# Patient Record
Sex: FEMALE | Race: WHITE | NOT HISPANIC OR LATINO | Employment: UNEMPLOYED | URBAN - METROPOLITAN AREA
[De-identification: names, ages, dates, MRNs, and addresses within clinical notes are randomized per-mention and may not be internally consistent; named-entity substitution may affect disease eponyms.]

---

## 2017-09-05 ENCOUNTER — TELEPHONE (OUTPATIENT)
Dept: NEUROLOGY | Facility: CLINIC | Age: 27
End: 2017-09-05

## 2017-09-05 DIAGNOSIS — G56.00 CTS (CARPAL TUNNEL SYNDROME): Primary | ICD-10-CM

## 2017-09-05 NOTE — TELEPHONE ENCOUNTER
----- Message from Mac Madrid sent at 9/5/2017 10:19 AM CDT -----  Contact: Mr. Sloan (dad) @ [phone]  Mr. Sloan is trying to schedule an EMG for pt, but the order is not in the system. Mr. Sloan states he faxed the orders to [phone] or [phone] or [phone]. Pls call.          I called this patient to schedule a sooner EMG and the father commenced to yelling at me stating that you people at your office almost killed my daughter and I explained to him more than once that we had never seen his daughter and I was only attempting to schedule his daughters EMG and had no knowledge of what he was speaking about he accepted the appointment offered for 9/27 at 10 am

## 2017-09-27 ENCOUNTER — PROCEDURE VISIT (OUTPATIENT)
Dept: NEUROLOGY | Facility: CLINIC | Age: 27
End: 2017-09-27
Payer: MEDICAID

## 2017-09-27 DIAGNOSIS — G56.00 CTS (CARPAL TUNNEL SYNDROME): ICD-10-CM

## 2017-09-27 PROCEDURE — 95886 MUSC TEST DONE W/N TEST COMP: CPT | Mod: 26,S$PBB,, | Performed by: NEUROMUSCULOSKELETAL MEDICINE & OMM

## 2017-09-27 PROCEDURE — 95910 NRV CNDJ TEST 7-8 STUDIES: CPT | Mod: 26,S$PBB,, | Performed by: NEUROMUSCULOSKELETAL MEDICINE & OMM

## 2017-09-27 PROCEDURE — 95886 MUSC TEST DONE W/N TEST COMP: CPT | Mod: PBBFAC,PO | Performed by: NEUROMUSCULOSKELETAL MEDICINE & OMM

## 2017-09-27 PROCEDURE — 95910 NRV CNDJ TEST 7-8 STUDIES: CPT | Mod: PBBFAC,PO | Performed by: NEUROMUSCULOSKELETAL MEDICINE & OMM

## 2017-12-20 ENCOUNTER — TELEPHONE (OUTPATIENT)
Dept: NEUROSURGERY | Facility: CLINIC | Age: 27
End: 2017-12-20

## 2017-12-20 DIAGNOSIS — G40.219 PARTIAL SYMPTOMATIC EPILEPSY WITH COMPLEX PARTIAL SEIZURES, INTRACTABLE, WITHOUT STATUS EPILEPTICUS: Primary | ICD-10-CM

## 2017-12-28 ENCOUNTER — ANESTHESIA EVENT (OUTPATIENT)
Dept: SURGERY | Facility: HOSPITAL | Age: 27
End: 2017-12-28
Payer: MEDICAID

## 2017-12-28 RX ORDER — ZONISAMIDE 100 MG/1
100 CAPSULE ORAL EVERY 12 HOURS
Status: ON HOLD | COMMUNITY
End: 2023-03-31 | Stop reason: ALTCHOICE

## 2017-12-28 RX ORDER — NAPROXEN 250 MG/1
250 TABLET ORAL
Status: ON HOLD | COMMUNITY
End: 2023-04-21 | Stop reason: HOSPADM

## 2017-12-28 RX ORDER — FELBAMATE 600 MG/1
600 TABLET ORAL EVERY 12 HOURS
Status: ON HOLD | COMMUNITY
End: 2023-03-31 | Stop reason: ALTCHOICE

## 2017-12-28 RX ORDER — GLUCOSAMINE/CHONDRO SU A 500-400 MG
1 TABLET ORAL
COMMUNITY
End: 2017-12-28

## 2017-12-29 ENCOUNTER — ANESTHESIA (OUTPATIENT)
Dept: SURGERY | Facility: HOSPITAL | Age: 27
End: 2017-12-29
Payer: MEDICAID

## 2017-12-29 ENCOUNTER — HOSPITAL ENCOUNTER (OUTPATIENT)
Facility: HOSPITAL | Age: 27
LOS: 1 days | Discharge: HOME | End: 2017-12-29
Attending: NEUROLOGICAL SURGERY | Admitting: NEUROLOGICAL SURGERY
Payer: MEDICAID

## 2017-12-29 VITALS
TEMPERATURE: 98 F | RESPIRATION RATE: 14 BRPM | HEIGHT: 65 IN | BODY MASS INDEX: 20.99 KG/M2 | HEART RATE: 66 BPM | DIASTOLIC BLOOD PRESSURE: 73 MMHG | OXYGEN SATURATION: 98 % | WEIGHT: 126 LBS | SYSTOLIC BLOOD PRESSURE: 118 MMHG

## 2017-12-29 DIAGNOSIS — G40.209 EPILEPSY WITH PARTIAL COMPLEX SEIZURES, WITHOUT STATUS EPILEPTICUS: ICD-10-CM

## 2017-12-29 DIAGNOSIS — G40.219 PARTIAL SYMPTOMATIC EPILEPSY WITH COMPLEX PARTIAL SEIZURES, INTRACTABLE, WITHOUT STATUS EPILEPTICUS: Primary | ICD-10-CM

## 2017-12-29 LAB
ABO + RH BLD: NORMAL
ANION GAP SERPL CALC-SCNC: 9 MMOL/L
APTT BLDCRRT: 25.4 SEC
BASOPHILS # BLD AUTO: 0.06 K/UL
BASOPHILS NFR BLD: 1.2 %
BILIRUB UR QL STRIP: NEGATIVE
BLD GP AB SCN CELLS X3 SERPL QL: NORMAL
BUN SERPL-MCNC: 17 MG/DL
CALCIUM SERPL-MCNC: 10 MG/DL
CHLORIDE SERPL-SCNC: 106 MMOL/L
CLARITY UR REFRACT.AUTO: CLEAR
CO2 SERPL-SCNC: 24 MMOL/L
COLOR UR AUTO: YELLOW
CREAT SERPL-MCNC: 0.7 MG/DL
DIFFERENTIAL METHOD: ABNORMAL
EOSINOPHIL # BLD AUTO: 0.2 K/UL
EOSINOPHIL NFR BLD: 3.1 %
ERYTHROCYTE [DISTWIDTH] IN BLOOD BY AUTOMATED COUNT: 16.5 %
EST. GFR  (AFRICAN AMERICAN): >60 ML/MIN/1.73 M^2
EST. GFR  (NON AFRICAN AMERICAN): >60 ML/MIN/1.73 M^2
GLUCOSE SERPL-MCNC: 71 MG/DL
GLUCOSE UR QL STRIP: NEGATIVE
HCT VFR BLD AUTO: 34.7 %
HGB BLD-MCNC: 11.2 G/DL
HGB UR QL STRIP: NEGATIVE
IMM GRANULOCYTES # BLD AUTO: 0.02 K/UL
IMM GRANULOCYTES NFR BLD AUTO: 0.4 %
INR PPP: 1
KETONES UR QL STRIP: NEGATIVE
LEUKOCYTE ESTERASE UR QL STRIP: NEGATIVE
LYMPHOCYTES # BLD AUTO: 2 K/UL
LYMPHOCYTES NFR BLD: 39.5 %
MCH RBC QN AUTO: 26.9 PG
MCHC RBC AUTO-ENTMCNC: 32.3 G/DL
MCV RBC AUTO: 83 FL
MONOCYTES # BLD AUTO: 0.4 K/UL
MONOCYTES NFR BLD: 6.8 %
NEUTROPHILS # BLD AUTO: 2.5 K/UL
NEUTROPHILS NFR BLD: 49 %
NITRITE UR QL STRIP: NEGATIVE
NRBC BLD-RTO: 0 /100 WBC
PH UR STRIP: 6 [PH] (ref 5–8)
PLATELET # BLD AUTO: 355 K/UL
PMV BLD AUTO: 10.3 FL
POTASSIUM SERPL-SCNC: 3.7 MMOL/L
PROT UR QL STRIP: NEGATIVE
PROTHROMBIN TIME: 10.3 SEC
RBC # BLD AUTO: 4.16 M/UL
SODIUM SERPL-SCNC: 139 MMOL/L
SP GR UR STRIP: 1.01 (ref 1–1.03)
URN SPEC COLLECT METH UR: NORMAL
UROBILINOGEN UR STRIP-ACNC: NEGATIVE EU/DL
WBC # BLD AUTO: 5.11 K/UL

## 2017-12-29 PROCEDURE — 80048 BASIC METABOLIC PNL TOTAL CA: CPT

## 2017-12-29 PROCEDURE — 25000003 PHARM REV CODE 250: Performed by: ANESTHESIOLOGY

## 2017-12-29 PROCEDURE — 85610 PROTHROMBIN TIME: CPT

## 2017-12-29 PROCEDURE — 63600175 PHARM REV CODE 636 W HCPCS: Performed by: STUDENT IN AN ORGANIZED HEALTH CARE EDUCATION/TRAINING PROGRAM

## 2017-12-29 PROCEDURE — 37000009 HC ANESTHESIA EA ADD 15 MINS: Performed by: NEUROLOGICAL SURGERY

## 2017-12-29 PROCEDURE — 99499 UNLISTED E&M SERVICE: CPT | Mod: ,,, | Performed by: NEUROLOGICAL SURGERY

## 2017-12-29 PROCEDURE — 85730 THROMBOPLASTIN TIME PARTIAL: CPT

## 2017-12-29 PROCEDURE — 36000709 HC OR TIME LEV III EA ADD 15 MIN: Performed by: NEUROLOGICAL SURGERY

## 2017-12-29 PROCEDURE — 94761 N-INVAS EAR/PLS OXIMETRY MLT: CPT | Mod: 59

## 2017-12-29 PROCEDURE — 63600175 PHARM REV CODE 636 W HCPCS

## 2017-12-29 PROCEDURE — D9220A PRA ANESTHESIA: Mod: ,,, | Performed by: ANESTHESIOLOGY

## 2017-12-29 PROCEDURE — 37000008 HC ANESTHESIA 1ST 15 MINUTES: Performed by: NEUROLOGICAL SURGERY

## 2017-12-29 PROCEDURE — 63600175 PHARM REV CODE 636 W HCPCS: Performed by: ANESTHESIOLOGY

## 2017-12-29 PROCEDURE — 85025 COMPLETE CBC W/AUTO DIFF WBC: CPT

## 2017-12-29 PROCEDURE — 71000015 HC POSTOP RECOV 1ST HR: Performed by: NEUROLOGICAL SURGERY

## 2017-12-29 PROCEDURE — 81003 URINALYSIS AUTO W/O SCOPE: CPT

## 2017-12-29 PROCEDURE — 25000003 PHARM REV CODE 250: Performed by: NEUROLOGICAL SURGERY

## 2017-12-29 PROCEDURE — 71000033 HC RECOVERY, INTIAL HOUR: Performed by: NEUROLOGICAL SURGERY

## 2017-12-29 PROCEDURE — 86901 BLOOD TYPING SEROLOGIC RH(D): CPT

## 2017-12-29 PROCEDURE — C1776 JOINT DEVICE (IMPLANTABLE): HCPCS | Performed by: NEUROLOGICAL SURGERY

## 2017-12-29 PROCEDURE — 71000039 HC RECOVERY, EACH ADD'L HOUR: Performed by: NEUROLOGICAL SURGERY

## 2017-12-29 PROCEDURE — C1767 GENERATOR, NEURO NON-RECHARG: HCPCS | Performed by: NEUROLOGICAL SURGERY

## 2017-12-29 PROCEDURE — 36000708 HC OR TIME LEV III 1ST 15 MIN: Performed by: NEUROLOGICAL SURGERY

## 2017-12-29 PROCEDURE — 27201423 OPTIME MED/SURG SUP & DEVICES STERILE SUPPLY: Performed by: NEUROLOGICAL SURGERY

## 2017-12-29 PROCEDURE — 71000016 HC POSTOP RECOV ADDL HR: Performed by: NEUROLOGICAL SURGERY

## 2017-12-29 PROCEDURE — 25000003 PHARM REV CODE 250: Performed by: STUDENT IN AN ORGANIZED HEALTH CARE EDUCATION/TRAINING PROGRAM

## 2017-12-29 PROCEDURE — 63600175 PHARM REV CODE 636 W HCPCS: Performed by: NEUROLOGICAL SURGERY

## 2017-12-29 PROCEDURE — 27000221 HC OXYGEN, UP TO 24 HOURS

## 2017-12-29 PROCEDURE — 94760 N-INVAS EAR/PLS OXIMETRY 1: CPT

## 2017-12-29 DEVICE — IMPLANTABLE DEVICE: Type: IMPLANTABLE DEVICE | Site: NECK | Status: FUNCTIONAL

## 2017-12-29 RX ORDER — ACETAMINOPHEN 10 MG/ML
INJECTION, SOLUTION INTRAVENOUS
Status: DISCONTINUED | OUTPATIENT
Start: 2017-12-29 | End: 2017-12-29

## 2017-12-29 RX ORDER — MUPIROCIN 20 MG/G
OINTMENT TOPICAL
Status: DISCONTINUED | OUTPATIENT
Start: 2017-12-29 | End: 2017-12-29 | Stop reason: HOSPADM

## 2017-12-29 RX ORDER — HYDROCODONE BITARTRATE AND ACETAMINOPHEN 10; 325 MG/1; MG/1
1 TABLET ORAL EVERY 6 HOURS PRN
Qty: 15 TABLET | Refills: 0 | Status: ON HOLD | OUTPATIENT
Start: 2017-12-29 | End: 2023-03-31 | Stop reason: SDUPTHER

## 2017-12-29 RX ORDER — OXYCODONE AND ACETAMINOPHEN 10; 325 MG/1; MG/1
1 TABLET ORAL EVERY 4 HOURS PRN
Status: DISCONTINUED | OUTPATIENT
Start: 2017-12-29 | End: 2017-12-29 | Stop reason: HOSPADM

## 2017-12-29 RX ORDER — MUPIROCIN 20 MG/G
1 OINTMENT TOPICAL
Status: COMPLETED | OUTPATIENT
Start: 2017-12-29 | End: 2017-12-29

## 2017-12-29 RX ORDER — ONDANSETRON 8 MG/1
8 TABLET, ORALLY DISINTEGRATING ORAL EVERY 6 HOURS PRN
Status: DISCONTINUED | OUTPATIENT
Start: 2017-12-29 | End: 2017-12-29 | Stop reason: HOSPADM

## 2017-12-29 RX ORDER — VANCOMYCIN HYDROCHLORIDE 1 G/20ML
INJECTION, POWDER, LYOPHILIZED, FOR SOLUTION INTRAVENOUS
Status: DISCONTINUED | OUTPATIENT
Start: 2017-12-29 | End: 2017-12-29 | Stop reason: HOSPADM

## 2017-12-29 RX ORDER — LIDOCAINE HCL/PF 100 MG/5ML
SYRINGE (ML) INTRAVENOUS
Status: DISCONTINUED | OUTPATIENT
Start: 2017-12-29 | End: 2017-12-29

## 2017-12-29 RX ORDER — BACITRACIN 50000 [IU]/1
INJECTION, POWDER, FOR SOLUTION INTRAMUSCULAR
Status: DISCONTINUED | OUTPATIENT
Start: 2017-12-29 | End: 2017-12-29 | Stop reason: HOSPADM

## 2017-12-29 RX ORDER — HYDROMORPHONE HYDROCHLORIDE 2 MG/ML
0.2 INJECTION, SOLUTION INTRAMUSCULAR; INTRAVENOUS; SUBCUTANEOUS EVERY 5 MIN PRN
Status: DISCONTINUED | OUTPATIENT
Start: 2017-12-29 | End: 2017-12-29 | Stop reason: HOSPADM

## 2017-12-29 RX ORDER — SODIUM CHLORIDE 9 MG/ML
INJECTION, SOLUTION INTRAVENOUS CONTINUOUS
Status: DISCONTINUED | OUTPATIENT
Start: 2017-12-29 | End: 2017-12-29 | Stop reason: HOSPADM

## 2017-12-29 RX ORDER — ROCURONIUM BROMIDE 10 MG/ML
INJECTION, SOLUTION INTRAVENOUS
Status: DISCONTINUED | OUTPATIENT
Start: 2017-12-29 | End: 2017-12-29

## 2017-12-29 RX ORDER — HYDROMORPHONE HYDROCHLORIDE 2 MG/ML
INJECTION, SOLUTION INTRAMUSCULAR; INTRAVENOUS; SUBCUTANEOUS
Status: COMPLETED
Start: 2017-12-29 | End: 2017-12-29

## 2017-12-29 RX ORDER — MIDAZOLAM HYDROCHLORIDE 1 MG/ML
INJECTION, SOLUTION INTRAMUSCULAR; INTRAVENOUS
Status: DISCONTINUED | OUTPATIENT
Start: 2017-12-29 | End: 2017-12-29

## 2017-12-29 RX ORDER — BUPIVACAINE HYDROCHLORIDE AND EPINEPHRINE 5; 5 MG/ML; UG/ML
INJECTION, SOLUTION EPIDURAL; INTRACAUDAL; PERINEURAL
Status: DISCONTINUED | OUTPATIENT
Start: 2017-12-29 | End: 2017-12-29 | Stop reason: HOSPADM

## 2017-12-29 RX ORDER — SUCCINYLCHOLINE CHLORIDE 20 MG/ML
INJECTION INTRAMUSCULAR; INTRAVENOUS
Status: DISCONTINUED | OUTPATIENT
Start: 2017-12-29 | End: 2017-12-29

## 2017-12-29 RX ORDER — SODIUM CHLORIDE 0.9 % (FLUSH) 0.9 %
3 SYRINGE (ML) INJECTION
Status: DISCONTINUED | OUTPATIENT
Start: 2017-12-29 | End: 2017-12-29 | Stop reason: HOSPADM

## 2017-12-29 RX ORDER — ONDANSETRON 2 MG/ML
INJECTION INTRAMUSCULAR; INTRAVENOUS
Status: DISCONTINUED | OUTPATIENT
Start: 2017-12-29 | End: 2017-12-29

## 2017-12-29 RX ORDER — ONDANSETRON 2 MG/ML
4 INJECTION INTRAMUSCULAR; INTRAVENOUS ONCE AS NEEDED
Status: DISCONTINUED | OUTPATIENT
Start: 2017-12-29 | End: 2017-12-29 | Stop reason: HOSPADM

## 2017-12-29 RX ORDER — ACETAMINOPHEN 325 MG/1
325 TABLET ORAL EVERY 6 HOURS PRN
Status: DISCONTINUED | OUTPATIENT
Start: 2017-12-29 | End: 2017-12-29 | Stop reason: HOSPADM

## 2017-12-29 RX ORDER — CEPHALEXIN 500 MG/1
500 CAPSULE ORAL EVERY 6 HOURS
Qty: 12 CAPSULE | Refills: 0 | Status: SHIPPED | OUTPATIENT
Start: 2017-12-29 | End: 2018-01-01

## 2017-12-29 RX ORDER — PROPOFOL 10 MG/ML
VIAL (ML) INTRAVENOUS
Status: DISCONTINUED | OUTPATIENT
Start: 2017-12-29 | End: 2017-12-29

## 2017-12-29 RX ORDER — PHENYLEPHRINE HYDROCHLORIDE 10 MG/ML
INJECTION INTRAVENOUS
Status: DISCONTINUED | OUTPATIENT
Start: 2017-12-29 | End: 2017-12-29

## 2017-12-29 RX ORDER — MUPIROCIN 20 MG/G
1 OINTMENT TOPICAL 2 TIMES DAILY
Status: DISCONTINUED | OUTPATIENT
Start: 2017-12-29 | End: 2017-12-29 | Stop reason: HOSPADM

## 2017-12-29 RX ORDER — FENTANYL CITRATE 50 UG/ML
INJECTION, SOLUTION INTRAMUSCULAR; INTRAVENOUS
Status: DISCONTINUED | OUTPATIENT
Start: 2017-12-29 | End: 2017-12-29

## 2017-12-29 RX ADMIN — ROCURONIUM BROMIDE 10 MG: 10 INJECTION, SOLUTION INTRAVENOUS at 10:12

## 2017-12-29 RX ADMIN — FENTANYL CITRATE 100 MCG: 50 INJECTION, SOLUTION INTRAMUSCULAR; INTRAVENOUS at 09:12

## 2017-12-29 RX ADMIN — EPHEDRINE SULFATE 10 MG: 50 INJECTION, SOLUTION INTRAMUSCULAR; INTRAVENOUS; SUBCUTANEOUS at 10:12

## 2017-12-29 RX ADMIN — HYDROMORPHONE HYDROCHLORIDE 0.2 MG: 2 INJECTION INTRAMUSCULAR; INTRAVENOUS; SUBCUTANEOUS at 11:12

## 2017-12-29 RX ADMIN — ROCURONIUM BROMIDE 5 MG: 10 INJECTION, SOLUTION INTRAVENOUS at 09:12

## 2017-12-29 RX ADMIN — PROPOFOL 150 MG: 10 INJECTION, EMULSION INTRAVENOUS at 09:12

## 2017-12-29 RX ADMIN — MUPIROCIN 1 G: 20 OINTMENT TOPICAL at 08:12

## 2017-12-29 RX ADMIN — LIDOCAINE HYDROCHLORIDE 80 MG: 20 INJECTION, SOLUTION INTRAVENOUS at 09:12

## 2017-12-29 RX ADMIN — ACETAMINOPHEN 1000 MG: 10 INJECTION, SOLUTION INTRAVENOUS at 10:12

## 2017-12-29 RX ADMIN — PROPOFOL 50 MG: 10 INJECTION, EMULSION INTRAVENOUS at 10:12

## 2017-12-29 RX ADMIN — SODIUM CHLORIDE: 0.9 INJECTION, SOLUTION INTRAVENOUS at 09:12

## 2017-12-29 RX ADMIN — ROCURONIUM BROMIDE 25 MG: 10 INJECTION, SOLUTION INTRAVENOUS at 10:12

## 2017-12-29 RX ADMIN — OXYCODONE HYDROCHLORIDE AND ACETAMINOPHEN 1 TABLET: 10; 325 TABLET ORAL at 12:12

## 2017-12-29 RX ADMIN — CEFTRIAXONE 2 G: 2 INJECTION, SOLUTION INTRAVENOUS at 09:12

## 2017-12-29 RX ADMIN — ONDANSETRON 4 MG: 2 INJECTION INTRAMUSCULAR; INTRAVENOUS at 11:12

## 2017-12-29 RX ADMIN — SODIUM CHLORIDE, SODIUM GLUCONATE, SODIUM ACETATE, POTASSIUM CHLORIDE, MAGNESIUM CHLORIDE, SODIUM PHOSPHATE, DIBASIC, AND POTASSIUM PHOSPHATE: .53; .5; .37; .037; .03; .012; .00082 INJECTION, SOLUTION INTRAVENOUS at 10:12

## 2017-12-29 RX ADMIN — PHENYLEPHRINE HYDROCHLORIDE 200 MCG: 10 INJECTION INTRAVENOUS at 09:12

## 2017-12-29 RX ADMIN — HYDROMORPHONE HYDROCHLORIDE 0.2 MG: 2 INJECTION INTRAMUSCULAR; INTRAVENOUS; SUBCUTANEOUS at 12:12

## 2017-12-29 RX ADMIN — MIDAZOLAM HYDROCHLORIDE 2 MG: 1 INJECTION, SOLUTION INTRAMUSCULAR; INTRAVENOUS at 09:12

## 2017-12-29 RX ADMIN — SUCCINYLCHOLINE CHLORIDE 140 MG: 20 INJECTION, SOLUTION INTRAMUSCULAR; INTRAVENOUS at 09:12

## 2017-12-29 NOTE — PLAN OF CARE
Discharge instructions reviewed with pt and family. Understanding verbalized. Paper prescriptions given. Pt reported pain to be tolerable. MD to bedside to address concerns and follow up care. Pt able to urinate in restroom and tolerate PO intake. Transported to car by PCT.

## 2017-12-29 NOTE — ANESTHESIA PREPROCEDURE EVALUATION
No past medical history on file.  No past surgical history on file.  Patient Active Problem List   Diagnosis    Epilepsy with partial complex seizures, without status epilepticus     Please See ROS/PMH and Active Problem List above                                                                                                           12/29/2017  Melani Sloan is a 27 y.o., female.    Anesthesia Evaluation    I have reviewed the Patient Summary Reports.    I have reviewed the Nursing Notes.   I have reviewed the Medications.     Review of Systems  Anesthesia Hx:  No problems with previous Anesthesia   Denies Personal Hx of Anesthesia complications.   Social:  Non-Smoker    Hematology/Oncology:  Hematology Normal   Oncology Normal     EENT/Dental:EENT/Dental Normal   Cardiovascular:   Exercise tolerance: good Works out daily, active   Pulmonary:  Pulmonary Normal    Renal/:  Renal/ Normal     Musculoskeletal:  Musculoskeletal Normal    Neurological:   Seizures S/p left VNS years ago    Prone to median nerve sx after seizures secondary to hand position.   Endocrine:  Endocrine Normal    Dermatological:  Skin Normal        Physical Exam  General:  Well nourished    Airway/Jaw/Neck:  Airway Findings: Mouth Opening: Normal Tongue: Normal  General Airway Assessment: Adult  Mallampati: I  TM Distance: 4 - 6 cm  Jaw/Neck Findings:  Neck ROM: Normal ROM     Eyes/Ears/Nose:  Eyes/Ears/Nose Findings:    Dental:  Dental Findings: In tact   Chest/Lungs:  Chest/Lungs Findings: Clear to auscultation, Normal Respiratory Rate     Heart/Vascular:  Heart Findings: Rate: Normal  Rhythm: Regular Rhythm        Mental Status:  Mental Status Findings:  Cooperative, Alert and Oriented         Anesthesia Plan  Type of Anesthesia, risks & benefits discussed:  Anesthesia Type:  general  Patient's Preference: gen  Intra-op Monitoring Plan:   Intra-op Monitoring Plan Comments:   Post Op Pain Control Plan:   Post Op Pain Control Plan  Comments: Iv>po  Induction:   IV  Beta Blocker:  Patient is not currently on a Beta-Blocker (No further documentation required).       Informed Consent: Patient understands risks and agrees with Anesthesia plan.  Questions answered. Anesthesia consent signed with patient.  ASA Score: 2     Day of Surgery Review of History & Physical:    H&P update referred to the surgeon.     Anesthesia Plan Notes: Labs ordered by service        Ready For Surgery From Anesthesia Perspective.

## 2017-12-29 NOTE — DISCHARGE INSTRUCTIONS
Activity Restrictions:  [x]  Return to work will be determined on an individual basis.  [x]  No lifting greater than 10 pounds.  [x]  No driving or operating machinery:  [x]  until cleared by your surgeon.  [x]  while taking narcotic pain medications or muscle relaxants.      Discharge Medication/Follow-up:  [x]  Please refer to discharge medication reconciliation form.  [x]  Take docusate (Colace 100 mg): take one capsule a day as needed for constipation. You can get this over the counter.  [x]  Follow-up appointment:  [x]  10-14 days post-op for wound check by physician assistant/nurse  []  An appointment will be mailed to you, or you will be called by [facility] outpatient neurosurgery clinic.    Wound Care:  [x]  No bandage required. Keep your incision open to the air. Please avoid ointments on wound.  [x]  You may shower on the 2nd day after your surgery. Have the force of water hit you opposite from the incision. Pat the incision dry after your shower; do not scrub the incision.  [x]  You cannot take a bath until 8 weeks after surgery.    Call your doctor or go to the Emergency Room for any signs of infection, including: increased redness, drainage, pain, or fever (temperature ?101.5 for 24 hours). Call your doctor or go to the Emergency Room if there are any localized neurological changes; problems with speech, vision, numbness, tingling, weakness, or severe headache; or for other concerns.    Special Instructions:  [x]  No use of tobacco products.  [x]  Diet: Please eat a regular diet as tolerated.    Physicians need 3 days' notice for pain medicine to be refilled. Pain medicine will only be refilled between 8 AM and 5 PM, Monday through Friday, due to Food and Drug Administration regulation of documentation.    If you have any questions about this form, please call [phone].    Form No. 56015 (Revised 10/31/2013)            Recovery After Procedural Sedation (Adult)  You have been given medicine  by vein to make you sleep during your surgery. This may have included both a pain medicine and sleeping medicine. Most of the effects have worn off. But you may still have some drowsiness for the next 6 to 8 hours.  Home care  Follow these guidelines when you get home:  · For the next 8 hours, you should be watched by a responsible adult. This person should make sure your condition is not getting worse.  · Don't drink any alcohol for the next 24 hours.  · Don't drive, operate dangerous machinery, or make important business or personal decisions during the next 24 hours.  Note: Your healthcare provider may tell you not to take any medicine by mouth for pain or sleep in the next 4 hours. These medicines may react with the medicines you were given in the hospital. This could cause a much stronger response than usual.  Follow-up care  Follow up with your healthcare provider if you are not alert and back to your usual level of activity within 12 hours.  When to seek medical advice  Call your healthcare provider right away if any of these occur:  · Drowsiness gets worse  · Weakness or dizziness gets worse  · Repeated vomiting  · You can't be awakened   Date Last Reviewed: 10/18/2016  © 7824-2015 [employer]. [address] [place] [address], [place] [address]. All rights reserved. This information is not intended as a substitute for professional medical care. Always follow your healthcare professional's instructions.

## 2017-12-29 NOTE — H&P
History and Physical  Neurosurgery    Admit Date: 12/29/2017  LOS: 0    Code Status: No Order     CC: <principal problem not specified>    SUBJECTIVE:     History of Present Illness: 26 yo female with pmh of complex partial epilepsy s/p L VNS implanattion presents for elective total system replacement in favor of a newer model per family. Patient is neurologically intact on exam. Current symptomology controlled with triple antiepileptic regimen which has been effective per family.    Left cervical and thoracic incisions well healed.         OBJECTIVE:   Vital Signs (Most Recent):   Temp: 97.7 °F (36.5 °C) (12/29/17 0915)  Pulse: 71 (12/29/17 0915)  Resp: 18 (12/29/17 0915)  BP: 110/66 (12/29/17 0916)  SpO2: 100 % (12/29/17 0915)    Vital Signs (24h Range):   Temp:  [97.7 °F (36.5 °C)] 97.7 °F (36.5 °C)  Pulse:  [71] 71  Resp:  [18] 18  SpO2:  [100 %] 100 %  BP: (110)/(66) 110/66      I & O (Last 24h):  No intake or output data in the 24 hours ending 12/29/17 0918    Physical Exam:  General: well developed, well nourished, no distress.   Head: normocephalic, atraumatic  Cervical Spine: No midline tenderness to palpation.  Thoracolumbosacral Spine: No midline tenderness to palpation.  GCS: Motor: 6/Verbal: 5/Eyes: 4 GCS Total: 15  Mental Status: Awake, Alert, Oriented x 4  Language: No aphasia  Speech: Mild dysarthria  Facial Droop: None   Cranial nerves: CN III-XII grossly intact.  Visual Fields: Intact.   Eyes: Pupils equal and reactive to light. Intact Conjugate horizontal and vertical pursuit. No nystagmus. No gaze deviation.   Pulmonary: No distress.  Sensory: No deficit.  Propioception: Not tested  Rectal Tone: Not tested.  Drift: None.  Upper Extremity Ataxia: No Dysmetria Bilaterally  Lower Extremity Ataxia: Not tested.  Dysdiadochokinesia: Not tested.  Reflexes: 2+ patellar bilaterally.  Parrish: Absent  Clonus: Absent  Babinski: Absent  Romberg: Not Tested  Pulses: Brisk and symmetric radial, DP and tibial  pulses.  Motor Strength:    Strength  Shoulder Abduction Elbow Extension Elbow Flexion Wrist Extension Wrist Flexion Finger Opposition Finger Add Finger Abd   Upper: R 5/5 5/5 5/5 5/5 5/5 5/5 5/5 5/5    L 5/5 5/5 5/5 5/5 5/5 5/5 5/5 5/5     Hip Flexion Knee Extension Knee  Flexion Ankle Dflexion Ankle Pflexion EHL     Lower: R 5/5 5/5 5/5 5/5 5/5 5/5      L 5/5 5/5 5/5 5/5 5/5 5/5           Lines/Drains/Airway:          Nutrition/Tube Feeds:   Current Diet Order   Procedures    Diet NPO       Labs:  ABG: No results for input(s): PH, PO2, PCO2, HCO3, POCSATURATED, BE in the last 24 hours.  BMP:No results for input(s): NA, K, CL, CO2, BUN, CREATININE, GLU, MG, PHOS in the last 24 hours.  LFT: No results found for: AST, ALT, GGT, ALKPHOS, BILITOT, ALBUMIN, PROT  CBC: No results found for: WBC, HGB, HCT, MCV, PLT  Microbiology x 7d:   Microbiology Results (last 7 days)     ** No results found for the last 168 hours. **            ASSESSMENT/PLAN:   28 yo female with pmh of complex partial epilepsy presenting for elective total system replacement of previously implanted left VNS. Neurologically stable on exam.    ---To OR for elective VNS total revision      Tucker Avery

## 2017-12-29 NOTE — DISCHARGE SUMMARY
[facility]  Neurosurgery  Discharge Summary      Patient Name: Melani Sloan  MRN: [id number]  Admission Date: 12/29/2017  Hospital Length of Stay: 1 days  Discharge Date and Time:  12/29/2017 12:46 PM  Attending Physician: Darrick Leal MD   Discharging Provider: Tucker Avery MD  Primary Care Provider: Portia Cohen MD     HPI: 26 yo female with pmh of complex partial epilepsy s/p L VNS placement with axillary subcutaneous IPG at end of life (the VNS) presents for elective total component replacement. She tolerated the procedure well and will be discharged home today with a short course of oral antibiotics and analgesics with instructions to follow up in outpatient clinic in 14 days for wound check. She has been instructed to continue her home anti-epileptic regimen. Patient has remained neurologically intact on exam throughout stay.       Procedure(s) (LRB):  PLACEMENT-STIMULATOR-NERVE-VAGAL total revision- Left neck and chest (N/A)     Hospital Course: As above.     Consults:     Significant Diagnostic Studies: Labs:   BMP:   Recent Labs  Lab 12/29/17  0845   GLU 71      K 3.7      CO2 24   BUN 17   CREATININE 0.7   CALCIUM 10.0   , CMP   Recent Labs  Lab 12/29/17  0845      K 3.7      CO2 24   GLU 71   BUN 17   CREATININE 0.7   CALCIUM 10.0   ANIONGAP 9   ESTGFRAFRICA >60.0   EGFRNONAA >60.0   , CBC   Recent Labs  Lab 12/29/17  0845   WBC 5.11   HGB 11.2*   HCT 34.7*   *    and All labs within the past 24 hours have been reviewed    Pending Diagnostic Studies:     None        Final Active Diagnoses:    Diagnosis Date Noted POA    PRINCIPAL PROBLEM:  Epilepsy with partial complex seizures, without status epilepticus [G40.209] 12/29/2017 Yes      Problems Resolved During this Admission:    Diagnosis Date Noted Date Resolved POA      Discharged Condition: good    Disposition: Home or Self Care    Follow Up:  Follow-up Information     [facility] In 2 weeks.    Contact information:  You will be contacted to arrange follow up at your convenience within 14 days or so.           Please follow up.    Why:  For wound re-check               Patient Instructions:     Diet general     Call MD for:  temperature >100.4     Call MD for:  persistent nausea and vomiting     Call MD for:  extreme fatigue     Call MD for:  persistent dizziness or light-headedness     Call MD for:  hives     Call MD for:  difficulty breathing, headache or visual disturbances     Call MD for:  severe uncontrolled pain     Call MD for:  redness, tenderness, or signs of infection (pain, swelling, redness, odor or green/yellow discharge around incision site)     No dressing needed   Order Comments: Incisions closed with absorbable sutures and cyanoacrylate adhesive. No dressing needed. Please keep open to air, no need to apply bandages and please do not apply ointments. You may shower on the second day following surgery. Let water run over wound , but do not scrub directly or submerge. No baths or swimming above waist for 8 weeks.       Medications:  Reconciled Home Medications:   Current Discharge Medication List      START taking these medications    Details   cephALEXin (KEFLEX) 500 MG capsule Take 1 capsule (500 mg total) by mouth every 6 (six) hours.  Qty: 12 capsule, Refills: 0      hydrocodone-acetaminophen 10-325mg (NORCO)  mg Tab Take 1 tablet by mouth every 6 (six) hours as needed for Pain (severe pain (7 or more out of 10).).  Qty: 15 tablet, Refills: 0         CONTINUE these medications which have NOT CHANGED    Details   !! brivaracetam (BRIVIACT) 50 mg Tab Take 50 mg by mouth every morning.      !! brivaracetam (BRIVIACT) 50 mg Tab Take 50 mg by mouth nightly.      felbamate (FELBATOL) 600 MG Tab Take 600 mg by mouth every 12 (twelve) hours.       naproxen (NAPROSYN) 250 MG tablet Take 250 mg by mouth every 4 to 6 hours as needed.      zonisamide (ZONEGRAN) 100 MG Cap  Take 100 mg by mouth every 12 (twelve) hours.       !! - Potential duplicate medications found. Please discuss with provider.      STOP taking these medications       UNABLE TO FIND Comments:   Reason for Stopping:               Tucker Avery MD  Neurosurgery  [facility]

## 2017-12-29 NOTE — BRIEF OP NOTE
[facility]  Brief Operative Note    SUMMARY     Surgery Date: 12/29/2017     Surgeon(s) and Role:     * Darrick Leal MD - Primary     * Tucker Avery MD - Resident - Assisting        Pre-op Diagnosis:  Partial symptomatic epilepsy with complex partial seizures, intractable, without status epilepticus [G40.219]    Post-op Diagnosis:  Post-Op Diagnosis Codes:     * Partial symptomatic epilepsy with complex partial seizures, intractable, without status epilepticus [G40.219]    Procedure(s) (LRB):  PLACEMENT-STIMULATOR-NERVE-VAGAL total revision- Left neck and chest (N/A)    Anesthesia: General    Description of Procedure: Total component removal and replacement of left vagal nerve stimulator with IPG in left subcutaneous axillary pocket.       Estimated Blood Loss: * No values recorded between 12/29/2017 10:11 AM and 12/29/2017 11:25 AM *         Specimens:   Specimen (12h ago through future)    None

## 2017-12-29 NOTE — OP NOTE
DATE OF PROCEDURE:  12/29/2017.    PREOPERATIVE DIAGNOSIS:  Medically refractory epilepsy with end-of-life   generator with old type 2 pin electrode.    POSTOPERATIVE DIAGNOSIS:  Medically refractory epilepsy with end-of-life   generator with old type 2 pin electrode.    PROCEDURE PERFORMED:  Total revision of vagus nerve stimulator with removal of   existing electrode and pulse generator and replacement with new electrode and   pulse generator.    SURGEON:  Davey Montejo M.D.    RESIDENT:  Tucker Avery M.D. (RES).    ANESTHESIA:  General, endotracheal tube.    ESTIMATED BLOOD LOSS:  Minimal.    COMPLICATIONS:  None.    SPECIMENS:  None.    BRIEF HISTORY:  Melani Sloan is a 27-year-old female with a history of   medically refractory epilepsy.  The vagus nerve stimulator has been doing a good   job of controlling her seizures; however, the battery is ending end of life.  I   spoke at length with the patient and her family regarding the risks, benefits,   and alternatives of proceeding with vagus nerve stimulator system revision.    Informed consent was obtained.    PROCEDURE IN DETAIL:  The present was taken to the Operating Room where she was   placed in the supine position on the operating table after an uneventful   induction of general anesthesia.  She was given 2 g of Ancef 20 minutes prior to   skin incision with an order to stop perioperative antibiotics within 24 hours.    The patient had SCD stockings in place for DVT prophylaxis.  She was prepped   and draped in the usual sterile fashion.  Her existing transverse cervical   incision was opened.  We undermined the platysma.  We identified the existing   electrode and dissected superiorly to expose normal carotid sheath.  We then   identified the vagus nerve and dissected inferiorly towards the existing   electrode.  We removed the top contact of the existing electrode and then   clipped the system, leaving two electrodes attached to the nerve.  We then    placed the 3 contacts to the new electrode around the nerve.  We then opened the   anterior axillary line incision.  We removed the existing generator and the   electrode.  Both wounds were copiously irrigated with normal saline.  We then   connected the new electrode after tunneling to the new generator.  This was   placed into the pocket and diagnostics were performed.  Everything was   satisfactory.    Both wounds were copiously irrigated with normal saline.  Vancomycin powder was   placed at both incisions.  The incisions were then closed with inverted   interrupted 3-0 Vicryl suture followed by running 4-0 Monocryl.    No apparent intraoperative complications occurred during this procedure.  I was   present for this entire procedure.  The patient was transferred to the Recovery   area in stable condition.      ASD/HN  dd: 12/29/2017 11:17:21 (CST)  td: 12/29/2017 11:44:41 (CST)  Doc ID   #[id number]  Job ID #[id number]    CC:

## 2017-12-29 NOTE — TRANSFER OF CARE
"Anesthesia Transfer of Care Note    Patient: Melani Sloan    Procedure(s) Performed: Procedure(s) (LRB):  PLACEMENT-STIMULATOR-NERVE-VAGAL total revision- Left neck and chest (N/A)    Patient location: PACU    Anesthesia Type: general    Transport from OR: Transported from OR on 2-3 L/min O2 by NC with adequate spontaneous ventilation    Post pain: adequate analgesia    Post assessment: no apparent anesthetic complications    Post vital signs: stable    Level of consciousness: awake, alert and oriented    Nausea/Vomiting: no nausea/vomiting    Complications: none    Transfer of care protocol was followed      Last vitals:   Visit Vitals  BP (!) 111/59   Pulse 75   Temp 36.5 °C (97.7 °F) (Oral)   Resp 16   Ht 5' 5" (1.651 m)   Wt 57.2 kg (126 lb)   LMP 12/01/2017 (Exact Date)   SpO2 100%   Breastfeeding? No   BMI 20.97 kg/m²     "

## 2017-12-29 NOTE — ANESTHESIA POSTPROCEDURE EVALUATION
"Anesthesia Post Evaluation    Patient: Melani Sloan    Procedure(s) Performed: Procedure(s) (LRB):  PLACEMENT-STIMULATOR-NERVE-VAGAL total revision- Left neck and chest (N/A)    Final Anesthesia Type: general  Patient location during evaluation: PACU  Patient participation: Yes- Able to Participate  Level of consciousness: awake and alert  Post-procedure vital signs: reviewed and stable  Pain management: adequate  Airway patency: patent  PONV status at discharge: No PONV  Anesthetic complications: no      Cardiovascular status: blood pressure returned to baseline  Respiratory status: unassisted  Hydration status: euvolemic  Follow-up not needed.        Visit Vitals  BP (!) 105/57   Pulse 71   Temp 36.7 °C (98 °F) (Oral)   Resp 16   Ht 5' 5" (1.651 m)   Wt 57.2 kg (126 lb)   LMP 12/01/2017 (Exact Date)   SpO2 100%   Breastfeeding? No   BMI 20.97 kg/m²       Pain/Dandre Score: Pain Assessment Performed: Yes (12/29/2017 11:45 AM)  Presence of Pain: complains of pain/discomfort (12/29/2017 11:45 AM)  Pain Rating Prior to Med Admin: 7 (12/29/2017 12:20 PM)  Dandre Score: 9 (12/29/2017 11:45 AM)      "

## 2018-01-02 ENCOUNTER — NURSE TRIAGE (OUTPATIENT)
Dept: ADMINISTRATIVE | Facility: CLINIC | Age: 28
End: 2018-01-02

## 2018-01-03 ENCOUNTER — TELEPHONE (OUTPATIENT)
Dept: NEUROSURGERY | Facility: CLINIC | Age: 28
End: 2018-01-03

## 2018-01-03 NOTE — TELEPHONE ENCOUNTER
Reason for Disposition   Caller has NON-URGENT question and triager unable to answer question    Answer Assessment - Initial Assessment Questions  Pt had revision of vagal nerve stimulator 12/29/17. C/o of pain when she moves her head. Level of pain depends on how she is moving/yawning. Pain can be rated a 7 or 8 when turning head to the right -feels pain on the left side of neck. Incision intact, no redness/drainage, no fever or other sx's reported.    Protocols used: ST POST-OP INCISION SYMPTOMS-A-AH

## 2018-01-03 NOTE — TELEPHONE ENCOUNTER
This is a Dr Montejo patient. They have already made a 1200 appt with Dr Montejo at [facility]

## 2018-01-03 NOTE — TELEPHONE ENCOUNTER
----- Message from Mary Lemus sent at 1/3/2018  8:42 AM CST -----  Contact: Baldemar  X 1st Request  _ 2nd Request  _ 3rd Request    Who: Baldemar pt father     Why: Baldemar states that the pt is experiencing pain and discomfort after surgery on  Dec 29th and is requesting to speak to nurse.     What Number to Call Back:[phone] Baldemar or [phone]     When to Expect a call back: (Before the end of the day)  -- if call after 3:00 call back will be tomorrow.

## 2018-10-01 DIAGNOSIS — G56.01 CARPAL TUNNEL SYNDROME ON RIGHT: Primary | ICD-10-CM

## 2018-10-08 ENCOUNTER — CLINICAL SUPPORT (OUTPATIENT)
Dept: REHABILITATION | Facility: HOSPITAL | Age: 28
End: 2018-10-08
Payer: MEDICAID

## 2018-10-08 DIAGNOSIS — R29.898 WEAKNESS OF RIGHT HAND: ICD-10-CM

## 2018-10-08 DIAGNOSIS — M25.531 PAIN IN RIGHT WRIST: ICD-10-CM

## 2018-10-08 PROCEDURE — 97165 OT EVAL LOW COMPLEX 30 MIN: CPT | Mod: PN

## 2018-10-08 NOTE — PATIENT INSTRUCTIONS
[facility] [place]  [phone]  JUSTINO BARRERA, OTEUGENIE, OTR/L, CHT  OCCUPATIONAL THERAPIST, CERTIFIED HAND THERAPIST      .

## 2018-10-08 NOTE — PLAN OF CARE
[facility] Occupational Therapy  Initial Evaluation     Name: Melani Sloan  Clinic Number: [id number]    Medical Diagnosis: Right Carpal tunnel s/p Release  Date of Surgery: 08/31/2018  Surgical Procedure: CTR  Therapy Diagnosis:   Encounter Diagnosis   Name Primary?    Pain in right wrist      Precautions: vagal nerve stimulator- Left neck/Left chest    Physician: Idalmis Waddell MD  Physician Orders: eval and treat- ROM/ strengthening  Date of Return to MD: Fredrickwjennifer    Evaluation Date: 10/8/2018  Plan of Care Certification Period: 10/08/2018-12/08/2018    Visit #:/ Visits authorized: 1/ TBD  Insurance Authorization period Expiration: TBD- submit for authorization    Time In:9AM  Time Out: 955AM  Total Billable Time: 55 minutes  Charges for this Visit: Low eval      Subjective     Involved Side:  right  Dominant Side: Right  Imaging: None in our EMR  Mechanism of Injury: NA  History of Current Condition: Pt. States she has been having symptoms for years. She reports she had the surgery and Dr. Waddell and at f/u Dr. Waddell recommended therapy.   Previous Therapy: Saw OT at [facility] before surgery  and was issued brace but it didn't help.     Pain:  Functional Pain Scale Rating 0-10:   6/10 at current  6/10 at best  7/10 at worst  Location: over incision  Description: Throbbing, Deep and Sharp  Aggravating Factors: daily use  Easing Factors: ice      Past Medical History/Physical Systems Review:   Melani Sloan  has a past medical history of Seizures.    Melani Sloan  has a past surgical history that includes Vagus nerve stimulator insertion and PLACEMENT-STIMULATOR-NERVE-VAGAL total revision- Left neck and chest (N/A, 12/29/2017).    Melani has a current medication list which includes the following prescription(s): brivaracetam, brivaracetam, felbamate, hydrocodone-acetaminophen, naproxen, and zonisamide.    Review of patient's allergies indicates:   Allergen Reactions    Benadryl  [diphenhydramine hcl]      Drug interactions          Patient's Goals for Therapy: See Assessment chart below.    Occupation:  See Assessment chart below.     Functional Limitations/Social History: See Assessment chart below.       Objective *= involved side     Observation/Appearance: well healed incision- dense scar tissue present    Edema. Measured in centimeters.   10/8/2018 10/8/2018    Left Right *   Wrist Crease 15.5 15.5   MCPs 18.0 18.5         Elbow and Wrist ROM. Measured in degrees.   10/8/2018 10/8/2018    Left Right *    AROM AROM   Wrist Ext/Flex 65/ 90 45/45   Wrist RD/UD 30/40 25/33     Hand ROM. Measured in degrees.   10/8/2018 10/8/2018    Left Right*    AROM AROM          Opposition DPC DPC but tight/ uncomfortable      Strength (Dynamometer) and Pinch Strength (Pinch Gauge)  Measured in pounds.   10/8/2018 10/8/2018    Left Right *   Rung II 45 23   Key Pinch 11 10   3pt Pinch 14 10   2pt Pinch 8 8     Sensation: distal volar fingertips, out of the blue if her hand is in dependent position too long      CMS Impairment/Limitation/Restriction for FOTO Survey  Therapist reviewed FOTO scores for Melani Sloan.  FOTO documents entered into [employer] - see Media section.    Intake Limitation Score: 49% - 10/8/2018       Treatment     Treatment Time In: 925AM  Treatment Time Out: 955AM  Total Treatment time separate from Evaluation time:20min    Melani received the following supervised modalities after being cleared for contradictions for 10 minutes:   -Patient received paraffin bath with MHP to right hand to increase blood flow, circulation, pain management and for tissue elasticity prior to therex.       Melani received the following direct contact modalities after being cleared for contraindications for 0 minutes:  -none today    Melani received the following manual therapy techniques for 3-5 minutes:   -Performed retrograde massage to decrease edema, improve joint mobility, decrease pain and  improve lymphatic drainage to increase hand function.   -Performed scar massage to CTR incision area to decrease adhesions and improve tensile glide.       Melani received therapeutic exercises for 8 minutes including:  -  AROM   Wrist flex/ext  Wrist RD/UD X 10 reps each   AROM - wave, hook, straight fist, composite fist, lifts, spreads, pinky slides X 10 reps each           Melani participated in dynamic functional therapeutic activities to improve functional performance for 0  minutes, including:  -none today    Home Exercise Program/Education:  Issued HEP (see patient instructions in EMR) and educated on modality use for pain management . Exercises were reviewed and Melani was able to demonstrate them prior to the end of the session.   Pt received a written copy of exercises to perform at home. Melani demonstrated good  understanding of the education provided.  Pt was advised to perform these exercises free of pain, and to stop performing them if pain occurs.    Patient/Family Education: role of OT, goals for OT, scheduling/cancellations - pt verbalized understanding. Discussed insurance limitations with patient.          Assessment     Melani Sloan is a 28 y.o. female referred to outpatient occupational/hand therapy and presents with a medical diagnosis of right carpal tunnel release, and demonstrates limitations as described in the chart below. Following evaluation and brief medical record review it is determined that pt will benefit from occupational therapy services in order to maximize pain free and/or functional use of right hand/UE. The following goals were discussed with the patient and patient is in agreement with them as to be addressed in the treatment plan. The patient's rehab potential is Good.     Anticipated barriers to occupational therapy: none  Pt has no cultural, educational or language barriers to learning provided.    Profile and History Assessment of Occupational Performance Level of  "Clinical Decision Making Complexity Score   Occupational Profile:   Melani Sloan is a 28 y.o. female who lives with their family and is currently not working now. She owns a dog grooming company.     Melani Sloan has difficulty with  dressing- pulling up pants, lifting items  shopping, phone/computer use and housework/household chores  Work occupations   affecting her daily functional abilities. Her main goal for therapy is " to get my hand better to how it normally should be so I can go back to work" .     Previous functional status: Independent with all ADLs.     Comorbidities:   See PMH and Physical Systems Review Section above.    Medical and Therapy History Review:   Brief               Performance Deficits    Physical:  Joint Mobility  Muscle Power/Strength  Muscle Endurance  Skin Integrity/Scar Formation  Edema   Strength  Pinch Strength  Gross Motor Coordination  Fine Motor Coordination  Pain    Cognitive:  No Deficits    Psychosocial:    No Deficits     Clinical Decision Making:  low    Assessment Process:  Problem-Focused Assessments    Modification/Need for Assistance:  Not Necessary    Intervention Selection:  Limited Treatment Options       low  Based on PMHX, co morbidities , data from assessments and functional level of assistance required with task and clinical presentation directly impacting function.         Goals   The following goals were discussed with the patient and patient is in agreement with them as to be addressed in the treatment plan.   Long Term Goals (LTGs); to be met by discharge.  LTG #1: Pt will report a pain level of 2 out of 10 with ADLs   LTG #2: Pt will demo improved FOTO score by 20 points.   LTG #3: Pt will return to prior level of function for ADLs and household management.     Short Term Goals (STGs); to be met within 4 weeks (11/05/2018).  STG #1a: Pt will report 4 out of 10 pain level with ADLs.  STG #2a: Pt will report/demo [place] with carrying >10# to " lift animals for work ADLs.  STG #2b: Pt will report/demo [place] with work ADLS per report  STG #3a: Pt will demonstrate independence with issued HEP.   STG #3b: Pt will demo improved  strength of right hand by 10#  needed to aid with work tasks.    Plan     Outpatient occupational therapy 2 times weekly for 8 weeks during the certification period from 10/8/2018 to 12/07/2018.    Treatment to include: Paraffin, Manual therapy/joint mobilizations, Modalities for pain management, US 3 mhz, Therapeutic exercises/activities., Strengthening, Edema Control and Scar Management, as well as any other treatments deemed necessary based on the patient's needs or progress.     Therapist: Lilliana Riley, SID, OTR/L, CHT   Occupational therapist, Certified Hand Therapist       I certify the need for these services furnished under this plan of treatment and while under my care    ____________________________________                         __________________  Physician/Referring Practitioner                                               Date of Signature

## 2018-10-15 ENCOUNTER — CLINICAL SUPPORT (OUTPATIENT)
Dept: REHABILITATION | Facility: HOSPITAL | Age: 28
End: 2018-10-15
Payer: MEDICAID

## 2018-10-15 DIAGNOSIS — R29.898 WEAKNESS OF RIGHT HAND: ICD-10-CM

## 2018-10-15 DIAGNOSIS — M25.531 PAIN IN RIGHT WRIST: ICD-10-CM

## 2018-10-15 PROCEDURE — 97530 THERAPEUTIC ACTIVITIES: CPT | Mod: PN

## 2018-10-15 NOTE — PROGRESS NOTES
"  Occupational Therapy Daily Treatment Note      Date: 10/15/2018  Name: Melani Sloan  Clinic Number: [id number]     Medical Diagnosis: Right Carpal tunnel s/p Release  Date of Surgery: 08/31/2018  Surgical Procedure: CTR  Therapy Diagnosis:        Encounter Diagnosis   Name Primary?    Pain in right wrist        Precautions: vagal nerve stimulator- Left neck/Left chest     Physician: Idalmis Waddell MD  Physician Orders: eval and treat- ROM/ strengthening  Date of Return to MD: 11/08/2018     Evaluation Date: 10/8/2018  Plan of Care Certification Period: 10/08/2018-12/08/2018     Visit #:/ Visits authorized: 2/ 16  Insurance Authorization period Expiration: 12/08/2018     Time In:910AM  Time Out: 950 AM  Total Billable Time: 30 minutes  Charges for this Visit: TA x 2    Subjective     Pt reports: " I have so much pain, I don't think the wax helps it. I need to do things that will get me back to work"   she was compliant with home exercise program given last session.   Response to previous treatment:pain  Functional change: none    Pain: 9/10  Location: right volar wrist and along incision    Objective *= involved side      Observation/Appearance: well healed incision- dense scar tissue present     Edema. Measured in centimeters.    10/8/2018 10/8/2018     Left Right *   Wrist Crease 15.5 15.5   MCPs 18.0 18.5            Elbow and Wrist ROM. Measured in degrees.    10/8/2018 10/8/2018     Left Right *     AROM AROM   Wrist Ext/Flex 65/ 90 45/45   Wrist RD/UD 30/40 25/33      Hand ROM. Measured in degrees.    10/8/2018 10/8/2018     Left Right*     AROM AROM          Opposition DPC DPC but tight/ uncomfortable       Strength (Dynamometer) and Pinch Strength (Pinch Gauge)  Measured in pounds.    10/8/2018 10/8/2018     Left Right *   Rung II 45 23   Key Pinch 11 10   3pt Pinch 14 10   2pt Pinch 8 8      Sensation: distal volar fingertips, out of the blue if her hand is in dependent position too long        CMS " Impairment/Limitation/Restriction for FOTO Survey  Therapist reviewed FOTO scores for Melani Sloan.  FOTO documents entered into [employer] - see Media section.     Intake Limitation Score: 49% - 10/8/2018        Melani received the following supervised modalities after being cleared for contradictions for 0 minutes: None today- per pt request          Melani received the following direct contact modalities after being cleared for contraindications for 0 minutes:  -none today     Melani received the following manual therapy techniques for 5 minutes:   -Performed retrograde massage to decrease edema, improve joint mobility, decrease pain and improve lymphatic drainage to increase hand function.   -Performed scar massage to CTR incision area to decrease adhesions and improve tensile glide.         Melani received therapeutic exercises for 30  minutes including:  -  AROM   Wrist flex/ext  Wrist RD/UD X 10 reps each   AROM - wave, hook, straight fist, composite fist, lifts, spreads, pinky slides X 10 reps each   Wrist PRE X 1# x 3/10 x 3 ways   Yellow putty X 3 min twirling  X 10 squeezes  X 10 lumbrical  taffy pulls  X 10 donut extensions     Rice gross grasp resisted X 3 min   Red flex bar X 2/10 frown/smiles       Patient received cold pack x 5 minutes to decrease pain/inflammation and edema following treatment session.           Melani participated in dynamic functional therapeutic activities to improve functional performance for 0  minutes, including:  -none today      Home Exercises and Education Provided     Education provided:   - Progress towards goals     Written Home Exercises Provided: Patient instructed to cont prior HEP.  Exercises were reviewed and Melani was able to demonstrate them prior to the end of the session.  Melani demonstrated good  understanding of the education provided.   .   See EMR under Patient Instructions for exercises provided prior visit.     Assessment     Melani Sloan is a  28 y.o. female referred to outpatient occupational/hand therapy and presents with a medical diagnosis of Right CTR s/p 6 weeks. She continues to c/o pain, numbness, and stiffness in her fingers as well. Pain spreads to back of hand per report. Pt. Educated on treatment modality of heat to decrease pain and increase tissue extensibility, however pt states she felt like it helped before sx but it didn't help with her pain last time. Deferred this date. Pt. With high pain level but able to complete all exercises without complaints of increased pain.  Pt. Motivated to return to work.     Melani is progressing well towards her goals and there are no updates to goals at this time. Pt prognosis continues as Good. Pt will continue to benefit from skilled outpatient occupational therapy to address the deficits listed in the problem list on initial evaluation, provide pt/family education and to maximize pt's level of independence in the home and community environment.     Anticipated barriers to continued occupational therapy: none    Pt's spiritual, cultural and educational needs considered and pt agreeable to plan of care and goals.    Goals     The following goals were discussed with the patient and patient is in agreement with them as to be addressed in the treatment plan.   Long Term Goals (LTGs); to be met by discharge.  LTG #1: Pt will report a pain level of 2 out of 10 with ADLs     LTG #2: Pt will demo improved FOTO score by 20 points.   LTG #3: Pt will return to prior level of function for ADLs and household management.      Short Term Goals (STGs); to be met within 4 weeks (11/05/2018).  STG #1a: Pt will report 4 out of 10 pain level with ADLs.  STG #2a: Pt will report/demo [place] with carrying >10# to lift animals for work ADLs.  STG #2b: Pt will report/demo [place] with work ADLS per report  STG #3a: Pt will demonstrate independence with issued HEP.   STG #3b: Pt will demo improved  strength of  right hand by 10#  needed to aid with work tasks.    Plan     Continue skilled occupational therapy with individualized plan of care 2x/week for 8 weeks from 10/08/2018 to 12/07/2018.    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

## 2018-10-17 ENCOUNTER — CLINICAL SUPPORT (OUTPATIENT)
Dept: REHABILITATION | Facility: HOSPITAL | Age: 28
End: 2018-10-17
Payer: MEDICAID

## 2018-10-17 DIAGNOSIS — M25.531 PAIN IN RIGHT WRIST: ICD-10-CM

## 2018-10-17 DIAGNOSIS — R29.898 WEAKNESS OF RIGHT HAND: ICD-10-CM

## 2018-10-17 PROCEDURE — 97530 THERAPEUTIC ACTIVITIES: CPT | Mod: PN

## 2018-10-17 NOTE — PROGRESS NOTES
"  Occupational Therapy Daily Treatment Note      Date: 10/17/2018  Name: Melani Sloan  Clinic Number: [id number]     Medical Diagnosis: Right Carpal tunnel s/p Release  Date of Surgery: 08/31/2018  Surgical Procedure: CTR  Therapy Diagnosis:        Encounter Diagnosis   Name Primary?    Pain in right wrist        Precautions: vagal nerve stimulator- Left neck/Left chest     Physician: Idalmis Waddell MD  Physician Orders: eval and treat- ROM/ strengthening  Date of Return to MD: 11/08/2018     Evaluation Date: 10/8/2018  Plan of Care Certification Period: 10/08/2018-12/08/2018     Visit #:/ Visits authorized: 3/ 16  Insurance Authorization period Expiration: 12/08/2018     Time In:10:00AM  Time Out: 11:00 AM  Total Billable Time: 30 minutes  Charges for this Visit: TA x 2    Subjective     Pt reports: " I want to try heat today and maybe not do it next time. Heat worked before the surgery but not last time we tried it."   she was compliant with home exercise program given last session.   Response to previous treatment:pain  Functional change: none    Pain: 8/10  Location: right volar wrist and along incision    Objective *= involved side      Observation/Appearance: well healed incision- dense scar tissue present     Edema. Measured in centimeters.    10/8/2018 10/8/2018     Left Right *   Wrist Crease 15.5 15.5   MCPs 18.0 18.5            Elbow and Wrist ROM. Measured in degrees.    10/8/2018 10/8/2018     Left Right *     AROM AROM   Wrist Ext/Flex 65/ 90 45/45   Wrist RD/UD 30/40 25/33      Hand ROM. Measured in degrees.    10/8/2018 10/8/2018     Left Right*     AROM AROM          Opposition DPC DPC but tight/ uncomfortable       Strength (Dynamometer) and Pinch Strength (Pinch Gauge)  Measured in pounds.    10/8/2018 10/8/2018     Left Right *   Rung II 45 23   Key Pinch 11 10   3pt Pinch 14 10   2pt Pinch 8 8      Sensation: distal volar fingertips, out of the blue if her hand is in dependent position " too long        CMS Impairment/Limitation/Restriction for FOTO Survey  Therapist reviewed FOTO scores for Melani Sloan.  FOTO documents entered into [employer] - see Media section.     Intake Limitation Score: 49% - 10/8/2018        Melani received the following supervised modalities after being cleared for contradictions for 15 minutes:   Patient received fluidotherapy to R hand(s) for 10 minutes to increase blood flow, circulation, desensitization, sensory re-education and for pain management.   Patient received cold pack x 5 minutes to decrease pain/inflammation and edema following treatment session.          Melani received the following direct contact modalities after being cleared for contraindications for 0 minutes:  -none today     Melani received the following manual therapy techniques for 5 minutes:   -Performed retrograde massage to decrease edema, improve joint mobility, decrease pain and improve lymphatic drainage to increase hand function.   -Performed scar massage to CTR incision area to decrease adhesions and improve tensile glide.         Melani received therapeutic exercises for 30  minutes including:  -  AROM   Wrist flex/ext  Wrist RD/UD X 10 reps each   AROM - wave, hook, straight fist, composite fist, lifts, spreads, pinky slides X 10 reps each   Wrist PRE X 1# x 3/10 x 3 ways   Yellow putty X 3 min twirling  X 10 squeezes  X 10 lumbrical  taffy pulls  X 10 donut extensions     Rice gross grasp resisted X 3 min   isospheres 2 min   Red flex bar X 2/10 frown/smiles   FM task and pinch strengthening with yellow clothespin and small pompoms 1 container       Patient received cold pack x 5 minutes to decrease pain/inflammation and edema following treatment session.           Melani participated in dynamic functional therapeutic activities to improve functional performance for 0  minutes, including:  -none today      Home Exercises and Education Provided     Education provided:   - Progress  towards goals     Written Home Exercises Provided: Patient instructed to cont prior HEP.  Exercises were reviewed and Melani was able to demonstrate them prior to the end of the session.  Melani demonstrated good  understanding of the education provided.   .   See EMR under Patient Instructions for exercises provided prior visit.     Assessment     Melani Sloan is a 28 y.o. female referred to outpatient occupational/hand therapy and presents with a medical diagnosis of Right CTR s/p 6 weeks. She continues to c/o pain, numbness, and stiffness in her fingers as well. Pt  C/o pain spreading to back of hand and in thumb.  Pt. Educated on treatment modality of heat to decrease pain and increase tissue extensibility again. Pt requested heat this date.  Pt. With high pain level but able to complete all exercises without complaints of increased pain.  Pt. Motivated to return to work.     Melani is progressing well towards her goals and there are no updates to goals at this time. Pt prognosis continues as Good. Pt will continue to benefit from skilled outpatient occupational therapy to address the deficits listed in the problem list on initial evaluation, provide pt/family education and to maximize pt's level of independence in the home and community environment.     Anticipated barriers to continued occupational therapy: none    Pt's spiritual, cultural and educational needs considered and pt agreeable to plan of care and goals.    Goals     The following goals were discussed with the patient and patient is in agreement with them as to be addressed in the treatment plan.   Long Term Goals (LTGs); to be met by discharge.  LTG #1: Pt will report a pain level of 2 out of 10 with ADLs     LTG #2: Pt will demo improved FOTO score by 20 points.   LTG #3: Pt will return to prior level of function for ADLs and household management.      Short Term Goals (STGs); to be met within 4 weeks (11/05/2018).  STG #1a: Pt will report 4  out of 10 pain level with ADLs.  STG #2a: Pt will report/demo [place] with carrying >10# to lift animals for work ADLs.  STG #2b: Pt will report/demo [place] with work ADLS per report  STG #3a: Pt will demonstrate independence with issued HEP.   STG #3b: Pt will demo improved  strength of right hand by 10#  needed to aid with work tasks.    Plan     Continue skilled occupational therapy with individualized plan of care 2x/week for 8 weeks from 10/08/2018 to 12/07/2018.    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

## 2018-10-22 ENCOUNTER — CLINICAL SUPPORT (OUTPATIENT)
Dept: REHABILITATION | Facility: HOSPITAL | Age: 28
End: 2018-10-22
Payer: MEDICAID

## 2018-10-22 DIAGNOSIS — M25.531 PAIN IN RIGHT WRIST: ICD-10-CM

## 2018-10-22 DIAGNOSIS — R29.898 WEAKNESS OF RIGHT HAND: ICD-10-CM

## 2018-10-22 PROCEDURE — 97530 THERAPEUTIC ACTIVITIES: CPT | Mod: PN

## 2018-10-22 NOTE — PROGRESS NOTES
"  Occupational Therapy Daily Treatment Note      Date: 10/22/2018  Name: Melani Sloan  Clinic Number: [id number]     Medical Diagnosis: Right Carpal tunnel s/p Release  Date of Surgery: 08/31/2018  Surgical Procedure: CTR  Therapy Diagnosis:        Encounter Diagnosis   Name Primary?    Pain in right wrist        Precautions: vagal nerve stimulator- Left neck/Left chest     Physician: Idalmis Waddell MD  Physician Orders: eval and treat- ROM/ strengthening  Date of Return to MD: 11/08/2018     Evaluation Date: 10/8/2018  Plan of Care Certification Period: 10/08/2018-12/08/2018     Visit #:/ Visits authorized: 4/ 16  Insurance Authorization period Expiration: 12/08/2018     Time In:955AM  Time Out: 1055AM  Total Billable Time: 40 minutes  Charges for this Visit: TA x 3    Subjective     Pt reports: " It is feeling better"   she was compliant with home exercise program given last session.   Response to previous treatment:pain  Functional change: none    Pain: 6/10  Location: right thumb along thenar side of incision    Objective *= involved side      Observation/Appearance: well healed incision- dense scar tissue present     Edema. Measured in centimeters.    10/8/2018 10/8/2018     Left Right *   Wrist Crease 15.5 15.5   MCPs 18.0 18.5            Elbow and Wrist ROM. Measured in degrees.    10/8/2018 10/8/2018 10/22/2018     Left Right * Right     AROM AROM AROM   Wrist Ext/Flex 65/ 90 45/45 65/75 (+20/+30)   Wrist RD/UD 30/40 25/33 25/30 (=/-3)      Hand ROM. Measured in degrees.    10/8/2018 10/8/2018     Left Right*     AROM AROM          Opposition DPC DPC but tight/ uncomfortable       Strength (Dynamometer) and Pinch Strength (Pinch Gauge)  Measured in pounds.    10/8/2018 10/8/2018 10/22/2018     Left Right * Right   Rung II 45 23 20 (-3)   Scott Pinch 11 10 7 (-3)   3pt Pinch 14 10 8 (-2)   2pt Pinch 8 8 9 (+1)      Sensation: distal volar fingertips, out of the blue if her hand is in dependent position " too long        CMS Impairment/Limitation/Restriction for FOTO Survey  Therapist reviewed FOTO scores for Melani Sloan.  FOTO documents entered into [employer] - see Media section.     Intake Limitation Score: 49% - 10/8/2018        Melani received the following supervised modalities after being cleared for contradictions for 15 minutes:   Patient received fluidotherapy to R hand(s) for 10 minutes to increase blood flow, circulation, desensitization, sensory re-education and for pain management. - lowered temp  Patient received cold pack x 5 minutes to decrease pain/inflammation and edema following treatment session.          Melani received the following direct contact modalities after being cleared for contraindications for 0 minutes:  -none today     Melani received the following manual therapy techniques for 5 minutes:   -Performed retrograde massage to decrease edema, improve joint mobility, decrease pain and improve lymphatic drainage to increase hand function.   -Performed scar massage to CTR incision area to decrease adhesions and improve tensile glide.         Melani received therapeutic exercises for 30  minutes including:  -  AROM   Wrist flex/ext  Wrist RD/UD X 10 reps each   AROM - wave, hook, straight fist, composite fist, lifts, spreads, pinky slides X 10 reps each   Wrist PRE X 2# x 1/10 x 3 ways  X1# x 2/10 x 3 ways   Yellow putty X 3 min twirling  X 10 squeezes  X 10 lumbrical  squeeze  X 5 donut extensions  X 3 logs each: lateral pinch, 3 pt    Rice gross grasp resisted X 3 min   isospheres 2 min   Red flex bar X 2/10 frown/smiles   FM task and pinch strengthening with blue clothespin and small pompoms 1 container       Patient received cold pack x 5 minutes to decrease pain/inflammation and edema following treatment session.           Melani participated in dynamic functional therapeutic activities to improve functional performance for 0  minutes, including:  -none today      Home Exercises  and Education Provided     Education provided:   - Progress towards goals     Written Home Exercises Provided: Patient instructed to cont prior HEP. Additional written HEP for theraputty- provided yellow low resistance putty for home use  Exercises were reviewed and Melani was able to demonstrate them prior to the end of the session.  Melani demonstrated good  understanding of the education provided.   .   See EMR under Patient Instructions for exercises provided prior visit.     Assessment     Melani Sloan is a 28 y.o. female referred to outpatient occupational/hand therapy and presents with a medical diagnosis of Right CTR s/p 7 weeks. AROM wrist ext/flx with significant improvements. RD/UD with out change.  and pinch strength with decreases in all.  She continues to c/o pain, numbness, and stiffness in her fingers as well. Pt  C/o pain spreading to back of hand and in thumb. Able to increase weight on wrist PRE for 1 set. Added theraputty to HEP for strengthening.     Melani is progressing well towards her goals and there are no updates to goals at this time. Pt prognosis continues as Good. Pt will continue to benefit from skilled outpatient occupational therapy to address the deficits listed in the problem list on initial evaluation, provide pt/family education and to maximize pt's level of independence in the home and community environment.     Anticipated barriers to continued occupational therapy: none    Pt's spiritual, cultural and educational needs considered and pt agreeable to plan of care and goals.    Goals     The following goals were discussed with the patient and patient is in agreement with them as to be addressed in the treatment plan.   Long Term Goals (LTGs); to be met by discharge.  LTG #1: Pt will report a pain level of 2 out of 10 with ADLs     LTG #2: Pt will demo improved FOTO score by 20 points.   LTG #3: Pt will return to prior level of function for ADLs and household  management.      Short Term Goals (STGs); to be met within 4 weeks (11/05/2018).  STG #1a: Pt will report 4 out of 10 pain level with ADLs.  STG #2a: Pt will report/demo [place] with carrying >10# to lift animals for work ADLs.  STG #2b: Pt will report/demo [place] with work ADLS per report  STG #3a: Pt will demonstrate independence with issued HEP.   STG #3b: Pt will demo improved  strength of right hand by 10#  needed to aid with work tasks.    Plan     Continue skilled occupational therapy with individualized plan of care 2x/week for 8 weeks from 10/08/2018 to 12/07/2018.    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

## 2018-10-22 NOTE — PATIENT INSTRUCTIONS
[facility] [place]  [phone]  JUSTINO BARRERA, OTEUGENIE, OTR/L, CHT  OCCUPATIONAL THERAPIST, CERTIFIED HAND THERAPIST

## 2018-10-24 ENCOUNTER — CLINICAL SUPPORT (OUTPATIENT)
Dept: REHABILITATION | Facility: HOSPITAL | Age: 28
End: 2018-10-24
Payer: MEDICAID

## 2018-10-24 DIAGNOSIS — R29.898 WEAKNESS OF RIGHT HAND: ICD-10-CM

## 2018-10-24 DIAGNOSIS — M25.531 PAIN IN RIGHT WRIST: ICD-10-CM

## 2018-10-24 PROCEDURE — 97530 THERAPEUTIC ACTIVITIES: CPT | Mod: PN

## 2018-10-24 NOTE — PROGRESS NOTES
"  Occupational Therapy Daily Treatment Note      Date: 10/24/2018  Name: Melani Sloan  Clinic Number: [id number]     Medical Diagnosis: Right Carpal tunnel s/p Release  Date of Surgery: 08/31/2018  Surgical Procedure: CTR  Therapy Diagnosis:        Encounter Diagnosis   Name Primary?    Pain in right wrist        Precautions: vagal nerve stimulator- Left neck/Left chest     Physician: Idalmis Waddell MD  Physician Orders: eval and treat- ROM/ strengthening  Date of Return to MD: 11/08/2018     Evaluation Date: 10/8/2018  Plan of Care Certification Period: 10/08/2018-12/08/2018     Visit #:/ Visits authorized: 4/ 16  Insurance Authorization period Expiration: 12/08/2018     Time In:955AM  Time Out: 1045AM  Total Billable Time: 40 minutes  Charges for this Visit: TA x 3    Subjective     Pt reports: " It felt better the other day, but today it's hurting" Pt concerned about having enough strength in hand to return to work.  she was compliant with home exercise program given last session.   Response to previous treatment:pain  Functional change: none    Pain: 8/10  Location: right thumb along thenar side of incision, and in hypothenar as well    Objective *= involved side      Observation/Appearance: well healed incision- dense scar tissue present     Edema. Measured in centimeters.    10/8/2018 10/8/2018     Left Right *   Wrist Crease 15.5 15.5   MCPs 18.0 18.5            Elbow and Wrist ROM. Measured in degrees.    10/8/2018 10/8/2018 10/22/2018     Left Right * Right     AROM AROM AROM   Wrist Ext/Flex 65/ 90 45/45 65/75 (+20/+30)   Wrist RD/UD 30/40 25/33 25/30 (=/-3)      Hand ROM. Measured in degrees.    10/8/2018 10/8/2018     Left Right*     AROM AROM          Opposition DPC DPC but tight/ uncomfortable       Strength (Dynamometer) and Pinch Strength (Pinch Gauge)  Measured in pounds.    10/8/2018 10/8/2018 10/22/2018     Left Right * Right   Rung II 45 23 20 (-3)   Scott Pinch 11 10 7 (-3)   3pt Pinch 14 " 10 8 (-2)   2pt Pinch 8 8 9 (+1)      Sensation: distal volar fingertips, out of the blue if her hand is in dependent position too long        CMS Impairment/Limitation/Restriction for FOTO Survey  Therapist reviewed FOTO scores for Melani Sloan.  FOTO documents entered into EPIC - see Media section.     Intake Limitation Score: 49% - 10/8/2018        Melani received the following supervised modalities after being cleared for contradictions for 5 minutes:   Pt declined today: Patient received fluidotherapy to R hand(s) for 10 minutes to increase blood flow, circulation, desensitization, sensory re-education and for pain management.   Patient received cold pack x 5 minutes to decrease pain/inflammation and edema following treatment session.          Melani received the following direct contact modalities after being cleared for contraindications for 0 minutes:  -none today     Melani received the following manual therapy techniques for 10 minutes:   -Performed retrograde massage to decrease edema, improve joint mobility, decrease pain and improve lymphatic drainage to increase hand function.   -Performed scar massage to CTR incision area to decrease adhesions and improve tensile glide.         Melani received therapeutic exercises for 30  minutes including:  -  AROM   Wrist flex/ext  Wrist RD/UD X 10 reps each   AROM - wave, hook, straight fist, composite fist, lifts, spreads, pinky slides X 10 reps each   Wrist PRE X 2# x 1/10 x 3 ways  X1# x 2/10 x 3 ways   Yellow putty X 3 min twirling  X 10 squeezes  X 10 lumbrical  squeeze  X 5 donut extensions  X 3 logs each: lateral pinch, 3 pt    Rice gross grasp resisted X 3 min   isospheres 2 min   Red flex bar X 2/10 frown/smiles   FM task and pinch strengthening with blue clothespin and small pompoms 1 container          Melani participated in dynamic functional therapeutic activities to improve functional performance for 0  minutes, including:  -none  today      Home Exercises and Education Provided     Education provided:   - Progress towards goals     Written Home Exercises Provided: Patient instructed to cont prior HEP. Additional written HEP for theraputty- provided yellow low resistance putty for home use  Exercises were reviewed and Melani was able to demonstrate them prior to the end of the session.  Melani demonstrated good  understanding of the education provided.   .   See EMR under Patient Instructions for exercises provided prior visit.     Assessment     Melani Sloan is a 28 y.o. female referred to outpatient occupational/hand therapy and presents with a medical diagnosis of Right CTR s/p 7 weeks. Pt cont with c/o significant pain and weakness in hand. Fair tolerance of ex. Pt has been slow to progress with strengthening and ex thus far.  She continues to c/o pain, numbness, and stiffness in her fingers as well. Pt  C/o pain spreading to back of hand and in thumb.    Melani is progressing fairly well towards her goals and there are no updates to goals at this time. Pt prognosis continues as Good. Pt will continue to benefit from skilled outpatient occupational therapy to address the deficits listed in the problem list on initial evaluation, provide pt/family education and to maximize pt's level of independence in the home and community environment.     Anticipated barriers to continued occupational therapy: none    Pt's spiritual, cultural and educational needs considered and pt agreeable to plan of care and goals.    Goals     The following goals were discussed with the patient and patient is in agreement with them as to be addressed in the treatment plan.   Long Term Goals (LTGs); to be met by discharge.  LTG #1: Pt will report a pain level of 2 out of 10 with ADLs     LTG #2: Pt will demo improved FOTO score by 20 points.   LTG #3: Pt will return to prior level of function for ADLs and household management.      Short Term Goals (STGs); to  be met within 4 weeks (11/05/2018).  STG #1a: Pt will report 4 out of 10 pain level with ADLs.  STG #2a: Pt will report/demo [place] with carrying >10# to lift animals for work ADLs.  STG #2b: Pt will report/demo [place] with work ADLS per report  STG #3a: Pt will demonstrate independence with issued HEP.   STG #3b: Pt will demo improved  strength of right hand by 10#  needed to aid with work tasks.    Plan     Continue skilled occupational therapy with individualized plan of care 2x/week for 8 weeks from 10/08/2018 to 12/07/2018.    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

## 2018-10-29 ENCOUNTER — CLINICAL SUPPORT (OUTPATIENT)
Dept: REHABILITATION | Facility: HOSPITAL | Age: 28
End: 2018-10-29
Payer: MEDICAID

## 2018-10-29 DIAGNOSIS — M25.531 PAIN IN RIGHT WRIST: ICD-10-CM

## 2018-10-29 DIAGNOSIS — R29.898 WEAKNESS OF RIGHT HAND: ICD-10-CM

## 2018-10-29 PROCEDURE — 97530 THERAPEUTIC ACTIVITIES: CPT | Mod: PN

## 2018-10-29 NOTE — PROGRESS NOTES
"  Occupational Therapy Daily Treatment Note      Date: 10/29/2018  Name: Melani Sloan  Clinic Number: [id number]     Medical Diagnosis: Right Carpal tunnel s/p Release  Date of Surgery: 08/31/2018  Surgical Procedure: CTR  Therapy Diagnosis:        Encounter Diagnosis   Name Primary?    Pain in right wrist        Precautions: vagal nerve stimulator- Left neck/Left chest     Physician: Idalmis Waddell MD  Physician Orders: eval and treat- ROM/ strengthening  Date of Return to MD: 11/08/2018     Evaluation Date: 10/8/2018  Plan of Care Certification Period: 10/08/2018-12/08/2018     Visit #:/ Visits authorized: 5/ 16  Insurance Authorization period Expiration: 12/08/2018     Time In:955AM  Time Out: 1045AM  Total Billable Time: 45 minutes  Charges for this Visit: TA x 3     Subjective     Pt reports: " Since I came last time I have been having a lot of pain in it. I think it was the 2# weight. It hurts all along the bottom of the hand and into the wrist"   she was compliant with home exercise program given last session.   Response to previous treatment:pain  Functional change: none    Pain: 8-9/10  Location: right thumb along thenar side of incision, and in hypothenar as well    Objective *= involved side      Observation/Appearance: well healed incision- dense scar tissue present     Edema. Measured in centimeters.    10/8/2018 10/8/2018 10/29/2018     Left Right * Right*   Wrist Crease 15.5 15.5 15.0 (-0.5)   MCPs 18.0 18.5             Elbow and Wrist ROM. Measured in degrees.    10/8/2018 10/8/2018 10/22/2018 10/29/2018     Left Right * Right Right     AROM AROM AROM AROM   Wrist Ext/Flex 65/ 90 45/45 65/75 (+20/+30) 65/65 (=/-10)   Wrist RD/UD 30/40 25/33 25/30 (=/-3) 25/30 (=/=)      Hand ROM. Measured in degrees.    10/8/2018 10/8/2018     Left Right*     AROM AROM          Opposition DPC DPC but tight/ uncomfortable       Strength (Dynamometer) and Pinch Strength (Pinch Gauge)  Measured in pounds.    " 10/8/2018 10/8/2018 10/22/2018 10/29/2018     Left Right * Right Right   Rung II 45 23 20 (-3) 27 (+7)   Scott Pinch 11 10 7 (-3) 9 (+2)   3pt Pinch 14 10 8 (-2) 10 (+2)   2pt Pinch 8 8 9 (+1) 9 (=)      Sensation: distal volar fingertips, out of the blue if her hand is in dependent position too long        CMS Impairment/Limitation/Restriction for FOTO Survey  Therapist reviewed FOTO scores for Melani Sloan.  FOTO documents entered into [employer] - see Media section.     Intake Limitation Score: 49% - 10/8/2018  5th visit limitation score: 55%-10/29/2018        Melani received the following supervised modalities after being cleared for contradictions for 5 minutes:     Patient received cold pack x 5 minutes to decrease pain/inflammation and edema following treatment session.          Melani received the following direct contact modalities after being cleared for contraindications for 8 minutes:  -Patient received ultrasound to  Carpal tunnel incision and thenar/hypothenar area to increase blood flow, circulation, tissue elasticity, pain management and for wound/scar management for 8 minutes @ 3 Mhz, Intensity 0.8 w/cm2 at 20% duty cycle.        Melani received the following manual therapy techniques for 5minutes:   -Performed retrograde massage to decrease edema, improve joint mobility, decrease pain and improve lymphatic drainage to increase hand function.   -Performed scar massage to CTR incision area to decrease adhesions and improve tensile glide.         Melani received therapeutic exercises for 30   minutes including:  -  AROM   Wrist flex/ext  Wrist RD/UD X 10 reps each   AROM - wave, hook, straight fist, composite fist, lifts, spreads, pinky slides X 10 reps each   Prayer stretch 30 sec x 3   Wrist PRE X1# x 3/10 x 3 ways   Yellow putty X 3 min twirling  X 10 squeezes  X 10 lumbrical  squeeze  X 5 donut extensions- NT pain  X 3 logs each: lateral pinch, 3 pt    Rice gross grasp resisted X 3 min    isospheres 2 min   Red flex bar X 2/10 frown/smiles   FM task and pinch strengthening with blue- finished with green CP clothespin and small pompoms 1 container   Blue foam add/ yellow RB abd  X 20 each      Kinesiology tape applied over volar wrist/CT area - space correction I strip- educated on wear, care, and precautions of tape       Melani participated in dynamic functional therapeutic activities to improve functional performance for 0  minutes, including:  -none today      Home Exercises and Education Provided     Education provided: Cont with massage, add prayer stretch 30 sec holds x 3 to HEP, cont to work with putty and try to squeeze as much as she can with gripping putty, try to use hand more normally and use ice as needed. Pt. Verbalized understanding.   - Progress towards goals     Written Home Exercises Provided: Patient instructed to cont prior HEP. Additional written HEP for theraputty- provided yellow low resistance putty for home use  Exercises were reviewed and Melani was able to demonstrate them prior to the end of the session.  Melani demonstrated good  understanding of the education provided.   .   See EMR under Patient Instructions for exercises provided prior visit.     Assessment     Melani Sloan is a 28 y.o. female referred to outpatient occupational/hand therapy and presents with a medical diagnosis of Right CTR s/p 8 weeks. Pt cont with c/o significant pain and weakness in hand. She states she cannot hold a plastic grocery bag. AROM with loses of wrist flexion.  and pinch strength with significant increase (7# of  strength ). Pt. States she does the putty 2xwk and she tries to squeeze but cannot squeeze it all the way. She reports the donut extensions hurt her so she does not do them. She reports not using her hand for typical activities due to pain.  Pt has been slow to progress with strengthening and ex thus far.Completed all wrist PRE with 1# weight this date. Added  "intrinsic hand strengthening without complaints. Pt. Completes all therex without complaints this date. Reports " It hurt's a little bit" at end of session.        Melani is progressing fairly well towards her goals and there are no updates to goals at this time. Pt prognosis continues as Good. Pt will continue to benefit from skilled outpatient occupational therapy to address the deficits listed in the problem list on initial evaluation, provide pt/family education and to maximize pt's level of independence in the home and community environment.     Anticipated barriers to continued occupational therapy: none    Pt's spiritual, cultural and educational needs considered and pt agreeable to plan of care and goals.    Goals     The following goals were discussed with the patient and patient is in agreement with them as to be addressed in the treatment plan.   Long Term Goals (LTGs); to be met by discharge.  LTG #1: Pt will report a pain level of 2 out of 10 with ADLs     LTG #2: Pt will demo improved FOTO score by 20 points.   LTG #3: Pt will return to prior level of function for ADLs and household management.      Short Term Goals (STGs); to be met within 4 weeks (11/05/2018).  STG #1a: Pt will report 4 out of 10 pain level with ADLs.  STG #2a: Pt will report/demo [place] with carrying >10# to lift animals for work ADLs.  STG #2b: Pt will report/demo [place] with work ADLS per report  STG #3a: Pt will demonstrate independence with issued HEP.   STG #3b: Pt will demo improved  strength of right hand by 10#  needed to aid with work tasks.    Plan     Continue skilled occupational therapy with individualized plan of care 2x/week for 8 weeks from 10/08/2018 to 12/07/2018.    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          "

## 2018-10-31 ENCOUNTER — CLINICAL SUPPORT (OUTPATIENT)
Dept: REHABILITATION | Facility: HOSPITAL | Age: 28
End: 2018-10-31
Payer: MEDICAID

## 2018-10-31 DIAGNOSIS — M25.531 PAIN IN RIGHT WRIST: ICD-10-CM

## 2018-10-31 DIAGNOSIS — R29.898 WEAKNESS OF RIGHT HAND: ICD-10-CM

## 2018-10-31 PROCEDURE — 97530 THERAPEUTIC ACTIVITIES: CPT | Mod: PN

## 2018-10-31 NOTE — PROGRESS NOTES
"  Occupational Therapy Daily Treatment Note      Date: 10/31/2018  Name: Melani Sloan  Clinic Number: [id number]     Medical Diagnosis: Right Carpal tunnel s/p Release  Date of Surgery: 08/31/2018  Surgical Procedure: CTR  Therapy Diagnosis:        Encounter Diagnosis   Name Primary?    Pain in right wrist        Precautions: vagal nerve stimulator- Left neck/Left chest     Physician: Idalmis Waddell MD  Physician Orders: eval and treat- ROM/ strengthening  Date of Return to MD: 11/08/2018     Evaluation Date: 10/8/2018  Plan of Care Certification Period: 10/08/2018-12/08/2018     Visit #:/ Visits authorized: 6/ 16  Insurance Authorization period Expiration: 12/08/2018     Time In:10AM  Time Out: 1055AM  Total Billable Time: 40 minutes (direct 1:1)  Charges for this Visit: TA x 3    Subjective     Pt reports: " The tape came off in a day. I have pain in the morning and at night and I use ice then. It hurts to  something"   she was compliant with home exercise program given last session.   Response to previous treatment:pain  Functional change: none    Pain: 6-7/10  Location: right thumb along thenar side of incision, and in hypothenar as well    Objective *= involved side      Observation/Appearance: well healed incision- dense scar tissue present     Edema. Measured in centimeters.    10/8/2018 10/8/2018 10/29/2018     Left Right * Right*   Wrist Crease 15.5 15.5 15.0 (-0.5)   MCPs 18.0 18.5             Elbow and Wrist ROM. Measured in degrees.    10/8/2018 10/8/2018 10/22/2018 10/29/2018     Left Right * Right Right     AROM AROM AROM AROM   Wrist Ext/Flex 65/ 90 45/45 65/75 (+20/+30) 65/65 (=/-10)   Wrist RD/UD 30/40 25/33 25/30 (=/-3) 25/30 (=/=)      Hand ROM. Measured in degrees.    10/8/2018 10/8/2018     Left Right*     AROM AROM          Opposition DPC DPC but tight/ uncomfortable       Strength (Dynamometer) and Pinch Strength (Pinch Gauge)  Measured in pounds.    10/8/2018 10/8/2018 " 10/22/2018 10/29/2018     Left Right * Right Right   Rung II 45 23 20 (-3) 27 (+7)   Scott Pinch 11 10 7 (-3) 9 (+2)   3pt Pinch 14 10 8 (-2) 10 (+2)   2pt Pinch 8 8 9 (+1) 9 (=)      Sensation: distal volar fingertips, out of the blue if her hand is in dependent position too long        CMS Impairment/Limitation/Restriction for FOTO Survey  Therapist reviewed FOTO scores for Melani Sloan.  FOTO documents entered into EPIC - see Media section.     Intake Limitation Score: 49% - 10/8/2018  5th visit limitation score: 55%-10/29/2018        Melani received the following supervised modalities after being cleared for contradictions for 5 minutes:     Patient received cold pack x 5 minutes to decrease pain/inflammation and edema following treatment session.          Melani received the following direct contact modalities after being cleared for contraindications for 8 minutes:  -Patient received ultrasound to  Carpal tunnel incision and thenar/hypothenar area to increase blood flow, circulation, tissue elasticity, pain management and for wound/scar management for 8 minutes @ 3 Mhz, Intensity 0.8 w/cm2 at 20% duty cycle.        Melani received the following manual therapy techniques for 5minutes:   -Performed retrograde massage to decrease edema, improve joint mobility, decrease pain and improve lymphatic drainage to increase hand function.   -Performed scar massage to CTR incision area to decrease adhesions and improve tensile glide.         Melani received therapeutic exercises for 30   minutes including:  -  AROM   Wrist flex/ext  Wrist RD/UD X 10 reps each   AROM - wave, hook, straight fist, composite fist, lifts, spreads, pinky slides X 10 reps each   Prayer stretch 30 sec x 3   Wrist PRE X1# x 3/10 x 3 ways   Yellow putty X 3 min twirling  X 10 squeezes  X 10 lumbrical  squeeze  X 5 donut extensions- NT pain  X 3 logs each: lateral pinch, 3 pt    Rice gross grasp resisted X 3 min   isospheres 2 min   Red  flex bar X 2/10 frown/smiles   FM task and pinch strengthening with blue-  CP clothespin and medium pompoms 1 container   Blue foam add/ yellow RB abd  X 20 each           Melani participated in dynamic functional therapeutic activities to improve functional performance for 0  minutes, including:  -none today      Home Exercises and Education Provided     Education provided: use of golf ball for scar massage- provided golf ball for use at home. Encouraged pt to continue attempting typical daily use to increase endurance and strength. Pt. demo'd understanding.   - Progress towards goals     Written Home Exercises Provided: Patient instructed to cont prior HEP. Additional written HEP for theraputty- provided yellow low resistance putty for home use  Exercises were reviewed and Melani was able to demonstrate them prior to the end of the session.  Melani demonstrated good  understanding of the education provided.   .   See EMR under Patient Instructions for exercises provided prior visit.     Assessment     Melani Sloan is a 28 y.o. female referred to outpatient occupational/hand therapy and presents with a medical diagnosis of Right CTR s/p 8 weeks. Pt cont with c/o significant pain and weakness in hand. She states she has pain at night and in the morning and she uses ice for that. She reports pain with holding things. She is not sure if it is where the item is pressing on the hand or just weakness. Educated pt that soreness is to be expected with return to use and importance of continuing to resume normal activities to regain strength.   Pt has been slow to progress with strengthening and ex thus far.Completed all wrist PRE with 1# weight this date. Continued intrinsic hand strengthening without complaints. Pt. Completes all therex without complaints this date. Ice completed after session per pt request. Pt. States she may want to try the paraffin next treatment.  No tape applied today due to poor adherence last  time.      Melani is progressing fairly well towards her goals and there are no updates to goals at this time. Pt prognosis continues as Good. Pt will continue to benefit from skilled outpatient occupational therapy to address the deficits listed in the problem list on initial evaluation, provide pt/family education and to maximize pt's level of independence in the home and community environment.     Anticipated barriers to continued occupational therapy: none    Pt's spiritual, cultural and educational needs considered and pt agreeable to plan of care and goals.    Goals     The following goals were discussed with the patient and patient is in agreement with them as to be addressed in the treatment plan.   Long Term Goals (LTGs); to be met by discharge.  LTG #1: Pt will report a pain level of 2 out of 10 with ADLs     LTG #2: Pt will demo improved FOTO score by 20 points.   LTG #3: Pt will return to prior level of function for ADLs and household management.      Short Term Goals (STGs); to be met within 4 weeks (11/05/2018).  STG #1a: Pt will report 4 out of 10 pain level with ADLs.  STG #2a: Pt will report/demo [place] with carrying >10# to lift animals for work ADLs.  STG #2b: Pt will report/demo [place] with work ADLS per report  STG #3a: Pt will demonstrate independence with issued HEP.   STG #3b: Pt will demo improved  strength of right hand by 10#  needed to aid with work tasks.    Plan     Continue skilled occupational therapy with individualized plan of care 2x/week for 8 weeks from 10/08/2018 to 12/07/2018.    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

## 2018-11-05 ENCOUNTER — CLINICAL SUPPORT (OUTPATIENT)
Dept: REHABILITATION | Facility: HOSPITAL | Age: 28
End: 2018-11-05
Payer: MEDICAID

## 2018-11-05 DIAGNOSIS — M25.531 PAIN IN RIGHT WRIST: ICD-10-CM

## 2018-11-05 DIAGNOSIS — R29.898 WEAKNESS OF RIGHT HAND: ICD-10-CM

## 2018-11-05 PROCEDURE — 97530 THERAPEUTIC ACTIVITIES: CPT | Mod: PN

## 2018-11-05 NOTE — PROGRESS NOTES
"  Occupational Therapy Daily Treatment Note      Date: 11/5/2018  Name: Melani Sloan  Clinic Number: [id number]     Medical Diagnosis: Right Carpal tunnel s/p Release  Date of Surgery: 08/31/2018  Surgical Procedure: CTR  Therapy Diagnosis:        Encounter Diagnosis   Name Primary?    Pain in right wrist        Precautions: vagal nerve stimulator- Left neck/Left chest     Physician: Idalmis Waddell MD  Physician Orders: eval and treat- ROM/ strengthening  Date of Return to MD: 11/08/2018     Evaluation Date: 10/8/2018  Plan of Care Certification Period: 10/08/2018-12/08/2018     Visit #:/ Visits authorized: 7/ 16  Insurance Authorization period Expiration: 12/08/2018     Time In:10AM  Time Out: 1051AM  Total Billable Time: 46 minutes (direct 1:1)  Charges for this Visit: TA x 3    Subjective     Pt reports: " I'm not having that much pain today"    she was compliant with home exercise program given last session.   Response to previous treatment:pain  Functional change: none    Pain: 5/10  Location: right thumb along thenar side of incision, and in hypothenar as well    Objective *= involved side      Observation/Appearance: well healed incision- dense scar tissue present     Edema. Measured in centimeters.    10/8/2018 10/8/2018 10/29/2018     Left Right * Right*   Wrist Crease 15.5 15.5 15.0 (-0.5)   MCPs 18.0 18.5             Elbow and Wrist ROM. Measured in degrees.    10/8/2018 10/8/2018 10/22/2018 10/29/2018     Left Right * Right Right     AROM AROM AROM AROM   Wrist Ext/Flex 65/ 90 45/45 65/75 (+20/+30) 65/65 (=/-10)   Wrist RD/UD 30/40 25/33 25/30 (=/-3) 25/30 (=/=)      Hand ROM. Measured in degrees.    10/8/2018 10/8/2018     Left Right*     AROM AROM          Opposition DPC DPC but tight/ uncomfortable       Strength (Dynamometer) and Pinch Strength (Pinch Gauge)  Measured in pounds.    10/8/2018 10/8/2018 10/22/2018 10/29/2018     Left Right * Right Right   Rung II 45 23 20 (-3) 27 (+7)   Key " Pinch 11 10 7 (-3) 9 (+2)   3pt Pinch 14 10 8 (-2) 10 (+2)   2pt Pinch 8 8 9 (+1) 9 (=)      Sensation: distal volar fingertips, out of the blue if her hand is in dependent position too long        CMS Impairment/Limitation/Restriction for FOTO Survey  Therapist reviewed FOTO scores for Melani Sloan.  FOTO documents entered into EPIC - see Media section.     Intake Limitation Score: 49% - 10/8/2018  5th visit limitation score: 55%-10/29/2018        Melani received the following supervised modalities after being cleared for contradictions for 5 minutes:     Patient received cold pack x 5 minutes to decrease pain/inflammation and edema following treatment session.          Melani received the following direct contact modalities after being cleared for contraindications for 8 minutes:  -Patient received ultrasound to  Carpal tunnel incision and thenar/hypothenar area to increase blood flow, circulation, tissue elasticity, pain management and for wound/scar management for 8 minutes @ 3 Mhz, Intensity 0.8 w/cm2 at 20% duty cycle.        Melani received the following manual therapy techniques for 5minutes:   -Performed retrograde massage to decrease edema, improve joint mobility, decrease pain and improve lymphatic drainage to increase hand function.   -Performed scar massage to CTR incision area to decrease adhesions and improve tensile glide.         Melani received therapeutic exercises for 33   minutes including:  -  AROM   Wrist flex/ext  Wrist RD/UD X 10 reps each   AROM - wave, hook, straight fist, composite fist, lifts, spreads, pinky slides X 10 reps each   Prayer stretch 10 sec x 3   Wrist PRE X2# x 3/10 x 3 ways     Yellow putty X 3 min twirling  X 10 squeezes  X 10 lumbrical  squeeze  X 5 donut extensions-   X 3 logs each: lateral pinch, 3 pt    Rice gross grasp resisted X 3 min   isospheres 2 min   Red flex bar X 2/10 frown/smiles   FM task and pinch strengthening with blue-  CP clothespin and  medium pompoms 1 container   Blue foam add/ yellow RB abd  X 20 each           Melani participated in dynamic functional therapeutic activities to improve functional performance for 0  minutes, including:  -none today      Home Exercises and Education Provided     Education provided: use of golf ball for scar massage- provided golf ball for use at home. Encouraged pt to continue attempting typical daily use to increase endurance and strength. Pt. demo'd understanding.   - Progress towards goals     Written Home Exercises Provided: Patient instructed to cont prior HEP. Additional written HEP for theraputty- provided yellow low resistance putty for home use  Exercises were reviewed and Melani was able to demonstrate them prior to the end of the session.  Melani demonstrated good  understanding of the education provided.   .   See EMR under Patient Instructions for exercises provided prior visit.     Assessment     Melani Sloan is a 28 y.o. female referred to outpatient occupational/hand therapy and presents with a medical diagnosis of Right CTR s/p 9.5 weeks. Pt with  Less pain today  Reported. She reports pain at in the morning. She reports it is in the wrist where she has pain. She has some pain with the prayer stretch- educated pt to only hold 10 sec and not push as hard to decrease pain over dorsal wrist with stretch. She says the golf ball is helpful. She has pain in the She reports being able to do a little work over the weekend . Continued educating pt that soreness is to be expected with return to use and importance of continuing to resume normal activities to regain strength.   Pt has been slow to progress with strengthening and ex thus far but seems to be improving. Pt. Able to completed all wrist PRE with 2# weight this date. Continued intrinsic hand strengthening without complaints. Pt. Completes all therex without complaints this date. Pt. States she is hurting after treatment session and requested  charan Polo is progressing fairly well towards her goals and there are no updates to goals at this time. Pt prognosis continues as Good. Pt will continue to benefit from skilled outpatient occupational therapy to address the deficits listed in the problem list on initial evaluation, provide pt/family education and to maximize pt's level of independence in the home and community environment.     Anticipated barriers to continued occupational therapy: none    Pt's spiritual, cultural and educational needs considered and pt agreeable to plan of care and goals.    Goals     The following goals were discussed with the patient and patient is in agreement with them as to be addressed in the treatment plan.   Long Term Goals (LTGs); to be met by discharge.  LTG #1: Pt will report a pain level of 2 out of 10 with ADLs     LTG #2: Pt will demo improved FOTO score by 20 points.   LTG #3: Pt will return to prior level of function for ADLs and household management.      Short Term Goals (STGs); to be met within 4 weeks (11/05/2018).  STG #1a: Pt will report 4 out of 10 pain level with ADLs.  STG #2a: Pt will report/demo [place] with carrying >10# to lift animals for work ADLs.  STG #2b: Pt will report/demo [place] with work ADLS per report  STG #3a: Pt will demonstrate independence with issued HEP.   STG #3b: Pt will demo improved  strength of right hand by 10#  needed to aid with work tasks.    Plan     Continue skilled occupational therapy with individualized plan of care 2x/week for 8 weeks from 10/08/2018 to 12/07/2018.    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

## 2018-11-07 ENCOUNTER — CLINICAL SUPPORT (OUTPATIENT)
Dept: REHABILITATION | Facility: HOSPITAL | Age: 28
End: 2018-11-07
Payer: MEDICAID

## 2018-11-07 ENCOUNTER — DOCUMENTATION ONLY (OUTPATIENT)
Dept: REHABILITATION | Facility: HOSPITAL | Age: 28
End: 2018-11-07

## 2018-11-07 DIAGNOSIS — R29.898 WEAKNESS OF RIGHT HAND: ICD-10-CM

## 2018-11-07 DIAGNOSIS — M25.531 PAIN IN RIGHT WRIST: ICD-10-CM

## 2018-11-07 PROCEDURE — 97530 THERAPEUTIC ACTIVITIES: CPT | Mod: PN

## 2018-11-07 NOTE — PROGRESS NOTES
OCCUPATIONAL THERAPY RETURN TO DOCTOR PROGRESS NOTE            Patient: Melani Sloan  MRN: [id number]  Date of Evaluation: 10/08/2018  Referring Physician:  Dr. Bairon Jay MD visit: 11/08/2018  Primary Diagnosis: Right CTR s/p 10 weeks  DOS:08/31/2018  Certification Period:  10/08/2018 to 12/08/2018  Precautions:  Vagal nerve stimulator, left chest/neck        TOTAL VISITS:    8    Melani has been treated 2/wk x 4 weeks. She continues with pain in the wrist and hand that she reports is a 6-7/10.     We have been using US to the scar, paraffin wax for heat modalities    She has yellow soft resistance putty at home for HEP    We have tried to progress past a 1# weight for wrist PRE but pt continues to report too much pain with 2# weight.    AROM is WNL, she demonstrates inconsistency with  and pinch measurements.     Objective:     AROM is WNL     Strength (Dynamometer) and Pinch Strength (Pinch Gauge)  Measured in pounds.    10/8/2018 10/8/2018 10/22/2018 10/29/2018 11/07/2018     Left Right * Right Right Right   Rung II 45 23 20 (-3) 27 (+7) 25 (-2)   Scott Pinch 11 10 7 (-3) 9 (+2) 9 (=)   3pt Pinch 14 10 8 (-2) 10 (+2) 9 (-1)   2pt Pinch 8 8 9 (+1) 9 (=) 7 (-2)            Lilliana Riley, OTD, OTR/L, CHT   Occupational therapist, Certified Hand Therapist  [facility]  [phone] phone  [phone] fax

## 2018-11-07 NOTE — PROGRESS NOTES
"  Occupational Therapy Daily Treatment Note      Date: 11/7/2018  Name: Melani Sloan  Clinic Number: [id number]     Medical Diagnosis: Right Carpal tunnel s/p Release  Date of Surgery: 08/31/2018  Surgical Procedure: CTR  Therapy Diagnosis:        Encounter Diagnosis   Name Primary?    Pain in right wrist        Precautions: vagal nerve stimulator- Left neck/Left chest     Physician: Idalmis Waddell MD  Physician Orders: eval and treat- ROM/ strengthening  Date of Return to MD: 11/08/2018     Evaluation Date: 10/8/2018  Plan of Care Certification Period: 10/08/2018-12/08/2018     Visit #:/ Visits authorized: 8/ 16  Insurance Authorization period Expiration: 12/08/2018     Time In:1045AM  Time Out: 1142AM  Total Billable Time: 42 minutes   Charges for this Visit: TA x 3    Subjective     Pt reports: " I would like to do the wax today- I'm having a lot of pain- mostly in the thumb area"    she was compliant with home exercise program given last session.   Response to previous treatment:pain  Functional change: none    Pain: 6-7/10  Location: right thumb along thenar side of incision, and in hypothenar as well    Objective *= involved side      Observation/Appearance: well healed incision- dense scar tissue present     Edema. Measured in centimeters.    10/8/2018 10/8/2018 10/29/2018     Left Right * Right*   Wrist Crease 15.5 15.5 15.0 (-0.5)   MCPs 18.0 18.5             Elbow and Wrist ROM. Measured in degrees.    10/8/2018 10/8/2018 10/22/2018 10/29/2018 11/07/2018     Left Right * Right Right Right     AROM AROM AROM AROM AROM   Wrist Ext/Flex 65/ 90 45/45 65/75 (+20/+30) 65/65 (=/-10) 65/70 (=/+5)   Wrist RD/UD 30/40 25/33 25/30 (=/-3) 25/30 (=/=) 15/25      Hand ROM. Measured in degrees.    10/8/2018 10/8/2018     Left Right*     AROM AROM          Opposition DPC DPC but tight/ uncomfortable       Strength (Dynamometer) and Pinch Strength (Pinch Gauge)  Measured in pounds.    10/8/2018 10/8/2018 10/22/2018 " 10/29/2018 11/07/2018     Left Right * Right Right Right   Rung II 45 23 20 (-3) 27 (+7) 25 (-2)   Scott Pinch 11 10 7 (-3) 9 (+2) 9 (=)   3pt Pinch 14 10 8 (-2) 10 (+2) 9 (-1)   2pt Pinch 8 8 9 (+1) 9 (=) 7 (-2)      Sensation: distal volar fingertips, out of the blue if her hand is in dependent position too long        CMS Impairment/Limitation/Restriction for FOTO Survey  Therapist reviewed FOTO scores for Melani Sloan.  FOTO documents entered into [employer] - see Media section.     Intake Limitation Score: 49% - 10/8/2018  5th visit limitation score: 55%-10/29/2018        Melani received the following supervised modalities after being cleared for contradictions for 15 minutes:   -Patient received paraffin bath  With MHP to right hand(s)  to increase blood flow, circulation, pain management and for tissue elasticity prior to therex.     -Patient received cold pack x 5 minutes to decrease pain/inflammation and edema following treatment session.          Melani received the following direct contact modalities after being cleared for contraindications for 8 minutes:  -NOT TODAY: Patient received ultrasound to  Carpal tunnel incision and thenar/hypothenar area to increase blood flow, circulation, tissue elasticity, pain management and for wound/scar management for 8 minutes @ 3 Mhz, Intensity 0.8 w/cm2 at 20% duty cycle.        Melani received the following manual therapy techniques for 5minutes:   -Performed retrograde massage to decrease edema, improve joint mobility, decrease pain and improve lymphatic drainage to increase hand function.   -Performed scar massage to CTR incision area to decrease adhesions and improve tensile glide.         Melani received therapeutic exercises for 37   minutes including:  -  AROM   Wrist flex/ext  Wrist RD/UD X 10 reps each   AROM - wave, hook, straight fist, composite fist, lifts, spreads, pinky slides X 10 reps each   Prayer stretch 10 sec x 3   Wrist PRE X1# x 3/10 x 3 ways  X  2 # x 10 wrist ext and then switched   Yellow putty X 3 min twirling  X 10 squeezes  X 10 lumbrical  squeeze  X 5 donut extensions-   X 3 logs each: lateral pinch, 3 pt    Rice gross grasp resisted X 3 min   isospheres 2 min   Red flex bar X 2/10 frown/smiles   FM task and pinch strengthening with blue-  CP clothespin and medium pompoms 1 container   Blue foam add/ yellow RB abd  X 20 each           Melani participated in dynamic functional therapeutic activities to improve functional performance for 0  minutes, including:  -none today      Home Exercises and Education Provided     Education provided: use of golf ball for scar massage- provided golf ball for use at home. Encouraged pt to continue attempting typical daily use to increase endurance and strength. Pt. demo'd understanding.   - Progress towards goals     Written Home Exercises Provided: Patient instructed to cont prior HEP. Additional written HEP for theraputty- provided yellow low resistance putty for home use  Exercises were reviewed and Melani was able to demonstrate them prior to the end of the session.  Melani demonstrated good  understanding of the education provided.   .   See EMR under Patient Instructions for exercises provided prior visit.     Assessment     Melani Sloan is a 28 y.o. female referred to outpatient occupational/hand therapy and presents with a medical diagnosis of Right CTR s/p 9.5 weeks. Pt. Reports more pain today in the thenar area of the hand. She states she was in a lot of pain yesterday. She has some more dogs to bathe at work tomorrow she reports. She reports pain at in the morning. She reports it is in the wrist where she has pain.  She says the golf ball is helpful.  Continued educating pt that soreness is to be expected with return to use and importance of continuing to resume normal activities to regain strength.   Pt has been slow to progress with strengthening and ex thus far but seems to be improving. Pt.  Reports she is in a lot of pain today- she attempted to use 2# weight for wrist PRE and was able to do 1 set of 10 wrist ext with 2# and then had to switch to 1#.        Melani is progressing fairly well towards her goals and there are no updates to goals at this time. Pt prognosis continues as Good. Pt will continue to benefit from skilled outpatient occupational therapy to address the deficits listed in the problem list on initial evaluation, provide pt/family education and to maximize pt's level of independence in the home and community environment.     Anticipated barriers to continued occupational therapy: none    Pt's spiritual, cultural and educational needs considered and pt agreeable to plan of care and goals.    Goals     The following goals were discussed with the patient and patient is in agreement with them as to be addressed in the treatment plan.   Long Term Goals (LTGs); to be met by discharge.  LTG #1: Pt will report a pain level of 2 out of 10 with ADLs     LTG #2: Pt will demo improved FOTO score by 20 points.   LTG #3: Pt will return to prior level of function for ADLs and household management.      Short Term Goals (STGs); to be met within 4 weeks (11/05/2018).  STG #1a: Pt will report 4 out of 10 pain level with ADLs. NOT MET  STG #2a: Pt will report/demo [place] with carrying >10# to lift animals for work ADLs. NOT MET  STG #2b: Pt will report/demo [place] with work ADLS per report NOT MET  STG #3a: Pt will demonstrate independence with issued HEP. MET  STG #3b: Pt will demo improved  strength of right hand by 10#  needed to aid with work tasks. NOT MET    Plan     Pt. [place] tomorrow AM. Await outcome of RTD visit    Continue skilled occupational therapy with individualized plan of care 2x/week for 8 weeks from 10/08/2018 to 12/07/2018.    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

## 2018-11-09 DIAGNOSIS — G56.01 CARPAL TUNNEL SYNDROME ON RIGHT: Primary | ICD-10-CM

## 2018-11-12 ENCOUNTER — CLINICAL SUPPORT (OUTPATIENT)
Dept: REHABILITATION | Facility: HOSPITAL | Age: 28
End: 2018-11-12
Payer: MEDICAID

## 2018-11-12 DIAGNOSIS — M25.531 PAIN IN RIGHT WRIST: ICD-10-CM

## 2018-11-12 DIAGNOSIS — R29.898 WEAKNESS OF RIGHT HAND: ICD-10-CM

## 2018-11-12 PROCEDURE — 97530 THERAPEUTIC ACTIVITIES: CPT | Mod: PN

## 2018-11-12 NOTE — PROGRESS NOTES
"  Occupational Therapy Daily Treatment Note      Date: 11/12/2018  Name: Melani Sloan  Clinic Number: [id number]     Medical Diagnosis: Right Carpal tunnel s/p Release  Date of Surgery: 08/31/2018  Surgical Procedure: CTR  Therapy Diagnosis:        Encounter Diagnosis   Name Primary?    Pain in right wrist        Precautions: vagal nerve stimulator- Left neck/Left chest     Physician: Idalmis Waddell MD  Physician Orders: eval and treat- ROM/ strengthening  Date of Return to MD: 11/08/2018     Evaluation Date: 10/8/2018  Plan of Care Certification Period: 10/08/2018-12/08/2018     Visit #:/ Visits authorized: 9/ 16  Insurance Authorization period Expiration: 12/08/2018     Time In:10AM  Time Out: 11AM  Total Billable Time: 40 minutes   Charges for this Visit: TA x 3    Subjective     Pt reports: " I'm having pain today, the doctor said the pain could last a few months and she wants me to do strengthening"   she was compliant with home exercise program given last session.   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 Cannot lift dog onto table.     Pain: 5-6/10  Location: right thumb along thenar side of incision, and in hypothenar as well    Objective *= involved side      Observation/Appearance: well healed incision- dense scar tissue present     Edema. Measured in centimeters.    10/8/2018 10/8/2018 10/29/2018     Left Right * Right*   Wrist Crease 15.5 15.5 15.0 (-0.5)   MCPs 18.0 18.5             Elbow and Wrist ROM. Measured in degrees.    10/8/2018 10/8/2018 10/22/2018 10/29/2018 11/07/2018     Left Right * Right Right Right     AROM AROM AROM AROM AROM   Wrist Ext/Flex 65/ 90 45/45 65/75 (+20/+30) 65/65 (=/-10) 65/70 (=/+5)   Wrist RD/UD 30/40 25/33 25/30 (=/-3) 25/30 (=/=) 15/25      Hand ROM. Measured in degrees.    10/8/2018 10/8/2018     Left Right*     AROM AROM          Opposition DPC DPC but tight/ uncomfortable       " Strength (Dynamometer) and Pinch Strength (Pinch Gauge)  Measured in pounds.    10/8/2018 10/8/2018 10/22/2018 10/29/2018 11/07/2018     Left Right * Right Right Right   Rung II 45 23 20 (-3) 27 (+7) 25 (-2)   Scott Pinch 11 10 7 (-3) 9 (+2) 9 (=)   3pt Pinch 14 10 8 (-2) 10 (+2) 9 (-1)   2pt Pinch 8 8 9 (+1) 9 (=) 7 (-2)      Sensation: distal volar fingertips, out of the blue if her hand is in dependent position too long        CMS Impairment/Limitation/Restriction for FOTO Survey  Therapist reviewed FOTO scores for Melani Sloan.  FOTO documents entered into "[employer] - see Media section.     Intake Limitation Score: 49% - 10/8/2018  5th visit limitation score: 55%-10/29/2018        Melani received the following supervised modalities after being cleared for contradictions for 20 minutes:   -Patient received paraffin bath  With MHP to right hand(s)  to increase blood flow, circulation, pain management and for tissue elasticity prior to therex.     -Patient received cold pack x 5 minutes to decrease pain/inflammation and edema following treatment session.          Melani received the following direct contact modalities after being cleared for contraindications for 0 minutes:  -NOT TODAY: Patient received ultrasound to  Carpal tunnel incision and thenar/hypothenar area to increase blood flow, circulation, tissue elasticity, pain management and for wound/scar management for 8 minutes @ 3 Mhz, Intensity 0.8 w/cm2 at 20% duty cycle.        Melani received the following manual therapy techniques for 5minutes:   -Performed retrograde massage to decrease edema, improve joint mobility, decrease pain and improve lymphatic drainage to increase hand function.   -Performed scar massage to CTR incision area to decrease adhesions and improve tensile glide.         Melani received therapeutic exercises for 35  minutes including:  -  AROM   Wrist flex/ext  Wrist RD/UD X 10 reps each   AROM - wave, hook, straight fist, composite  fist, lifts, spreads, pinky slides X 10 reps each   Prayer stretch 10 sec x 3   Elbow PRE X 2# x 3/10 elbow flexion   Wrist PRE X1# x 2/10 x 3 ways  X 2 # x 1/10 x 3 ways   Yellow putty @ home X 3 min twirling  X 10 squeezes  X 10 lumbrical  squeeze  X 5 donut extensions-   X 3 logs each: lateral pinch, 3 pt    Resistive hand gripper (gold rung 3) X 29# x 2/10   isospheres 2 min   Red flex bar X 2/10 frown/smiles   FM task and pinch strengthening with blue-  CP clothespin and medium pompoms 1 container   Blue foam add/ yellow RB abd  X 20 each   Wall push ups Able to do 12/15 push ups           Melani participated in dynamic functional therapeutic activities to improve functional performance for 0  minutes, including:  -none today      Home Exercises and Education Provided     Education provided: use of golf ball for scar massage- provided golf ball for use at home. Encouraged pt to continue attempting typical daily use to increase endurance and strength. Pt. demo'd understanding. Cont theraputty strengthening and AROM HEP as instructed  - Progress towards goals     Written Home Exercises Provided: yes. Added elbow, wrist PRE to HEP, along with wall push ups.   Exercises were reviewed and Melani was able to demonstrate them prior to the end of the session.  Melani demonstrated good  understanding of the education provided.   .   See EMR under Patient Instructions for exercises provided 11/12/2018.     Assessment     Melani Sloan is a 28 y.o. female referred to outpatient occupational/hand therapy and presents with a medical diagnosis of Right CTR s/p 10 weeks. She reports she saw Dr. Waddell and Dr. Waddell said to expect pain for a few months. She reports she is slowly returning to work, she reports difficulty picking the dogs up to put on the grooming tables and having to take breaks from using clippers. She has some shooting pains in the wrist when pickup up things underhanded with palm up.   Continued  educating pt that soreness is to be expected with return to use and importance of continuing to resume normal activities to regain strength.   Pt has been slow to progress with strengthening and ex thus far but seems to be improving. Educated pt that if MD wants more strengthening then we need to continue to try to progress with strengthening. Pt verbalized understanding. Added elbow PRE and wall push ups with good participation, pain in thenar area with wall push ups and only able to do 12/15. Able to use 2# weight for 1 set of all wrist strengthening this date. Added  strengthening with fatigue noted at end of 2nd set .      Melani is progressing fairly well towards her goals and there are no updates to goals at this time. Pt prognosis continues as Good. Pt will continue to benefit from skilled outpatient occupational therapy to address the deficits listed in the problem list on initial evaluation, provide pt/family education and to maximize pt's level of independence in the home and community environment.     Anticipated barriers to continued occupational therapy: none    Pt's spiritual, cultural and educational needs considered and pt agreeable to plan of care and goals.    Goals     The following goals were discussed with the patient and patient is in agreement with them as to be addressed in the treatment plan.   Long Term Goals (LTGs); to be met by discharge.  LTG #1: Pt will report a pain level of 2 out of 10 with ADLs     LTG #2: Pt will demo improved FOTO score by 20 points.   LTG #3: Pt will return to prior level of function for ADLs and household management.      Short Term Goals (STGs); to be met within 4 weeks (11/05/2018).  STG #1a: Pt will report 4 out of 10 pain level with ADLs. NOT MET  STG #2a: Pt will report/demo [place] with carrying >10# to lift animals for work ADLs. NOT MET  STG #2b: Pt will report/demo [place] with work ADLS per report NOT MET  STG #3a: Pt will demonstrate  independence with issued HEP. MET  STG #3b: Pt will demo improved  strength of right hand by 10#  needed to aid with work tasks. NOT MET    Plan     MD sent new orders for cont therapy, strengthening  Continue skilled occupational therapy with individualized plan of care 2x/week for 8 weeks from 10/08/2018 to 12/07/2018.    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 - Upgrade to peach putty next session.

## 2018-11-12 NOTE — PATIENT INSTRUCTIONS
[facility], OCCUPATIONAL THERAPY  HOME EXERCISE PROGRAM     Complete the following strengthening exercises using a 2 pound weight.  Do 3 sets of 10  repetitions, 1x/day.     Resisted Elbow Flexion  With arm straight, holding weight, bend elbow then straighten.       ELBOW: Wall Push-Up        With arms slightly wider than shoulders, gently lean body to wall. Push body away from wall by straightening elbows.   Complete 2 sets of 15 repetitions    Copyright © [facility]. All rights reserved.   [facility]  OCCUPATIONAL THERAPY  HOME EXERCISE PROGRAM     Complete the following strengthening exercises using 1-2 pound weight.  Do 3 sets of 10 repetitions of each, 1x per day.       Resisted Wrist Flexion  With palm up and weight in hand, bend wrist up. Return slowly.      Resisted Wrist Extension  With palm down and weight in hand, bend wrist up. Then bend wrist down.      Resisted Wrist Deviation  With thumb up and weight in hand, bend wrist up. Return slowly.    Copyright © [facility]. All rights reserved.     Therapist: Lilliana Riley, SID, OTR/L, CHT   Occupational therapist, Certified Hand Therapist

## 2018-11-14 ENCOUNTER — CLINICAL SUPPORT (OUTPATIENT)
Dept: REHABILITATION | Facility: HOSPITAL | Age: 28
End: 2018-11-14
Payer: MEDICAID

## 2018-11-14 DIAGNOSIS — R29.898 WEAKNESS OF RIGHT HAND: ICD-10-CM

## 2018-11-14 DIAGNOSIS — M25.531 PAIN IN RIGHT WRIST: ICD-10-CM

## 2018-11-14 PROCEDURE — 97530 THERAPEUTIC ACTIVITIES: CPT | Mod: PN

## 2018-11-14 NOTE — PROGRESS NOTES
"  Occupational Therapy Daily Treatment Note      Date: 11/14/2018  Name: Melani Sloan  Clinic Number: [id number]     Medical Diagnosis: Right Carpal tunnel s/p Release  Date of Surgery: 08/31/2018  Surgical Procedure: CTR  Therapy Diagnosis:        Encounter Diagnosis   Name Primary?    Pain in right wrist        Precautions: vagal nerve stimulator- Left neck/Left chest     Physician: Idalmis Waddell MD  Physician Orders: eval and treat- ROM/ strengthening  Date of Return to MD: 11/08/2018     Evaluation Date: 10/8/2018  Plan of Care Certification Period: 10/08/2018-12/08/2018     Visit #:/ Visits authorized: 10/ 16  Insurance Authorization period Expiration: 12/08/2018     Time In:10:50 AM  Time Out: 11:45 AM  Total Billable Time: 40 minutes   Charges for this Visit: TA x 3    Subjective     Pt reports: " I can't really feel my hand today. The whole hand is numb. I woke up in the middle of the night and it was like that."  Pt unsure if due to cold weather. "I don't know if I have pain. I don't feel it"  she was compliant with home exercise program given last session.   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 Cannot lift dog onto table.     Pain: unable to rate  Location: right thumb along thenar side of incision, and in hypothenar as well    Objective *= involved side      Observation/Appearance: well healed incision- dense scar tissue present     Edema. Measured in centimeters.    10/8/2018 10/8/2018 10/29/2018     Left Right * Right*   Wrist Crease 15.5 15.5 15.0 (-0.5)   MCPs 18.0 18.5             Elbow and Wrist ROM. Measured in degrees.    10/8/2018 10/8/2018 10/22/2018 10/29/2018 11/07/2018     Left Right * Right Right Right     AROM AROM AROM AROM AROM   Wrist Ext/Flex 65/ 90 45/45 65/75 (+20/+30) 65/65 (=/-10) 65/70 (=/+5)   Wrist RD/UD 30/40 25/33 25/30 (=/-3) 25/30 (=/=) 15/25      Hand ROM. Measured in " degrees.    10/8/2018 10/8/2018     Left Right*     AROM AROM          Opposition DPC DPC but tight/ uncomfortable       Strength (Dynamometer) and Pinch Strength (Pinch Gauge)  Measured in pounds.    10/8/2018 10/8/2018 10/22/2018 10/29/2018 11/07/2018     Left Right * Right Right Right   Rung II 45 23 20 (-3) 27 (+7) 25 (-2)   Scott Pinch 11 10 7 (-3) 9 (+2) 9 (=)   3pt Pinch 14 10 8 (-2) 10 (+2) 9 (-1)   2pt Pinch 8 8 9 (+1) 9 (=) 7 (-2)      Sensation: distal volar fingertips, out of the blue if her hand is in dependent position too long        CMS Impairment/Limitation/Restriction for FOTO Survey  Therapist reviewed FOTO scores for Melani Sloan.  FOTO documents entered into [employer] - see Media section.     Intake Limitation Score: 49% - 10/8/2018  5th visit limitation score: 55%-10/29/2018  10th visit limitation score: 46% - 11/14/18        Melani received the following supervised modalities after being cleared for contradictions for 15 minutes:   -Patient received paraffin bath  With MHP to right hand(s)  to increase blood flow, circulation, pain management and for tissue elasticity prior to therex.     -Patient received cold pack x 5 minutes to decrease pain/inflammation and edema following treatment session.          Melani received the following direct contact modalities after being cleared for contraindications for 0 minutes:  -NOT TODAY: Patient received ultrasound to  Carpal tunnel incision and thenar/hypothenar area to increase blood flow, circulation, tissue elasticity, pain management and for wound/scar management for 8 minutes @ 3 Mhz, Intensity 0.8 w/cm2 at 20% duty cycle.        Melani received the following manual therapy techniques for 5minutes:   -Performed retrograde massage to decrease edema, improve joint mobility, decrease pain and improve lymphatic drainage to increase hand function.   -Performed scar massage to CTR incision area to decrease adhesions and improve tensile glide.          Melani received therapeutic exercises for 35  minutes including:  -  AROM   Wrist flex/ext  Wrist RD/UD X 10 reps each   AROM - wave, hook, straight fist, composite fist, lifts, spreads, pinky slides X 10 reps each   Prayer stretch 10 sec x 3   Elbow PRE X 2# x 3/10 elbow flexion   Wrist PRE X1# x 2/10 x 3 ways  X 2 # x 1/10 x 3 ways   Upgraded to peach putty @ home X 3 min twirling  X 10 squeezes  X 10 lumbrical  squeeze  X 5 donut extensions-   X 3 logs each: lateral pinch, 3 pt    Resistive hand gripper (gold rung 3) X 29# x 2/10 (1 set of 10 on rung 3, then 1 set of 10 on rung 2 today)   isospheres 2 min   Red flex bar X 2/10 frown/smiles   FM task and pinch strengthening with blue-  CP clothespin and medium pompoms 1 container   Blue foam add/ yellow RB abd  X 20 each   Wall push ups Able to do 12/15 push ups           Melani participated in dynamic functional therapeutic activities to improve functional performance for 0  minutes, including:  -none today      Home Exercises and Education Provided     Education provided:  Encouraged pt to continue attempting typical daily use to increase endurance and strength. Pt. demo'd understanding. Cont theraputty strengthening and AROM HEP as instructed  - Progress towards goals     Written Home Exercises Provided: yes. Pt given some red putty to add to yellow at home  Exercises were reviewed and Melani was able to demonstrate them prior to the end of the session.  Melani demonstrated good  understanding of the education provided.   .   See EMR under Patient Instructions for exercises provided 11/12/2018.     Assessment     Melani Sloan is a 28 y.o. female referred to outpatient occupational/hand therapy and presents with a medical diagnosis of Right CTR s/p 10 weeks.She reports she is slowly returning to work. Pt reports she cont to have some difficulty with using the clippers at work and requires rest breaks.  She has some shooting pains in the wrist  when pickup up things underhanded with palm up.  Today pt c/o numbness throughout entire hand, which began last night. However, pt wanted to cont with therapy. Tx performed as tolerated. Pt demonstrated decreased ability to extend and flex wrist during ex, unsure of effort being exerted during some ex.  Pt reported the 2# weight for wrist PRE was too heavy. However, pt performed red flexbar ex without difficulty or reports of pain.  Continued educating pt that soreness is to be expected with return to use and importance of continuing to resume normal activities to regain strength.   Pt has been slow to progress with strengthening and ex thus far but seems to be improving. Educated pt that if MD wants more strengthening then we need to continue to try to progress with strengthening. Pt verbalized understanding. Pt only able to perform 6/15 wall push ups this date.  Pt tolerated light progression of putty ex. Pt given some red putty to add to the yellow at home.      Melani is progressing fairly well towards her goals and there are no updates to goals at this time. Pt prognosis continues as Good. Pt will continue to benefit from skilled outpatient occupational therapy to address the deficits listed in the problem list on initial evaluation, provide pt/family education and to maximize pt's level of independence in the home and community environment.     Anticipated barriers to continued occupational therapy: none    Pt's spiritual, cultural and educational needs considered and pt agreeable to plan of care and goals.    Goals     The following goals were discussed with the patient and patient is in agreement with them as to be addressed in the treatment plan.   Long Term Goals (LTGs); to be met by discharge.  LTG #1: Pt will report a pain level of 2 out of 10 with ADLs     LTG #2: Pt will demo improved FOTO score by 20 points.   LTG #3: Pt will return to prior level of function for ADLs and household management.       Short Term Goals (STGs); to be met within 4 weeks (11/05/2018).  STG #1a: Pt will report 4 out of 10 pain level with ADLs. NOT MET  STG #2a: Pt will report/demo [place] with carrying >10# to lift animals for work ADLs. NOT MET  STG #2b: Pt will report/demo [place] with work ADLS per report NOT MET  STG #3a: Pt will demonstrate independence with issued HEP. MET  STG #3b: Pt will demo improved  strength of right hand by 10#  needed to aid with work tasks. NOT MET    Plan     MD sent new orders for cont therapy, strengthening  Continue skilled occupational therapy with individualized plan of care 2x/week for 8 weeks from 10/08/2018 to 12/07/2018.    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     PAPITO Anders

## 2018-11-19 ENCOUNTER — CLINICAL SUPPORT (OUTPATIENT)
Dept: REHABILITATION | Facility: HOSPITAL | Age: 28
End: 2018-11-19
Payer: MEDICAID

## 2018-11-19 DIAGNOSIS — M25.531 PAIN IN RIGHT WRIST: ICD-10-CM

## 2018-11-19 DIAGNOSIS — R29.898 WEAKNESS OF RIGHT HAND: ICD-10-CM

## 2018-11-19 PROCEDURE — 97530 THERAPEUTIC ACTIVITIES: CPT | Mod: PN

## 2018-11-19 NOTE — PROGRESS NOTES
"  Occupational Therapy Daily Treatment Note      Date: 11/19/2018  Name: Melani Sloan  Clinic Number: [id number]     Medical Diagnosis: Right Carpal tunnel s/p Release  Date of Surgery: 08/31/2018  Surgical Procedure: CTR  Therapy Diagnosis:        Encounter Diagnosis   Name Primary?    Pain in right wrist        Precautions: vagal nerve stimulator- Left neck/Left chest     Physician: Idalmis Waddell MD  Physician Orders: eval and treat- ROM/ strengthening  Date of Return to MD: Jan 2019     Evaluation Date: 10/8/2018  Plan of Care Certification Period: 10/08/2018-12/08/2018     Visit #:/ Visits authorized: 11/ 16  Insurance Authorization period Expiration: 12/08/2018     Time In:1000AM  Time Out: 11AM  Total Billable Time: 40 minutes   Charges for this Visit: TA x 3    Subjective     Pt reports: " I did the putty and it made me hand red- pt with picture of spot over dorsal thumb p1 with redness. Pt. States it did not happen with the yellow putty.  Pt. States " it used to turn purple before the sx because I don't have that cushion over the top like I do on the otherside" Pt. Without observable difference in soft tissue over R vs L thumb.   she was compliant with home exercise program given last session.   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 Cannot lift dog onto table.     Pain: unable to rate  Location: right thumb along thenar side of incision, and in hypothenar as well    Objective *= involved side      Observation/Appearance: well healed incision- dense scar tissue present     Elbow and Wrist ROM.WNL       Strength (Dynamometer) and Pinch Strength (Pinch Gauge)  Measured in pounds.    10/8/2018 10/8/2018 10/22/2018 10/29/2018 11/07/2018 11/19/2018     Left Right * Right Right Right Right   Rung II 45 23 20 (-3) 27 (+7) 25 (-2) 35 (+10)   Scott Pinch 11 10 7 (-3) 9 (+2) 9 (=) 10 (+1)   3pt Pinch 14 10 8 (-2) 10 (+2) " "9 (-1) 10 (+1)   2pt Pinch 8 8 9 (+1) 9 (=) 7 (-2) 8 (+1)      Sensation: distal volar fingertips, out of the blue if her hand is in dependent position too long        CMS Impairment/Limitation/Restriction for FOTO Survey  Therapist reviewed FOTO scores for Melani Sloan.  FOTO documents entered into [employer] - see Media section.   * 11/19/2018:  pt stating " I just answer the questions however because the questions are complicated" Previous therapist states she went over all questions with pt at 10th visit and explained all questions.   Intake Limitation Score: 49% - 10/8/2018  5th visit limitation score: 55%-10/29/2018  10th visit limitation score: 46% - 11/14/18        Melani received the following supervised modalities after being cleared for contradictions for 15 minutes:   -Patient received paraffin bath  With MHP to right hand(s)  to increase blood flow, circulation, pain management and for tissue elasticity prior to therex.     -Patient received cold pack x 5 minutes to decrease pain/inflammation and edema following treatment session.          Melani received the following direct contact modalities after being cleared for contraindications for 0 minutes:  -NOT TODAY: Patient received ultrasound to  Carpal tunnel incision and thenar/hypothenar area to increase blood flow, circulation, tissue elasticity, pain management and for wound/scar management for 8 minutes @ 3 Mhz, Intensity 0.8 w/cm2 at 20% duty cycle.        Melani received the following manual therapy techniques for 5minutes:   -Performed retrograde massage to decrease edema, improve joint mobility, decrease pain and improve lymphatic drainage to increase hand function.   -Performed scar massage to CTR incision area to decrease adhesions and improve tensile glide.         Melani received therapeutic exercises for 35  minutes including:  -  AROM   Wrist flex/ext  Wrist RD/UD X 10 reps each   AROM - wave, hook, straight fist, composite fist, lifts, " spreads, pinky slides X 10 reps each   Prayer stretch 10 sec x 3   Elbow PRE X 2# x 3/10 elbow flexion   Wrist PRE X2# x 2/10 x 3 ways  X 1 # x 1/10 x 3 ways   Downgraded to yellow per pt reporting adverse rxn to skin  @ home X 3 min twirling  X 10 squeezes  X 10 lumbrical  squeeze  X 5 donut extensions-   X 3 logs each: lateral pinch, 3 pt    Resistive hand gripper (gold rung 3) X 29# x 1/15  X Rung 2: 1/ 15   isospheres 2 min   Red flex bar X 2/10 frown/smiles   FM task and pinch strengthening with blue-  CP clothespin and medium pompoms 1 container   Blue foam add/ yellow RB abd  X 20 each   Wall push ups Able to do 10/15 push ups           Melani participated in dynamic functional therapeutic activities to improve functional performance for 0  minutes, including:  -none today      Home Exercises and Education Provided     Education provided:  Encouraged pt to continue attempting typical daily use to increase endurance and strength. Pt. demo'd understanding. Cont theraputty strengthening with yellow only putty (provided today)  and AROM HEP as instructed  - Progress towards goals     Written Home Exercises Provided: Patient instructed to cont prior HEP with yellow putty.  Exercises were reviewed and Melani was able to demonstrate them prior to the end of the session.  Melani demonstrated good  understanding of the education provided.   .   See EMR under Patient Instructions for exercises provided 11/12/2018.     Assessment     Melani Sloan is a 28 y.o. female referred to outpatient occupational/hand therapy and presents with a medical diagnosis of Right CTR s/p 11 weeks.She reports she is slowly returning to work. Pt reports she cont to have some difficulty with using the clippers at work and requires rest breaks. She reports she does not have the numbness today. aROM is WNL.   Strength measurements taken and pt with decreased effort upon  measurement- achieving only 20 # of measured  on avg  of 3 trials, but then upon additional trial of 3 measurements pt able to achieve 15 more pounds of  strength. Pinch strength all with 1# increases.  Pt. Observed to be independent at holding cell phone in right hand to manipulate/ utilize while in waiting room before treatment session.  Pt reported the 2# weight for wrist PRE was too heavy. However, pt performed red flexbar ex without difficulty or reports of pain.  Continued educating pt that soreness is to be expected with return to use and importance of continuing to resume normal activities to regain strength.   Pt has been slow to progress with strengthening and ex thus far but seems to be improving. Pt only able to perform 10/15 wall push ups this date, observed to be able to weightbear greater in right than left arm during task. Pt. With area of redness that therapist has previously noted, pt reports it was like that before sx but attributes to red putty as she did not have this with the yellow putty.     Melani is progressing fairly well towards her goals and there are no updates to goals at this time. Pt prognosis continues as Good. Pt will continue to benefit from skilled outpatient occupational therapy to address the deficits listed in the problem list on initial evaluation, provide pt/family education and to maximize pt's level of independence in the home and community environment.     Anticipated barriers to continued occupational therapy: none    Pt's spiritual, cultural and educational needs considered and pt agreeable to plan of care and goals.    Goals     The following goals were discussed with the patient and patient is in agreement with them as to be addressed in the treatment plan.   Long Term Goals (LTGs); to be met by discharge.  LTG #1: Pt will report a pain level of 2 out of 10 with ADLs     LTG #2: Pt will demo improved FOTO score by 20 points.   LTG #3: Pt will return to prior level of function for ADLs and household management.       Short Term Goals (STGs); to be met within 4 weeks (11/05/2018).  STG #1a: Pt will report 4 out of 10 pain level with ADLs. NOT MET  STG #2a: Pt will report/demo [place] with carrying >10# to lift animals for work ADLs. NOT MET  STG #2b: Pt will report/demo [place] with work ADLS per report NOT MET  STG #3a: Pt will demonstrate independence with issued HEP. MET  STG #3b: Pt will demo improved  strength of right hand by 10#  needed to aid with work tasks. NOT MET    Plan     MD sent new orders for cont therapy, strengthening  Continue skilled occupational therapy with individualized plan of care 2x/week for 8 weeks from 10/08/2018 to 12/07/2018.    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     Lilliana Riley, OTD, OTR/L, CHT   Occupational therapist, Certified Hand Therapist

## 2018-11-21 ENCOUNTER — CLINICAL SUPPORT (OUTPATIENT)
Dept: REHABILITATION | Facility: HOSPITAL | Age: 28
End: 2018-11-21
Payer: MEDICAID

## 2018-11-21 DIAGNOSIS — M25.531 PAIN IN RIGHT WRIST: ICD-10-CM

## 2018-11-21 DIAGNOSIS — R29.898 WEAKNESS OF RIGHT HAND: ICD-10-CM

## 2018-11-21 PROCEDURE — 97530 THERAPEUTIC ACTIVITIES: CPT | Mod: PN

## 2018-11-21 NOTE — PROGRESS NOTES
Occupational Therapy Daily Treatment Note      Date: 11/21/2018  Name: Melani Sloan  Clinic Number: [id number]     Medical Diagnosis: Right Carpal tunnel s/p Release  Date of Surgery: 08/31/2018  Surgical Procedure: CTR  Therapy Diagnosis:        Encounter Diagnosis   Name Primary?    Pain in right wrist        Precautions: vagal nerve stimulator- Left neck/Left chest     Physician: Idalmis Waddell MD  Physician Orders: eval and treat- ROM/ strengthening  Date of Return to MD: Jan 2019     Evaluation Date: 10/8/2018  Plan of Care Certification Period: 10/08/2018-12/08/2018     Visit #:/ Visits authorized: 12/ 16  Insurance Authorization period Expiration: 12/08/2018     Time In:10AM  Time Out: 1050AM  Total Billable Time: 40 minutes   Charges for this Visit: TA x 3    Subjective     Pt reports: Pt. States it is doing ok. She states she does not want the hand to hurt so she does not like pushing it.   she was compliant with home exercise program given last session.   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 Cannot lift dog onto table.     Pain: It hurts  Location: right thumb along thenar side of incision, and in hypothenar as well    Objective *= involved side      Observation/Appearance: well healed incision- dense scar tissue present     Elbow and Wrist ROM.WNL       Strength (Dynamometer) and Pinch Strength (Pinch Gauge)  Measured in pounds.    10/8/2018 10/8/2018 10/22/2018 10/29/2018 11/07/2018 11/19/2018     Left Right * Right Right Right Right   Rung II 45 23 20 (-3) 27 (+7) 25 (-2) 35 (+10)   Scott Pinch 11 10 7 (-3) 9 (+2) 9 (=) 10 (+1)   3pt Pinch 14 10 8 (-2) 10 (+2) 9 (-1) 10 (+1)   2pt Pinch 8 8 9 (+1) 9 (=) 7 (-2) 8 (+1)      Sensation: distal volar fingertips, out of the blue if her hand is in dependent position too long        CMS Impairment/Limitation/Restriction for FOTO Survey  Therapist reviewed FOTO  "scores for Melani Sloan.  FOTO documents entered into [employer] - see Media section.   * 11/19/2018:  pt stating " I just answer the questions however because the questions are complicated" Previous therapist states she went over all questions with pt at 10th visit and explained all questions.   Intake Limitation Score: 49% - 10/8/2018  5th visit limitation score: 55%-10/29/2018  10th visit limitation score: 46% - 11/14/18        Melani received the following supervised modalities after being cleared for contradictions for 15 minutes:   -Patient received fluidotherapy to right hand(s)  to increase blood flow, circulation, pain management and for tissue elasticity prior to therex.     -Patient received cold pack x 5 minutes to decrease pain/inflammation and edema following treatment session.          Melani received the following direct contact modalities after being cleared for contraindications for 0 minutes:  -     Melani received the following manual therapy techniques for 5minutes:   -Performed retrograde massage to decrease edema, improve joint mobility, decrease pain and improve lymphatic drainage to increase hand function.   -Performed scar massage to CTR incision area to decrease adhesions and improve tensile glide.         Melani received therapeutic exercises for 35  minutes including:  -  AROM   Wrist flex/ext  Wrist RD/UD X 10 reps each   AROM - wave, hook, straight fist, composite fist, lifts, spreads, pinky slides X 10 reps each   Prayer stretch 10 sec x 3   Elbow PRE X 3# x 3/10 elbow flexion   Wrist PRE X2# x 2/10 x 3 ways  X 2 # weighted ball  x 1/10 x flx/ext   Downgraded to yellow per pt reporting adverse rxn to skin  @ home X 3 min twirling  X 10 squeezes  X 10 lumbrical  squeeze  X 5 donut extensions-   X 3 logs each: lateral pinch, 3 pt    Resistive hand gripper (gold rung 3) X 29# x 1/15  X Rung 2: 1/ 15   isospheres 2 min   Red flex bar X 2/10 frown/smiles   FM task and pinch " strengthening with blue-  CP clothespin and medium pompoms 1 container   Blue foam add/ yellow RB abd  X 20 each   Wall push ups Able to do 10/15 push ups- used blue foam under right palm           Melani participated in dynamic functional therapeutic activities to improve functional performance for 0  minutes, including:  -none today      Home Exercises and Education Provided     Education provided:  Encouraged pt to continue attempting typical daily use to increase endurance and strength. Pt. demo'd understanding. Cont theraputty strengthening with yellow only putty  and AROM HEP as instructed  - Progress towards goals     Written Home Exercises Provided: Patient instructed to cont prior HEP with yellow putty.  Exercises were reviewed and Melani was able to demonstrate them prior to the end of the session.  Melani demonstrated good  understanding of the education provided.   .   See EMR under Patient Instructions for exercises provided 11/12/2018.     Assessment     Melani Sloan is a 28 y.o. female referred to outpatient occupational/hand therapy and presents with a medical diagnosis of Right CTR s/p 11 weeks.She reports she is slowly returning to work. Therapist again observed area of redness over dorsal thumb this date prior to start of session..  Pt reports she cont to have some difficulty with using the clippers at work and requires rest breaks. She reports she does not have the numbness today. .  Pt reported the 2# weight for wrist PRE was too heavy, however pt able to perform wrist PRE 2 sets of 10 with dumbbell type weight and 1 set of 10 (flx/xt) with 2# weighted ball without complaints.  Continued educating pt that soreness is to be expected with return to use and importance of continuing to resume normal activities to regain strength.   Pt has been slow to progress with strengthening and ex thus far but seems to be improving.     Melani is progressing fairly well towards her goals and there are no  updates to goals at this time. Pt prognosis continues as Good. Pt will continue to benefit from skilled outpatient occupational therapy to address the deficits listed in the problem list on initial evaluation, provide pt/family education and to maximize pt's level of independence in the home and community environment.     Anticipated barriers to continued occupational therapy: none    Pt's spiritual, cultural and educational needs considered and pt agreeable to plan of care and goals.    Goals     The following goals were discussed with the patient and patient is in agreement with them as to be addressed in the treatment plan.   Long Term Goals (LTGs); to be met by discharge.  LTG #1: Pt will report a pain level of 2 out of 10 with ADLs     LTG #2: Pt will demo improved FOTO score by 20 points.   LTG #3: Pt will return to prior level of function for ADLs and household management.      Short Term Goals (STGs); to be met within 4 weeks (11/05/2018).  STG #1a: Pt will report 4 out of 10 pain level with ADLs. NOT MET  STG #2a: Pt will report/demo [place] with carrying >10# to lift animals for work ADLs. NOT MET  STG #2b: Pt will report/demo [place] with work ADLS per report NOT MET  STG #3a: Pt will demonstrate independence with issued HEP. MET  STG #3b: Pt will demo improved  strength of right hand by 10#  needed to aid with work tasks. MET 11/19/2018    Plan     MD sent new orders for cont therapy, strengthening  Continue skilled occupational therapy with individualized plan of care 2x/week for 8 weeks from 10/08/2018 to 12/07/2018.    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     Lilliana Riley, SID, OTR/L, CHT   Occupational therapist, Certified Hand Therapist

## 2018-11-27 ENCOUNTER — CLINICAL SUPPORT (OUTPATIENT)
Dept: REHABILITATION | Facility: HOSPITAL | Age: 28
End: 2018-11-27
Payer: MEDICAID

## 2018-11-27 DIAGNOSIS — M25.531 PAIN IN RIGHT WRIST: ICD-10-CM

## 2018-11-27 DIAGNOSIS — R29.898 WEAKNESS OF RIGHT HAND: ICD-10-CM

## 2018-11-27 PROCEDURE — 97530 THERAPEUTIC ACTIVITIES: CPT | Mod: PN

## 2018-11-27 NOTE — PROGRESS NOTES
"  Occupational Therapy Daily Treatment Note      Date: 11/27/2018  Name: Melani Sloan  Clinic Number: [id number]     Medical Diagnosis: Right Carpal tunnel s/p Release  Date of Surgery: 08/31/2018  Surgical Procedure: CTR  Therapy Diagnosis:        Encounter Diagnosis   Name Primary?    Pain in right wrist        Precautions: vagal nerve stimulator- Left neck/Left chest     Physician: Idalmis Waddell MD  Physician Orders: eval and treat- ROM/ strengthening  Date of Return to MD: Jan 2019     Evaluation Date: 10/8/2018  Plan of Care Certification Period: 10/08/2018-12/08/2018     Visit #:/ Visits authorized: 13/ 16  Insurance Authorization period Expiration: 12/08/2018     Time In:1047AM  Time Out: 1137AM  Total Billable Time: 40 minutes   Charges for this Visit: TA x 3    Subjective     Pt reports: " I think I will have Thursday be my last day, My hand is doing a lot better"   she was compliant with home exercise program given last session.   Response to previous treatment:pain and numbness  Functional change: able to hold clippers longer, able to lift bigger dogs onto table    Pain: It hurts  Location: right thumb along thenar side of incision, and in hypothenar as well    Objective *= involved side      Observation/Appearance: well healed incision- dense scar tissue present     Elbow and Wrist ROM.WNL       Strength (Dynamometer) and Pinch Strength (Pinch Gauge)  Measured in pounds.    10/8/2018 10/8/2018 10/22/2018 10/29/2018 11/07/2018 11/19/2018      Left Right * Right Right Right Right    Rung II 45 23 20 (-3) 27 (+7) 25 (-2) 35 (+10)    Scott Pinch 11 10 7 (-3) 9 (+2) 9 (=) 10 (+1)    3pt Pinch 14 10 8 (-2) 10 (+2) 9 (-1) 10 (+1)    2pt Pinch 8 8 9 (+1) 9 (=) 7 (-2) 8 (+1)               CMS Impairment/Limitation/Restriction for FOTO Survey  Therapist reviewed FOTO scores for Melani Sloan.  FOTO documents entered into [employer] - see Media section.   * 11/19/2018:  pt stating " I just answer the questions " "however because the questions are complicated" Previous therapist states she went over all questions with pt at 10th visit and explained all questions.   Intake Limitation Score: 49% - 10/8/2018  5th visit limitation score: 55%-10/29/2018  10th visit limitation score: 46% - 11/14/18        Melani received the following supervised modalities after being cleared for contradictions for 10 minutes:   -Patient received fluidotherapy to right hand(s) for 10 min  to increase blood flow, circulation, pain management and for tissue elasticity prior to therex.               Melani received the following direct contact modalities after being cleared for contraindications for 0 minutes:  -     Melani received the following manual therapy techniques for 5minutes:   -Performed retrograde massage to decrease edema, improve joint mobility, decrease pain and improve lymphatic drainage to increase hand function.   -Performed scar massage to CTR incision area to decrease adhesions and improve tensile glide.         Melani received therapeutic exercises for 35  minutes including:  -  AROM   Wrist flex/ext  Wrist RD/UD X 10 reps each   AROM - wave, hook, straight fist, composite fist, lifts, spreads, pinky slides X 10 reps each   Prayer stretch 10 sec x 3   Elbow PRE X 3# x 3/10 elbow flexion   Wrist PRE X2# x 2/10 x 3 ways  X 2 # weighted ball  x 1/10 x flx/ext   Downgraded to yellow per pt reporting adverse rxn to skin  @ home X 3 min twirling  X 10 squeezes  X 10 lumbrical  squeeze  X 5 donut extensions-   X 3 logs each: lateral pinch, 3 pt    Resistive hand gripper (gold rung 3) X 29# x 1/15  X Rung 2: 1/ 15   isospheres 2 min   Red flex bar X 2/10 frown/smiles   FM task and pinch strengthening with blue-  CP clothespin and medium pompoms 1 container   Blue foam add/ yellow RB abd  X 20 each   Wall push ups Able to do 15/15 push ups- used blue foam under right palm           Melani participated in dynamic functional " therapeutic activities to improve functional performance for 0  minutes, including:  -none today      Home Exercises and Education Provided     Education provided:  Encouraged pt to continue attempting typical daily use to increase endurance and strength. Pt. demo'd understanding. Cont theraputty strengthening with yellow only putty  and AROM HEP as instructed  - Progress towards goals     Written Home Exercises Provided: Patient instructed to cont prior HEP with yellow putty.  Exercises were reviewed and Melani was able to demonstrate them prior to the end of the session.  Melani demonstrated good  understanding of the education provided.   .   See EMR under Patient Instructions for exercises provided 11/12/2018.     Assessment     Melani Sloan is a 28 y.o. female referred to outpatient occupational/hand therapy and presents with a medical diagnosis of Right CTR s/p 11 weeks.She reports she is feeling a lot better. She is able to hold her clippers longer at work and also able to lift the bigger dogs onto the grooming table. Therapist again observed area of redness over dorsal thumb this date prior to start of session..  Pt reports she cont to have some difficulty with using the clippers at work and requires rest breaks. She reports she does not have the numbness today.  pt able to perform wrist PRE 2 sets of 10 with dumbbell type weight and 1 set of 10 (flx/xt) with 2# weighted ball without complaints.  Pt. Able to do all 15 wall push ups this date. Pt. Denied need for ice at end of session. Pt. States she feels she will be ready to dc on this Thursday appt. Therapist in agreement.       Melani is progressing fairly well towards her goals and there are no updates to goals at this time. Pt prognosis continues as Good. Pt will continue to benefit from skilled outpatient occupational therapy to address the deficits listed in the problem list on initial evaluation, provide pt/family education and to maximize pt's  level of independence in the home and community environment.     Anticipated barriers to continued occupational therapy: none    Pt's spiritual, cultural and educational needs considered and pt agreeable to plan of care and goals.    Goals     The following goals were discussed with the patient and patient is in agreement with them as to be addressed in the treatment plan.   Long Term Goals (LTGs); to be met by discharge.  LTG #1: Pt will report a pain level of 2 out of 10 with ADLs     LTG #2: Pt will demo improved FOTO score by 20 points.   LTG #3: Pt will return to prior level of function for ADLs and household management.      Short Term Goals (STGs); to be met within 4 weeks (11/05/2018).  STG #1a: Pt will report 4 out of 10 pain level with ADLs. NOT MET  STG #2a: Pt will report/demo [place] with carrying >10# to lift animals for work ADLs. NOT MET  STG #2b: Pt will report/demo [place] with work ADLS per report NOT MET  STG #3a: Pt will demonstrate independence with issued HEP. MET  STG #3b: Pt will demo improved  strength of right hand by 10#  needed to aid with work tasks. MET 11/19/2018    Plan     MD sent new orders for cont therapy, strengthening  Continue skilled occupational therapy with individualized plan of care 2x/week for 8 weeks from 10/08/2018 to 12/07/2018.    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     Lilliana Riley, SID, OTR/L, CHT   Occupational therapist, Certified Hand Therapist

## 2018-11-29 ENCOUNTER — CLINICAL SUPPORT (OUTPATIENT)
Dept: REHABILITATION | Facility: HOSPITAL | Age: 28
End: 2018-11-29
Payer: MEDICAID

## 2018-11-29 DIAGNOSIS — R29.898 WEAKNESS OF RIGHT HAND: ICD-10-CM

## 2018-11-29 DIAGNOSIS — M25.531 PAIN IN RIGHT WRIST: ICD-10-CM

## 2018-11-29 PROCEDURE — 97530 THERAPEUTIC ACTIVITIES: CPT | Mod: PN

## 2018-11-29 NOTE — PROGRESS NOTES
"  Occupational Therapy Daily Treatment Note      Date: 11/29/2018  Name: Melani Sloan  Clinic Number: [id number]     Medical Diagnosis: Right Carpal tunnel s/p Release  Date of Surgery: 08/31/2018  Surgical Procedure: CTR  Therapy Diagnosis:        Encounter Diagnosis   Name Primary?    Pain in right wrist        Precautions: vagal nerve stimulator- Left neck/Left chest     Physician: Idalmis Waddell MD  Physician Orders: eval and treat- ROM/ strengthening  Date of Return to MD: Jan 2019     Evaluation Date: 10/8/2018  Plan of Care Certification Period: 10/08/2018-12/08/2018     Visit #:/ Visits authorized: 14/ 16  Insurance Authorization period Expiration: 12/08/2018     Time In:855AM  Time Out:940 AM  Total Billable Time: 45 minutes   Charges for this Visit: TA x 3    Subjective     Pt reports: "  My hand isn't really hurting me. The areas where it does hurt I think it will just be like that for a while. I can do most of my work now. "   she was compliant with home exercise program given last session.   Response to previous treatment:pain and numbness  Functional change: able to hold clippers longer, able to lift bigger dogs onto table    Pain: 0/10  Location: NA    Objective *= involved side      Observation/Appearance: well healed incision- dense scar tissue present     Elbow and Wrist ROM.WNL       Strength (Dynamometer) and Pinch Strength (Pinch Gauge)  Measured in pounds.    10/8/2018 10/8/2018 10/22/2018 10/29/2018 11/07/2018 11/19/2018 11/29/2018     Left Right * Right Right Right Right Right   Rung II 45 23 20 (-3) 27 (+7) 25 (-2) 35 (+10) 40 (+5)   Scott Pinch 11 10 7 (-3) 9 (+2) 9 (=) 10 (+1) 13 (+3)   3pt Pinch 14 10 8 (-2) 10 (+2) 9 (-1) 10 (+1) 16 (+6)   2pt Pinch 8 8 9 (+1) 9 (=) 7 (-2) 8 (+1) 11 (+3)              CMS Impairment/Limitation/Restriction for FOTO Survey  Therapist reviewed FOTO scores for Melani Sloan.  FOTO documents entered into [employer] - see Media section.   * 11/19/2018:  pt " "stating " I just answer the questions however because the questions are complicated" Previous therapist states she went over all questions with pt at 10th visit and explained all questions.   Intake Limitation Score: 49% - 10/8/2018  5th visit limitation score: 55%-10/29/2018  10th visit limitation score: 46% - 11/14/18  14th  DC visit limitation score:  20%-11/29/2018        Melani received the following supervised modalities after being cleared for contradictions for 10 minutes:   -Patient received fluidotherapy to right hand(s) for 10 min  to increase blood flow, circulation, pain management and for tissue elasticity prior to therex.               Melani received the following direct contact modalities after being cleared for contraindications for 0 minutes:  -     Melani received the following manual therapy techniques for 5minutes:   -Performed retrograde massage to decrease edema, improve joint mobility, decrease pain and improve lymphatic drainage to increase hand function.   -Performed scar massage to CTR incision area to decrease adhesions and improve tensile glide.         Melani received therapeutic exercises for 30  minutes including:  -  AROM   Wrist flex/ext  Wrist RD/UD X 10 reps each   AROM - wave, hook, straight fist, composite fist, lifts, spreads, pinky slides X 10 reps each   Prayer stretch 10 sec x 3   Elbow PRE X 3# x 3/10 elbow flexion   Wrist PRE X2# x 2/1 x 3 ways  X 2 # weighted ball  x 1/10 x flx/ext   [place] putty X 3 min twirling  X 10 squeezes  X 10 lumbrical  squeeze  X 5 donut extensions-   X 3 logs each: lateral pinch, 3 pt            Melani participated in dynamic functional therapeutic activities to improve functional performance for 0  minutes, including:  -none today      Home Exercises and Education Provided     Education provided:  Encouraged pt to continue attempting typical daily use to increase endurance and strength. Pt. demo'd understanding. Cont theraputty " strengthening with yellow only putty  and AROM HEP as instructed.   - Progress towards goals     Written Home Exercises Provided: yes  Wrist and elbow PRE  Exercises were reviewed and Melani was able to demonstrate them prior to the end of the session.  Melani demonstrated good  understanding of the education provided.   .   See EMR under Patient Instructions for exercises provided 11/12/2018.     Assessment     Melani Sloan is a 28 y.o. female referred to outpatient occupational/hand therapy and presents with a medical diagnosis of Right CTR s/p 12 weeks.She reports she is feeling a lot better. She is able to hold her clippers longer at work and also able to lift the bigger dogs onto the grooming table. Therapist again observed area of redness over dorsal thumb this date prior to start of session. Pt. Requests additional red medium resistance putty to add to her yellow at home this date. She reports she does not have the numbness today.. Pt. Denied need for ice at end of session. Pt. States she feels she is ready to dc today. Therapist in agreement.   Therapist reviewed all strengthening HEPs this date with pt demo'ing understanding. Provided additional putty and a stress ball for home use.         Anticipated barriers to continued occupational therapy: none    Pt's spiritual, cultural and educational needs considered and pt agreeable to plan of care and goals.          Plan     REHAB SERVICES OUTPATIENT DISCHARGE SUMMARY  Occupational Therapy      Name:  Melani Sloan    Total # Of Visits:  14  Diagnosis:    1. Pain in right wrist     2. Weakness of right hand         Physical/Functional Status:  At time of discharge, patient was able to see above    The patient is to be discharged from our Therapy service for the following reason(s):  Patient has met all of his/her goals    Degree of Goal Achievement:  Patient has met all goals    Patient Education:  DC to HEP    Discharge Plan:  Home Program:  DC to HEP.  Pt in agreement with JERILYN.         Lilliana Riley, OTD, OTR/L, CHT   Occupational therapist, Certified Hand Therapist

## 2018-11-29 NOTE — PATIENT INSTRUCTIONS
[facility], OCCUPATIONAL THERAPY  HOME EXERCISE PROGRAM     Complete the following strengthening exercises using a 3 pound weight.  Do 3 sets of 10 repetitions, 1x/day.     Resisted Elbow Flexion  With arm straight, holding weight, bend elbow then straighten.         [facility]  OCCUPATIONAL THERAP[facility]  HOME EXERCISE PROGRAM     Complete the following strengthening exercises using 2pound weight.  Do 3 sets of 15 repetitions of each, 1x per day.         Resisted Wrist Flexion  With palm up and weight in hand, bend wrist up. Return slowly.      Resisted Wrist Extension  With palm down and weight in hand, bend wrist up. Then bend wrist down.      Resisted Wrist Deviation  With thumb up and weight in hand, bend wrist up. Return slowly.    Copyright © [facility]. All rights reserved.           Therapist: SID Acevedo, OTR/L, CHT   Occupational therapist, Certified Hand Therapist         ELBOW: Wall Push-Up        With arms slightly wider than shoulders, gently lean body to wall. Push body away from wall by straightening elbows.  __15-20_ reps per set, _1__ sets per day.     Copyright © [facility]. All rights reserved.

## 2019-05-09 DIAGNOSIS — G56.01 CARPAL TUNNEL SYNDROME ON RIGHT: Primary | ICD-10-CM

## 2019-05-24 ENCOUNTER — CLINICAL SUPPORT (OUTPATIENT)
Dept: REHABILITATION | Facility: HOSPITAL | Age: 29
End: 2019-05-24
Payer: MEDICAID

## 2019-05-24 DIAGNOSIS — R20.2 NUMBNESS AND TINGLING IN LEFT HAND: ICD-10-CM

## 2019-05-24 DIAGNOSIS — R20.0 NUMBNESS AND TINGLING IN LEFT HAND: ICD-10-CM

## 2019-05-24 PROCEDURE — L3906 WHO W/O JOINTS CF: HCPCS | Mod: PN

## 2019-05-24 NOTE — PROGRESS NOTES
OT Orthosis Only    TIME RECORD    Date:  5/24/2019    Start Time:  955AM  Stop Time:  1020AM    PROCEDURES:      UNTIMED  Procedure Min.    Left -               Total Timed Minutes:  0  Total Timed Units:  0  Total Untimed Units:  1  Charges Billed/# of units:    Left x 1    Occupational Therapy Orthotic Note    Patient: Melani Sloan  MRN: [id number]  Date of visit: 5/24/2019  Referring Physician:  Jamil Perez III*   Next MD visit: June 2019  Primary Diagnosis: Carpal tunnel syndrome Left- conservative treatment  Treatment Diagnosis:   Encounter Diagnosis   Name Primary?    Numbness and tingling in left hand        Subjective:   Pt. Reports good fit of orthosis following fabrication. Pt. Verbalized understanding of wear, care, and precautions.     Objective:     Patient seen by OT this session for fabrication of orthosis per MD orders. Fabricated and applied  left wrist cock up orthosis  Assessment:     Orthosis with good fit following fabrication. Pt. Able to levi/doff independently.      Patient Education/Response:   Pt. Instructed to wear continuous, remove for bathing or hygiene. Patient/caregiver were provided written instructions on orthosis purpose, wear schedule, care and precautions to monitor for increased pain/edema, pressure points, skin breakdown or redness/skin irritation Patient/caregiver to contact clinic for adjustments as needed.  Patient signed copy of orthosis instructions, acknowledging delivery and understanding of wear, care, and precautions     (see Media for scanned documents)    Plans and Goals:     Goal of Orthosis to  decrease symptoms of carpal tunnel syndrome in left hand/wrist. Pt. To DC with  Orthosis. Orthosis Check PRN, f/u with MD at [facility]

## 2019-05-24 NOTE — PATIENT INSTRUCTIONS
[facility] Occupational Therapy  Orthosis Instructions and Proof of Delivery        Date: 5/24/2019  Name: Melani Sloan  MRN: [id number]  YOB: 1990  Referring Provider: Jamil Perez III*  Diagnosis: Carpal Tunnel Syndrome Left      [facility] Level II Code and Description   LEFT     Orthosis Information  (X) Custom Fabricated    (X) Left                                           (X) Static                                       Orthosis Instructions    (X) Wear the orthosis at night only    (X) Wear the orthosis as needed to minimize your symptoms    Cleaning and Maintenance  Keep your orthosis away from any heat sources. Do not leave in your car.  Keep your orthosis away from pets.  You may clean your orthosis with cool water and soap or a mild cleanser.  Your orthosis may need adjustments due to changes in your medical condition (swelling, dressing size, additional surgery, etc.)  Monitor your skin color and integrity.  Do not try to adjust the orthosis on your own.     If you have any questions or concerns, please contact the therapy office at [phone].     I, Melani Sloan , have personally received the above described orthosis along with instructions on the wear, care, and precautions related to this orthosis. I understand that I am responsible for payment to [facility] of my deductible, coinsurance, or other portion of my charges not paid in full by my insurance plans, including any item that is not covered under the insurance plan.     Signature: _________________________________ Date: __________________    Therapist:SID Rivera, OTR/L, CHT   Occupational therapist, Certified Hand Therapist       '

## 2019-05-28 ENCOUNTER — DOCUMENTATION ONLY (OUTPATIENT)
Dept: REHABILITATION | Facility: HOSPITAL | Age: 29
End: 2019-05-28

## 2019-05-28 NOTE — PROGRESS NOTES
Pt. Presents for adjustments in orthosis fabricated last week. Pt. States she feels her thumb is allowed to move too much and is causing her pain. She reports pain over the radial styloid. Completely re-heated and re-molded orthosis to restrict more thumb movement. Pt. Verbalized comfort following adjustments. Educated pt that she may go to the drug store or [employer] and get and off the shelf thumb spica type splint that may be more comfortable and more immobilizing as therapist does not have orders for thumb spica orthosis. Pt. Verbalized understanding. Lilliana Thorpe, OTEUGENIE, OTR/L, CHT   Occupational therapist, Certified Hand Therapist

## 2019-07-10 PROBLEM — R20.2 NUMBNESS AND TINGLING IN LEFT HAND: Status: RESOLVED | Noted: 2019-05-24 | Resolved: 2019-07-10

## 2019-07-10 PROBLEM — R20.0 NUMBNESS AND TINGLING IN LEFT HAND: Status: RESOLVED | Noted: 2019-05-24 | Resolved: 2019-07-10

## 2019-07-31 DIAGNOSIS — M65.832 EXTENSOR INTERSECTION SYNDROME, LEFT: Primary | ICD-10-CM

## 2019-07-31 DIAGNOSIS — M65.831 OTHER SYNOVITIS AND TENOSYNOVITIS, RIGHT FOREARM: Primary | ICD-10-CM

## 2019-08-13 ENCOUNTER — CLINICAL SUPPORT (OUTPATIENT)
Dept: REHABILITATION | Facility: HOSPITAL | Age: 29
End: 2019-08-13
Attending: ORTHOPAEDIC SURGERY
Payer: MEDICAID

## 2019-08-13 DIAGNOSIS — M25.532 PAIN IN LEFT WRIST: ICD-10-CM

## 2019-08-13 DIAGNOSIS — R29.898 WEAKNESS OF LEFT HAND: ICD-10-CM

## 2019-08-13 PROCEDURE — 97165 OT EVAL LOW COMPLEX 30 MIN: CPT | Mod: PN

## 2019-08-13 NOTE — PATIENT INSTRUCTIONS
[facility]  [phone]  JUSTINO CAPELLAN, OTD, OTR/L, CHT  OCCUPATIONAL THERAPIST, CERTIFIED HAND THERAPIST    Composite Flexion (Active Extensor Stretch)        Curl fingers into fist, bend wrist down, and straighten elbow. Hold __10__ seconds.  Repeat ___3_ times. Do _4___ sessions per day.        Copyright © I. All rights reserved.    Wrist Stretch        Extend left arm with fingers facing down. With right hand, gently pull fingers of right hand toward body. Hold position for 10 seconds. If painful, keep elbow bent by side  Repeat __3_ times, alternating arms. Do _4__ times per day.    Copyright © I. All rights reserved.         Complete 10 repetitions

## 2019-08-13 NOTE — PLAN OF CARE
[facility]  Initial Evaluation     Date: 8/13/2019  Name: Melani Sloan  Clinic Number: [id number]    Therapy Diagnosis:   Encounter Diagnoses   Name Primary?    Pain in left wrist     Weakness of left hand      Physician: Idalmis Waddell MD    Physician Orders: eval and treat  Medical Diagnosis: FCU tendinitis  Date of Onset:  07/ 2019  Evaluation Date: 08/13/2019  Insurance Authorization Period Expiration: TBD  Plan of Care Certification Period: 08/13/2019-09/27/2019  Date of Return to MD: 09/10/2019    Visit # / Visits authorized: 1 / TBD    FOTO: initial eval      Time In:950AM  Time Out: 1030AM  Total treatment time: 40minutes  Total Timed [id number] minutes    Precautions:  Standard and seizures, vagal nerve stimulator    Subjective     Involved Side: Left  Dominant Side: Right  Date of Onset: 07/2019  Mechanism of Injury: overuse  History of Current Condition: Pt. States she started having pain in the wrist last month. It hurts when she has to control or sustain grasp on dogs for work, picking up or carry heavy items for work.   Surgical Procedure: NA  Imaging: at OSH  Previous Therapy: Pt. Seen for right side s/p CTR for 12 visits last year, 1 visit this year for orthosis wear    Past Medical History/Physical Systems Review:   Melani Sloan  has a past medical history of Seizures.    Melani Sloan  has a past surgical history that includes Vagus nerve stimulator insertion.    Melani has a current medication list which includes the following prescription(s): brivaracetam, brivaracetam, felbamate, hydrocodone-acetaminophen, naproxen, and zonisamide.    Review of patient's allergies indicates:   Allergen Reactions    Benadryl [diphenhydramine hcl]      Drug interactions        Patient's Goals for Therapy: "to not have surgery like the right hand"    Pain:  Functional Pain Scale Rating 0-10:   2/10 on average  0/10 at best  6/10 at worst  Location: left wrist, " along ulnar side  Description: Throbbing  Aggravating Factors: Bending and Lifting  Easing Factors: ice and rest    Occupation:  - owns own business   Working presently: self-employed  Duties: lifting, pushing, pulling, carrying >30#    Functional Limitations/Social History:    Previous functional status includes: Independent with all ADLs.     Current FunctionalStatus   Home/Living environment : lives with a friend      Limitation of Functional Status as follows:   ADLs/IADLs:     - Feeding: no deficits reported    - Bathing: no deficits reported     - Dressing/Grooming: no deficits reported    - Driving: no deficits reported     Leisure: no deficits reported    WORK: Pt. Reports most deficits are from work tasks, lifting, carrying, or controlling dogs for grooming.         Objective     Observation/Appearance: Pt. Presents to evaluation carrying 2 cell phones in left hand. Pt. States she uses these for work    Edema. Measured in centimeters.   8/13/2019 8/13/2019    Right Left   Wrist Crease 15.2 15.2   MCPs 18.0 18.0       Elbow and Wrist ROM. Measured in degrees.   8/13/2019 8/13/2019    Right Left             Wrist Ext/Flex 60/70 50/65   Wrist RD/UD 25/30 30/25          Strength (Dynamometer) and Pinch Strength (Pinch Gauge)  Measured in pounds.   8/13/2019 8/13/2019    Right Left   Rung II 50 45   Key Pinch 15 12   3pt Pinch 19 13   2pt Pinch 10 8     Sensation : numbness reported at times, especially in dependent position     Manual Muscle Test   8/13/2019 8/13/2019    Right Left   Wrist Extension  5 4   Wrist Flexion 5 4   Radial Deviation 5 4   Ulnar Deviation 5 4             CMS Impairment/Limitation/Restriction for FOTO wrist Survey    Therapist reviewed FOTO scores for Melani Sloan on 8/13/2019.   FOTO documents entered into [employer] - see Media section.    Limitation Score: 40%           Treatment     Treatment Time In: 1010am  Treatment Time Out: 1030am  Total Treatment time separate from  Evaluation time:20 min    Melani received the following supervised modalities after being cleared for contradictions for 10 minutes:   -Patient received paraffin bath with moist heat pack to left hand for 10 minutes to increase blood flow, circulation, pain management and for tissue elasticity prior to therex.       Melani received therapeutic exercises for 10 minutes including:  -  Wrist flexor stretch (elbow extend with fist)    Wrist extensors/ FCU stretch X 10 sec hold x 3     Wave, hook, straight fist, composite fist, finger spreads, , pinky slides   X 10 reps each            Home Exercise Program/Education:  Issued HEP (see patient instructions in EMR) and educated on modality use for pain management . Exercises were reviewed and Melani was able to demonstrate them prior to the end of the session.   Pt received a written copy of exercises to perform at home. Melani demonstrated good  understanding of the education provided.  Pt was advised to perform these exercises free of pain, and to stop performing them if pain occurs.    Patient/Family Education: role of OT, goals for OT, scheduling/cancellations - pt verbalized understanding. Discussed insurance limitations with patient.    Additional Education provided: Use if ice/heat PRN, use of wrist cuff splint off the shelf from pharmacy to help support wrist when grooming dogs, decrease carrying of objects in left hand to rest wrist.     Assessment     Melani Sloan is a 29 y.o. female referred to outpatient occupational therapy and presents with a medical diagnosis of left wrist FCU tendinitis, resulting in increased pain during lifting or holding dogs at work and demonstrates limitations as described in the chart below. Following medical record review it is determined that pt will benefit from occupational therapy services in order to maximize pain free and/or functional use of left hand/wrist. The following goals were discussed with the patient and  patient is in agreement with them as to be addressed in the treatment plan. The patient's rehab potential is Good.     Anticipated barriers to occupational therapy: low pain tolerance  Pt has no cultural, educational or language barriers to learning provided.    Profile and History Assessment of Occupational Performance Level of Clinical Decision Making Complexity Score   Occupational Profile:   Melani Sloan is a 29 y.o. 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 Melani Sloan has difficulty with  pt states she only has pain with lifting or holding animals with the left hand. Otherrwise does not interfere with her daily occcupations    affecting his/her daily functional abilities. His/her main goal for therapy is to build strength to not get surgery in the hand.     Comorbidities:   Past Medical History:   Diagnosis Date    Seizures      Right CTR 2018    Medical and Therapy History Review:   Brief               Performance Deficits    Physical:  Joint Mobility  Muscle Endurance   Strength  Pinch Strength  Pain    Cognitive:  Attention  Communication    Psychosocial:    No Deficits     Clinical Decision Making:  low    Assessment Process:  Problem-Focused Assessments    Modification/Need for Assistance:  Not Necessary    Intervention Selection:  Limited Treatment Options       low  Based on PMHX, co morbidities , data from assessments and functional level of assistance required with task and clinical presentation directly impacting function.         Goals:   The following goals were discussed with the patient and patient is in agreement with them as to be addressed in the treatment plan.   Long Term Goals (LTGs); to be met by discharge.  LTG #1: Pt will report a pain level of 0 out of 10 with lifting dogs for work   LTG #2: Pt will demo improved FOTO score by 20 points.   LTG #3: Pt will return to prior level of function for ADLs and household management.      Short Term Goals (STGs); to be met within 4 weeks (09/13/2019).  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  STG #2a: Pt will report/demo [place] with lifting small dogs.  STG #2b: Pt will report/demo [place] with grooming large dogs.  STG #3a: Pt will demonstrate independence with issued HEP.   STG #3b: Pt will demo improved left  strength by 5# to increase strength for work adls.        Plan   Certification Period/Plan of care expiration: 8/13/2019 to 09/27/2019    Outpatient Occupational Therapy 2 times weekly for 6 weeks to include the following interventions: Paraffin, Manual therapy/joint mobilizations, Modalities for pain management, US 3 mhz, Therapeutic exercises/activities., Strengthening, Orthotic Fabrication/Fit/Training and Joint Protection.      Lilliana Thorpe, OTR/L,CHT

## 2019-08-19 NOTE — PROGRESS NOTES
"  Occupational Therapy Daily Treatment Note      Date: 8/20/2019  Name: Melani Sloan  Clinic Number: [id number]    Therapy Diagnosis:   Encounter Diagnoses   Name Primary?    Pain in left wrist     Weakness of left hand      Physician: Idalmis Waddell MD    Physician Orders: eval and treat  Medical Diagnosis: FCU tendinitis  Date of Onset:  07/ 2019  Evaluation Date: 08/13/2019  Insurance Authorization Period Expiration: TBD  Plan of Care Certification Period: 08/13/2019-09/27/2019  Date of Return to MD: 09/10/2019     Visit # / Visits authorized: 2 / 16     FOTO: initial eval        Time In:820AM  Time Out: 901AM  Total treatment time: 41minutes  Total Timed [id number] minutes     Precautions:  Standard and seizures, vagal nerve stimulator    Subjective     Pt reports: " My hand was numb from the wrist to the fingers the day I was here last. I had another time that it went numb a few days ago too"   she was compliant with home exercise program given last session.   Response to previous treatment: increase in numbness symptoms  Functional change: none reported    Pain: 0/10  Location: left NA    Objective     Elbow and Wrist ROM. Measured in degrees.    8/13/2019 8/13/2019     Right Left                   Wrist Ext/Flex 60/70 50/65   Wrist RD/UD 25/30 30/25             Strength (Dynamometer) and Pinch Strength (Pinch Gauge)  Measured in pounds.    8/13/2019 8/13/2019     Right Left   Rung II 50 45   Key Pinch 15 12   3pt Pinch 19 13   2pt Pinch 10 8      Sensation : numbness reported at times, especially in dependent position      Manual Muscle Test    8/13/2019 8/13/2019     Right Left   Wrist Extension  5 4   Wrist Flexion 5 4   Radial Deviation 5 4   Ulnar Deviation 5 4          Melani received the following supervised modalities after being cleared for contradictions for 10 minutes:   -Patient received paraffin bath with moist heat pack to left hand for 10 minutes to increase blood flow, circulation, pain " management and for tissue elasticity prior to therex.         Melani received therapeutic exercises for 31 minutes including:  -  Wrist flexor stretch (elbow extend with fist)      X 10 sec hold x 3      Wave, hook, straight fist, composite fist, finger spreads, , pinky slides    X 10 reps each    Yellow Theraputty X 3 min mold  X 10 squeezes  X 5 pancake/bloom  X 3 logs 3 pt pinch  X 3 logs lat pinch       Elbow PRE X 2# x 3 ways flexion x 20   X red theraband tricep ext x 20          Home Exercises and Education Provided     Education provided: Cont HEP 4 xday, stop doing the FCU stretches, add elbow and yellow theraputty for strengthening  - Progress towards goals     Written Home Exercises Provided: yes.  Exercises were reviewed and Melani was able to demonstrate them prior to the end of the session.  Melani demonstrated good  understanding of the education provided.   .   See EMR under Patient Instructions for exercises provided 8/20/2019.     Assessment   Pt. Presents for first visist after initial evaluation. She reports increase in numbness in hand from wrist down. Unsure if pt is compliant with HEP prescribed- reminded pt that she should be doing 4 xday. Pt. To stop doing the FCU stretch at this time due to increase in symptoms. Pt required min verbal assistance and demo for HEP this date. Added light theraputty and elbow PRE for strengthening.Pt. States she does not do much upper extremity strengthening at the gym, mainly lower extremity.     Melani is progressing well towards her goals and there are no updates to goals at this time. Pt prognosis continues as Excellent. Pt will continue to benefit from skilled outpatient occupational therapy to address the deficits listed in the problem list on initial evaluation, provide pt/family education and to maximize pt's level of independence in the home and community environment.     Anticipated barriers to continued occupational therapy: low pain  tolerance    Pt's spiritual, cultural and educational needs considered and pt agreeable to plan of care and goals.    Goals     Goals:   The following goals were discussed with the patient and patient is in agreement with them as to be addressed in the treatment plan.   Long Term Goals (LTGs); to be met by discharge.  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 -progressing  LTG #3: Pt will return to prior level of function for ADLs and household management.  -progressing     Short Term Goals (STGs); to be met within 4 weeks (09/13/2019).  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 -progressing  STG #2a: Pt will report/demo [place] with lifting small dogs. -progressing  STG #2b: Pt will report/demo [place] with grooming large dogs. -progressing  STG #3a: Pt will demonstrate independence with issued HEP.  -progressing  STG #3b: Pt will demo improved left  strength by 5# to increase strength for work adls. -progressing    Plan     Continue skilled occupational therapy with individualized plan of care focusing on increasing strength and function for daily and work occupations      Updates/Grading for next session: cont to progress as able    Lilliana Thorpe OTR/L,CHT

## 2019-08-20 ENCOUNTER — CLINICAL SUPPORT (OUTPATIENT)
Dept: REHABILITATION | Facility: HOSPITAL | Age: 29
End: 2019-08-20
Attending: ORTHOPAEDIC SURGERY
Payer: MEDICAID

## 2019-08-20 DIAGNOSIS — R29.898 WEAKNESS OF LEFT HAND: ICD-10-CM

## 2019-08-20 DIAGNOSIS — M25.532 PAIN IN LEFT WRIST: ICD-10-CM

## 2019-08-20 PROCEDURE — 97530 THERAPEUTIC ACTIVITIES: CPT | Mod: PN

## 2019-08-20 NOTE — PATIENT INSTRUCTIONS
[facility]  [phone]  SID MCMILLAN, OTR/L, CHT  OCCUPATIONAL THERAPIST, CERTIFIED HAND THERAPIST                                                    [facility], OCCUPATIONAL THERAPY  HOME EXERCISE PROGRAM     Complete the following strengthening exercises using a 2 pound weight.  Do 20 repetitions, 3x/week.     Resisted Elbow Flexion  With arm straight, holding weight, bend elbow then straighten.   Perform in 3 forearm positions: thumb up, palm down, and palm up.           Resisted Elbow Extension  Holding weight, straighten arm overhead.   Slowly bend elbow then repeat.

## 2019-08-27 ENCOUNTER — CLINICAL SUPPORT (OUTPATIENT)
Dept: REHABILITATION | Facility: HOSPITAL | Age: 29
End: 2019-08-27
Attending: ORTHOPAEDIC SURGERY
Payer: MEDICAID

## 2019-08-27 DIAGNOSIS — R29.898 WEAKNESS OF LEFT HAND: ICD-10-CM

## 2019-08-27 DIAGNOSIS — M25.532 PAIN IN LEFT WRIST: ICD-10-CM

## 2019-08-27 PROCEDURE — 97530 THERAPEUTIC ACTIVITIES: CPT | Mod: PN

## 2019-08-27 NOTE — PROGRESS NOTES
"  Occupational Therapy Daily Treatment Note      Date: 8/27/2019  Name: Melani Sloan  Clinic Number: [id number]    Therapy Diagnosis:   Encounter Diagnoses   Name Primary?    Pain in left wrist     Weakness of left hand      Physician: Idalmis Waddell MD    Physician Orders: eval and treat  Medical Diagnosis: FCU tendinitis  Date of Onset:  07/ 2019  Evaluation Date: 08/13/2019  Insurance Authorization Period Expiration: TBD  Plan of Care Certification Period: 08/13/2019-09/27/2019  Date of Return to MD: 09/10/2019     Visit # / Visits authorized: 3 / 16     FOTO: initial eval        Time In:9AM  Time Out: 952AM  Total treatment time: 52minutes  Total Timed minutes 42 minutes     Precautions:  Standard and seizures, vagal nerve stimulator    Subjective     Pt reports: " My hand got numb but in a different spot this time. It happened when I was working"   she was compliant with home exercise program given last session.   Response to previous treatment: increase in numbness symptoms  Functional change: none reported    Pain: 0/10  Location: left NA    Objective     Elbow and Wrist ROM. Measured in degrees.    8/13/2019 8/13/2019     Right Left                   Wrist Ext/Flex 60/70 50/65   Wrist RD/UD 25/30 30/25             Strength (Dynamometer) and Pinch Strength (Pinch Gauge)  Measured in pounds.    8/13/2019 8/13/2019     Right Left   Rung II 50 45   Key Pinch 15 12   3pt Pinch 19 13   2pt Pinch 10 8      Sensation : numbness reported at times, especially in dependent position      Manual Muscle Test    8/13/2019 8/13/2019     Right Left   Wrist Extension  5 4   Wrist Flexion 5 4   Radial Deviation 5 4   Ulnar Deviation 5 4          Melani received the following supervised modalities after being cleared for contradictions for 10 minutes:   -Patient received paraffin bath with moist heat pack to left hand for 10 minutes to increase blood flow, circulation, pain management and for tissue elasticity prior to " therex.         Melani received therapeutic exercises for 42 minutes including:  -  Wrist flexor stretch (elbow extend with fist)      X 10 sec hold x 3      Wave, hook, straight fist, composite fist, finger spreads, , pinky slides    X 10 reps each    Yellow Theraputty X 3 min mold  X 10 squeezes  X 5 pancake/bloom  X 3 logs 3 pt pinch  X 3 logs lat pinch       Elbow PRE X 2# x 3 ways flexion x 20   X red theraband tricep ext x 20   Ulnar nerve glides X 10 reps see EMR   Shoulder rows X 20 red theraband          Home Exercises and Education Provided     Education provided: Cont HEP 4 xday, stop doing the FCU stretches, add elbow and yellow theraputty for strengthening, added ulnar nerve glide, prayer stretch, rows with theraband (RED)  - Progress towards goals     Written Home Exercises Provided: yes.  Exercises were reviewed and Melani was able to demonstrate them prior to the end of the session.  Melani demonstrated good  understanding of the education provided.   .   See EMR under Patient Instructions for exercises provided 08/27/2019.     Assessment    She reports increase in numbness in hand along ulnar 3 fingers and into thenar area. She reports increased numbness during heavy lifting at work recently. She feels therapy is helping her . She states she has been doing her exercises 2 x day. Pt required min verbal assistance and demo for HEP this date.  Added ulnar nerve glides, theraband rows for posture and prayer stretch to HEP this date.     Melani is progressing well towards her goals and there are no updates to goals at this time. Pt prognosis continues as Excellent. Pt will continue to benefit from skilled outpatient occupational therapy to address the deficits listed in the problem list on initial evaluation, provide pt/family education and to maximize pt's level of independence in the home and community environment.     Anticipated barriers to continued occupational therapy: low pain  tolerance    Pt's spiritual, cultural and educational needs considered and pt agreeable to plan of care and goals.    Goals     Goals:   The following goals were discussed with the patient and patient is in agreement with them as to be addressed in the treatment plan.   Long Term Goals (LTGs); to be met by discharge.  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 -progressing  LTG #3: Pt will return to prior level of function for ADLs and household management.  -progressing     Short Term Goals (STGs); to be met within 4 weeks (09/13/2019).  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 -progressing  STG #2a: Pt will report/demo [employer] with lifting small dogs. 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 -progressing  STG #3a: Pt will demonstrate independence with issued HEP.  -progressing  STG #3b: Pt will demo improved left  strength by 5# to increase strength for work adls. -progressing    Plan     Continue skilled occupational therapy with individualized plan of care focusing on increasing strength and function for daily and work occupations      Updates/Grading for next session: cont to progress as able    Lilliana Thorpe OTR/L,CHT

## 2019-08-27 NOTE — PATIENT INSTRUCTIONS
NERVE GLIDING    Complete 10 repetitions of each exercise 4-6 times a day.      ULNAR NERVE: Flossing I    Stand with right arm at shoulder height, hand and fingers bent back. Simultaneously bend elbow and wrist.        Do prayer stretch 2x day only    Hold for 30 sec, 3x          Complete the following exercises with 20 repetitions, 3-5x/week.         Face anchor, medium to wide stance. Thumbs up, pull arms back,   squeezing shoulder blades together.           Copyright © [employer]. All rights reserved.

## 2019-09-03 ENCOUNTER — DOCUMENTATION ONLY (OUTPATIENT)
Dept: REHABILITATION | Facility: HOSPITAL | Age: 29
End: 2019-09-03

## 2019-09-03 ENCOUNTER — CLINICAL SUPPORT (OUTPATIENT)
Dept: REHABILITATION | Facility: HOSPITAL | Age: 29
End: 2019-09-03
Attending: ORTHOPAEDIC SURGERY
Payer: MEDICAID

## 2019-09-03 DIAGNOSIS — M25.532 PAIN IN LEFT WRIST: ICD-10-CM

## 2019-09-03 DIAGNOSIS — R29.898 WEAKNESS OF LEFT HAND: ICD-10-CM

## 2019-09-03 PROCEDURE — 97530 THERAPEUTIC ACTIVITIES: CPT | Mod: PN

## 2019-09-03 NOTE — PROGRESS NOTES
"  Occupational Therapy Daily Treatment Note      Date: 9/3/2019  Name: Melani Sloan  Clinic Number: [id number]    Therapy Diagnosis:   Encounter Diagnoses   Name Primary?    Pain in left wrist     Weakness of left hand      Physician: Idalmis Waddell MD    Physician Orders: eval and treat  Medical Diagnosis: FCU tendinitis  Date of Onset:  07/ 2019  Evaluation Date: 08/13/2019  Insurance Authorization Period Expiration: TBD  Plan of Care Certification Period: 08/13/2019-09/27/2019  Date of Return to MD: 09/10/2019     Visit # / Visits authorized: 4 / 16 * FOTO NEXT VISIT*     FOTO: initial eval        Time In:830AM  Time Out: 918M  Total treatment time: 48minutes  Total Timed minutes 38 minutes TA x 3     Precautions:  Standard and seizures, vagal nerve stimulator    Subjective     Pt reports: " It's getting better. I did the exercises but I feel tightness in my shoulder now"   she was compliant with home exercise program given last session.   Response to previous treatment: increase in numbness symptoms  Functional change: none reported    Pain: 0/10  Location: left NA    Objective   /  Elbow and Wrist ROM. Measured in degrees.    8/13/2019 8/13/2019 09/03/2019     Right Left Left                     Wrist Ext/Flex 60/70 50/65 60/70   Wrist RD/UD 25/30 30/25 35/30             Strength (Dynamometer) and Pinch Strength (Pinch Gauge)  Measured in pounds.    8/13/2019 8/13/2019 09/03/2019     Right Left Left   Rung II 50 45 45 (=) pain in CMC   Key Pinch 15 12 12 (=)   3pt Pinch 19 13 14 (+1)   2pt Pinch 10 8 9 (+1)      Sensation : numbness reported at times, especially in dependent position      Manual Muscle Test    8/13/2019 8/13/2019 09/03/2019     Right Left Left   Wrist Extension  5 4 5   Wrist Flexion 5 4 5   Radial Deviation 5 4 5   Ulnar Deviation 5 4 5          Melani received the following supervised modalities after being cleared for contradictions for 10 minutes:   -Patient received paraffin bath " with moist heat pack to left hand for 10 minutes to increase blood flow, circulation, pain management and for tissue elasticity prior to therex.         Melani received therapeutic exercises for 38 minutes including:  -  Wrist flexor stretch (elbow extend with fist)      X 10 sec hold x 3      Wave, hook, straight fist, composite fist, finger spreads, , pinky slides    X 10 reps each    Peach Theraputty X 3 min mold  X 10 squeezes  X 5 pancake/bloom  X 3 logs 3 pt pinch  X 3 logs lat pinch     Elbow PRE X 3# x 3 ways flexion x 20   X red theraband tricep ext x 20   Ulnar nerve glides X 10 reps see EMR   Prayer stretch X 30 sec x 3    Shoulder rows X 20 red theraband          Home Exercises and Education Provided     Education provided: Cont HEP 4 xday, stop doing the FCU stretches, add elbow and yellow theraputty for strengthening, added ulnar nerve glide, prayer stretch, rows with theraband (RED)  - Progress towards goals     Written Home Exercises Provided: Patient instructed to cont prior HEP.  Exercises were reviewed and Melani was able to demonstrate them prior to the end of the session.  Melani demonstrated good  understanding of the education provided.   .   See EMR under Patient Instructions for exercises provided 08/27/2019.     Assessment    She reports muscle tightness in upper traps after use of theraband.   She reports feeling pulling sensation over radial styloid with pinky slides this date. She states she has a old splint for the right side that she says is too tight on the thumb and is going to request the doctor order another one to be made for the right side. Therapist stated that we can attempt to loosen the thumb area on the current one as well. Pt. Verbalized understanding. Pt. Instructed on proper form with rows due to pt having incorrect form which may be leading to her increases tightness in upper traps.We attempted use of peach theraputty this date with pt reporting some increase in pain  at wrist this date but wanted to continue the exercises. Pt. Will see MD at [facility] on 09/10/2019-therapist to send updated progress note to MD this date.      Melani is progressing well towards her goals and there are no updates to goals at this time. Pt prognosis continues as Excellent. Pt will continue to benefit from skilled outpatient occupational therapy to address the deficits listed in the problem list on initial evaluation, provide pt/family education and to maximize pt's level of independence in the home and community environment.     Anticipated barriers to continued occupational therapy: low pain tolerance    Pt's spiritual, cultural and educational needs considered and pt agreeable to plan of care and goals.    Goals     Goals:   The following goals were discussed with the patient and patient is in agreement with them as to be addressed in the treatment plan.   Long Term Goals (LTGs); to be met by discharge.  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 -progressing  LTG #3: Pt will return to prior level of function for ADLs and household management.  -progressing     Short Term Goals (STGs); to be met within 4 weeks (09/13/2019).  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 -progressing  STG #2a: Pt will report/demo [place] with lifting small dogs. -progressing  STG #2b: Pt will report/demo [place] with grooming large dogs. -progressing  STG #3a: Pt will demonstrate independence with issued HEP.  -progressing  STG #3b: Pt will demo improved left  strength by 5# to increase strength for work adls. -progressing    Plan     Continue skilled occupational therapy with individualized plan of care focusing on increasing strength and function for daily and work occupations      Updates/Grading for next session: cont to progress as able, pt will have seen MD on  09/10/2019. Discussed bringing in old orthosis for right hand and therapist can attempt to loosen the area around the thumb. Pt. Will need to be scheduled 1 x wk for 4 weeks if MD requests continued therapy.     Lilliana Thorpe, OTR/L,CHT

## 2019-09-03 NOTE — PROGRESS NOTES
OCCUPATIONAL THERAPY RETURN TO DOCTOR PROGRESS NOTE            Patient: Melani Sloan  MRN: [id number]  Date of Evaluation:08/13/2019  Referring Physician:  Dr. Bairon Jay MD visit: 09/10/2019  Primary Diagnosis: FCU tendinitis Left  Treatment Diagnosis:   1. Pain in left wrist     2. Weakness of left hand       DOI: July 2018  Certification Period:  08/13/2019 to 09/27/2019  Precautions:  standard        TOTAL VISITS:    4    Pt. Has been attending therapy 1 x wk due to pt with very limited deficits other than pain. Pt. Reports she feels therapy is helping and she is feeling better. She is independent with HEP for strengthening. We attempted increased resistance theraputty to medium soft this date from soft and pt reports some increase in pain with this. She has some increased tightness in the upper traps with shoulder strengthening for increased proximal stability. Corrected form this date with this exercise. Pt. Without any change in  strength this date however is WNL compared to non dominant hand.   AROM is WNL. Pt. With only 1 additional visit scheduled.     Objective:     Elbow and Wrist ROM. Measured in degrees.    8/13/2019 8/13/2019 09/03/2019     Right Left Left                       Wrist Ext/Flex 60/70 50/65 60/70   Wrist RD/UD 25/30 30/25 35/30             Strength (Dynamometer) and Pinch Strength (Pinch Gauge)  Measured in pounds.    8/13/2019 8/13/2019 09/03/2019     Right Left Left   Rung II 50 45 45 (=) pain in CMC   Key Pinch 15 12 12 (=)   3pt Pinch 19 13 14 (+1)   2pt Pinch 10 8 9 (+1)      Sensation : numbness reported at times, especially in dependent position      Manual Muscle Test    8/13/2019 8/13/2019 09/03/2019     Right Left Left   Wrist Extension  5 4 5   Wrist Flexion 5 4 5   Radial Deviation 5 4 5   Ulnar Deviation 5 4 5          Lilliana Thorpe, OTD, OTR/L, CHT   Occupational therapist, Certified Hand Therapist  [facility]  [phone]  phone  [phone] fax

## 2019-09-11 ENCOUNTER — CLINICAL SUPPORT (OUTPATIENT)
Dept: REHABILITATION | Facility: HOSPITAL | Age: 29
End: 2019-09-11
Attending: ORTHOPAEDIC SURGERY
Payer: MEDICAID

## 2019-09-11 DIAGNOSIS — M25.532 PAIN IN LEFT WRIST: ICD-10-CM

## 2019-09-11 DIAGNOSIS — R29.898 WEAKNESS OF LEFT HAND: ICD-10-CM

## 2019-09-11 PROCEDURE — 97530 THERAPEUTIC ACTIVITIES: CPT | Mod: PN

## 2019-09-11 NOTE — PROGRESS NOTES
"  Occupational Therapy Daily Treatment Note      Date: 9/11/2019  Name: Melani Sloan  Clinic Number: [id number]    Therapy Diagnosis:   Encounter Diagnoses   Name Primary?    Pain in left wrist     Weakness of left hand      Physician: Idalmis Waddell MD    Physician Orders: eval and treat  Medical Diagnosis: L FCU tendinitis  Date of Onset:  07/ 2019  Evaluation Date: 08/13/2019  Insurance Authorization Period Expiration: TBD  Plan of Care Certification Period: 08/13/2019-09/27/2019  Date of Return to MD: 09/10/2019     Visit # / Visits authorized: 5 / 16      FOTO: initial eval 41%, 5th Visit 31%        Time In: 1250 PM  Time Out: 145 PM  Total treatment time: 55 minutes  Total Timed minutes 45 minutes   TA x 3     Precautions:  Standard and seizures, vagal nerve stimulator    Subjective     Pt reports: "My doctor wants me to continue therapy on both hands."  she was compliant with home exercise program given last session.   Response to previous treatment: increase in numbness symptoms  Functional change: none reported    Pain: 0/10  Location: left NA    Objective   /  Elbow and Wrist ROM. Measured in degrees.    8/13/2019 8/13/2019 09/03/2019     Right Left Left                     Wrist Ext/Flex 60/70 50/65 60/70   Wrist RD/UD 25/30 30/25 35/30             Strength (Dynamometer) and Pinch Strength (Pinch Gauge)  Measured in pounds.    8/13/2019 8/13/2019 09/03/2019     Right Left Left   Rung II 50 45 45 (=) pain in CMC   Key Pinch 15 12 12 (=)   3pt Pinch 19 13 14 (+1)   2pt Pinch 10 8 9 (+1)      Sensation : numbness reported at times, especially in dependent position      Manual Muscle Test    8/13/2019 8/13/2019 09/03/2019     Right Left Left   Wrist Extension  5 4 5   Wrist Flexion 5 4 5   Radial Deviation 5 4 5   Ulnar Deviation 5 4 5          Melani received the following supervised modalities after being cleared for contradictions for 10 minutes:   -Patient received paraffin bath with moist heat " pack to left hand for 10 minutes to increase blood flow, circulation, pain management and for tissue elasticity prior to therex.      Melani received manual therapy for 5 minutes including:   -Performed Retrograde massage to left fingers/hand/wrist to decrease edema, improve joint mobility, decrease pain and improve lymphatic drainage to increase hand function.   -Performed STM to palm and wrist at points of pain and surrounding tissue for pain, edema, and scar tissue mgmt    Melani received therapeutic exercises for 40 minutes including:  -  Wrist flexor stretch (elbow extend with fist)      X 10 sec hold x 3      Wave, hook, straight fist, composite fist, finger spreads, , pinky slides    X 10 reps each    Peach Theraputty X 3 min mold  X 10 squeezes  X 5 pancake/bloom  X 3 logs 3 pt pinch  X 3 logs lat pinch     Elbow PRE X 3# x 3 ways flexion x 20   X red theraband tricep ext x 20   Ulnar nerve glides X 10 reps see EMR   Prayer stretch X 30 sec x 3    Shoulder rows X 20 red theraband          Home Exercises and Education Provided     Education provided: Cont HEP 4 xday, stop doing the FCU stretches, add elbow and yellow theraputty for strengthening, added ulnar nerve glide, prayer stretch, rows with theraband (RED)  - Progress towards goals     Written Home Exercises Provided: Patient instructed to cont prior HEP.  Exercises were reviewed and Melani was able to demonstrate them prior to the end of the session.  Melani demonstrated good  understanding of the education provided.   .   See EMR under Patient Instructions for exercises provided 08/27/2019.     Assessment   Pt tolerated tx fairly well with no increase in pain during tx.  Pt reports pain in wrist during gym workouts when grasping bars or dumbbells. Rec use of wrist supports to stabilize wrist maintaining proper position during resistive exercises.  She continues to report muscle tightness in upper traps after use of theraband.  She required vc's  for correct form during UB PRE's. She continues with pulling sensation over radial styloid with pinky slides this date. She also reports numbness in small and ring finger.     Melani is progressing well towards her goals and there are no updates to goals at this time. Pt prognosis continues as Excellent. Pt will continue to benefit from skilled outpatient occupational therapy to address the deficits listed in the problem list on initial evaluation, provide pt/family education and to maximize pt's level of independence in the home and community environment.     Anticipated barriers to continued occupational therapy: low pain tolerance    Pt's spiritual, cultural and educational needs considered and pt agreeable to plan of care and goals.    Goals     Goals:   The following goals were discussed with the patient and patient is in agreement with them as to be addressed in the treatment plan.   Long Term Goals (LTGs); to be met by discharge.  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 -progressing  LTG #3: Pt will return to prior level of function for ADLs and household management.  -progressing     Short Term Goals (STGs); to be met within 4 weeks (09/13/2019).  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 -progressing  STG #2a: Pt will report/demo [place] with lifting small dogs. -progressing  STG #2b: Pt will report/demo [place] with grooming large dogs. -progressing  STG #3a: Pt will demonstrate independence with issued HEP.  -progressing  STG #3b: Pt will demo improved left  strength by 5# to increase strength for work adls. -progressing    Plan     Continue skilled occupational therapy with individualized plan of care focusing on increasing strength and function for daily and work occupations      Updates/Grading for next session: cont to progress as able, pt will  have seen MD on 09/10/2019. Discussed bringing in old orthosis for right hand and therapist can attempt to loosen the area around the thumb. Pt. Will need to be scheduled 1 x wk for 4 weeks if MD requests continued therapy.     CHINMAY Mcdonald/KIRSTIN

## 2019-09-13 DIAGNOSIS — M65.832 EXTENSOR INTERSECTION SYNDROME, LEFT: Primary | ICD-10-CM

## 2019-09-16 DIAGNOSIS — M65.831 EXTENSOR INTERSECTION SYNDROME OF RIGHT WRIST: Primary | ICD-10-CM

## 2019-09-16 DIAGNOSIS — M65.832 EXTENSOR INTERSECTION SYNDROME OF LEFT WRIST: ICD-10-CM

## 2019-09-19 ENCOUNTER — CLINICAL SUPPORT (OUTPATIENT)
Dept: REHABILITATION | Facility: HOSPITAL | Age: 29
End: 2019-09-19
Payer: MEDICAID

## 2019-09-19 DIAGNOSIS — M25.532 PAIN IN LEFT WRIST: ICD-10-CM

## 2019-09-19 DIAGNOSIS — R29.898 WEAKNESS OF LEFT HAND: ICD-10-CM

## 2019-09-19 PROCEDURE — 97530 THERAPEUTIC ACTIVITIES: CPT | Mod: PN

## 2019-09-19 NOTE — PROGRESS NOTES
"  Occupational Therapy Daily Treatment Note      Date: 9/19/2019  Name: Melani Sloan  Clinic Number: [id number]    Therapy Diagnosis:   Encounter Diagnoses   Name Primary?    Pain in left wrist     Weakness of left hand      Physician: Idalmis Waddell MD    Physician Orders: eval and treat  Medical Diagnosis: L FCU tendinitis  Date of Onset:  07/ 2019  Evaluation Date: 08/13/2019  Insurance Authorization Period Expiration: TBD  Plan of Care Certification Period: 08/13/2019-09/27/2019  Date of Return to MD: 09/10/2019     Visit # / Visits authorized: 6 / 16      FOTO: initial eval 41%, 5th Visit 31%        Time In: 1:45 PM  Time Out: 2:40 PM  Total treatment time: 55 minutes  Total Timed minutes 45 minutes   TA x 3     Precautions:  Standard and seizures, vagal nerve stimulator    Subjective     Pt reports: "I really want to have the thumb included with the splint. And my left still hurts." Planned on fabricating custom volar wrist cock up this date, however, pt declined this orthosis. She requested wrist orthosis to include thumb. Discussed getting new orders for orthosis to include thumb if pt would like that. Then will fabricate the orthosis when get the new orthosis order.   she was compliant with home exercise program given last session.   Response to previous treatment: increase in numbness symptoms  Functional change: none reported    Pain: 2/10  Location: left NA    Objective   /  Elbow and Wrist ROM. Measured in degrees.    8/13/2019 8/13/2019 09/03/2019     Right Left Left                     Wrist Ext/Flex 60/70 50/65 60/70   Wrist RD/UD 25/30 30/25 35/30             Strength (Dynamometer) and Pinch Strength (Pinch Gauge)  Measured in pounds.    8/13/2019 8/13/2019 09/03/2019     Right Left Left   Rung II 50 45 45 (=) pain in CMC   Key Pinch 15 12 12 (=)   3pt Pinch 19 13 14 (+1)   2pt Pinch 10 8 9 (+1)      Sensation : numbness reported at times, especially in dependent position      Manual Muscle " Test    8/13/2019 8/13/2019 09/03/2019     Right Left Left   Wrist Extension  5 4 5   Wrist Flexion 5 4 5   Radial Deviation 5 4 5   Ulnar Deviation 5 4 5          Melani received the following supervised modalities after being cleared for contradictions for 10 minutes:   -Patient received paraffin bath with moist heat pack to left hand for 10 minutes to increase blood flow, circulation, pain management and for tissue elasticity prior to therex.      Melani received manual therapy for 5 minutes including:   -Performed Retrograde massage to left fingers/hand/wrist to decrease edema, improve joint mobility, decrease pain and improve lymphatic drainage to increase hand function.   -Performed STM to palm and wrist at points of pain and surrounding tissue for pain, edema, and scar tissue mgmt    Melani received therapeutic exercises for 40 minutes including:  -  Wrist flexor stretch (elbow extend with fist)      X 10 sec hold x 3      Wave, hook, straight fist, composite fist, finger spreads, , pinky slides    X 10 reps each    Peach Theraputty X 3 min mold  X 10 squeezes  X 5 pancake/bloom  X 3 logs 3 pt pinch  X 3 logs lat pinch     Elbow PRE X 3# x 3 ways flexion x 20   X red theraband tricep ext x 20   Ulnar nerve glides X 10 reps see EMR   Prayer stretch X 30 sec x 3    Shoulder rows X 20 red theraband          Home Exercises and Education Provided     Education provided: Cont HEP 4 xday, stop doing the FCU stretches; elbow and yellow theraputty for strengthening, added ulnar nerve glide, prayer stretch, rows with theraband (RED)  - Progress towards goals     Written Home Exercises Provided: Patient instructed to cont prior HEP.  Exercises were reviewed and Melani was able to demonstrate them prior to the end of the session.  Melani demonstrated good  understanding of the education provided.   .   See EMR under Patient Instructions for exercises provided 08/27/2019.     Assessment   Pt tolerated tx fairly well  with some c/o pain at L thumb CMC with putty ex. See above in Subjective regarding orthosis fabrication.  She continues to report muscle tightness in upper traps after use of theraband.  She required vc's for postural techniques. She cont to report mild numbness in fingers this date.     Melani is progressing well towards her goals and there are no updates to goals at this time. Pt prognosis continues as Excellent. Pt will continue to benefit from skilled outpatient occupational therapy to address the deficits listed in the problem list on initial evaluation, provide pt/family education and to maximize pt's level of independence in the home and community environment.     Anticipated barriers to continued occupational therapy: low pain tolerance    Pt's spiritual, cultural and educational needs considered and pt agreeable to plan of care and goals.    Goals     Goals:   The following goals were discussed with the patient and patient is in agreement with them as to be addressed in the treatment plan.   Long Term Goals (LTGs); to be met by discharge.  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 -progressing  LTG #3: Pt will return to prior level of function for ADLs and household management.  -progressing     Short Term Goals (STGs); to be met within 4 weeks (09/13/2019).  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 -progressing  STG #3a: Pt will demonstrate independence with issued HEP.  -progressing  STG #3b: Pt will demo improved left  strength by 5# to increase strength for work adls. -progressing    Plan     Continue skilled occupational therapy per MD orders 1x/week for 4 weeks with individualized plan of care  focusing on increasing strength and function for daily and work occupations      Updates/Grading for next session: cont to progress as able. Custom fabricate orthosis when get new orders.     Corinna Lenz, OTR/L

## 2019-09-20 DIAGNOSIS — M65.831 EXTENSOR INTERSECTION SYNDROME, RIGHT: ICD-10-CM

## 2019-09-20 DIAGNOSIS — M65.832 EXTENSOR INTERSECTION SYNDROME, LEFT: Primary | ICD-10-CM

## 2019-09-24 ENCOUNTER — CLINICAL SUPPORT (OUTPATIENT)
Dept: REHABILITATION | Facility: HOSPITAL | Age: 29
End: 2019-09-24
Attending: ORTHOPAEDIC SURGERY
Payer: MEDICAID

## 2019-09-24 DIAGNOSIS — M25.532 PAIN IN LEFT WRIST: ICD-10-CM

## 2019-09-24 DIAGNOSIS — R29.898 WEAKNESS OF LEFT HAND: ICD-10-CM

## 2019-09-24 PROCEDURE — 97530 THERAPEUTIC ACTIVITIES: CPT | Mod: PN

## 2019-09-30 DIAGNOSIS — M65.831 EXTENSOR INTERSECTION SYNDROME, RIGHT: Primary | ICD-10-CM

## 2019-10-08 ENCOUNTER — CLINICAL SUPPORT (OUTPATIENT)
Dept: REHABILITATION | Facility: HOSPITAL | Age: 29
End: 2019-10-08
Attending: ORTHOPAEDIC SURGERY
Payer: MEDICAID

## 2019-10-08 DIAGNOSIS — M25.532 PAIN IN LEFT WRIST: ICD-10-CM

## 2019-10-08 DIAGNOSIS — R29.898 WEAKNESS OF LEFT HAND: ICD-10-CM

## 2019-10-08 DIAGNOSIS — M25.531 PAIN IN RIGHT WRIST: ICD-10-CM

## 2019-10-08 PROCEDURE — L3808 WHFO, RIGID W/O JOINTS: HCPCS | Mod: PN

## 2019-10-08 NOTE — PROGRESS NOTES
OT Orthosis Only    TIME RECORD    Date:  10/8/2019    Start Time:  940am  Stop Time:  1025am    PROCEDURES:      UNTIMED  Procedure Min.    -               Total Timed Minutes:  0  Total Timed Units:  0  Total Untimed Units:  1  Charges Billed/# of units:   [id number] 1    Occupational Therapy Orthotic Note    Patient: Melani Sloan  MRN: [id number]  Date of visit: 10/8/2019  Referring Physician:  Idalmis Waddell MD   Next MD visit: unkwn  Primary Diagnosis: FCU tendinitis right hand  Treatment Diagnosis:   Encounter Diagnoses   Name Primary?    Pain in left wrist     Weakness of left hand     Pain in right wrist          Subjective:   I want to not be able to bend my wrist at night    Objective:     Patient seen by OT this session for fabrication of orthosis per MD orders. Fabricated and applied  Long thumb spica orthosis.  Assessment:     Orthosis with good fit following fabrication. Pt. Able to levi/doff independently.      Patient Education/Response:   Pt. Instructed to wear continuous, remove for bathing or hygiene. Patient/caregiver were provided written instructions on orthosis purpose, wear schedule, care and precautions to monitor for increased pain/edema, pressure points, skin breakdown or redness/skin irritation Patient/caregiver to contact clinic for adjustments as needed.  Patient signed copy of orthosis instructions, acknowledging delivery and understanding of wear, care, and precautions     (see Media for scanned documents)    Plans and Goals:     Goal of Orthosis to decrease pain at night per MD. Orthosis Check PRN, f/u with MD at RTD

## 2019-10-08 NOTE — PATIENT INSTRUCTIONS
[facility] Occupational Therapy  Orthosis Instructions and Proof of Delivery        Date: 10/8/2019  Name: Melani Sloan  MRN: [id number]  YOB: 1990  Referring Provider: Idalmis Waddell MD  Diagnosis: FCU tendinitis         [facility] Level II Code and Description      Orthosis Information  (X) Custom Fabricated              (X) Right                                                                              Orthosis Instructions    (X) Wear the orthosis at night only    Cleaning and Maintenance  Keep your orthosis away from any heat sources. Do not leave in your car.  Keep your orthosis away from pets.  You may clean your orthosis with cool water and soap or a mild cleanser.  Your orthosis may need adjustments due to changes in your medical condition (swelling, dressing size, additional surgery, etc.)  Monitor your skin color and integrity.  Do not try to adjust the orthosis on your own.     If you have any questions or concerns, please contact the therapy office at [phone].     I, Melani Sloan , have personally received the above described orthosis along with instructions on the wear, care, and precautions related to this orthosis. I understand that I am responsible for payment to [facility] of my deductible, coinsurance, or other portion of my charges not paid in full by my insurance plans, including any item that is not covered under the insurance plan.     Signature: _________________________________ Date: __________________    Therapist:SID Ruiz, OTR/L, CHT   Occupational therapist, Certified Hand Therapist

## 2019-10-11 ENCOUNTER — CLINICAL SUPPORT (OUTPATIENT)
Dept: REHABILITATION | Facility: HOSPITAL | Age: 29
End: 2019-10-11
Attending: ORTHOPAEDIC SURGERY
Payer: MEDICAID

## 2019-10-11 DIAGNOSIS — M25.531 PAIN IN RIGHT WRIST: ICD-10-CM

## 2019-10-11 DIAGNOSIS — M25.532 PAIN IN LEFT WRIST: ICD-10-CM

## 2019-10-11 DIAGNOSIS — R29.898 WEAKNESS OF LEFT HAND: ICD-10-CM

## 2019-10-11 PROCEDURE — 97530 THERAPEUTIC ACTIVITIES: CPT | Mod: PN

## 2019-10-11 PROCEDURE — 97763 ORTHC/PROSTC MGMT SBSQ ENC: CPT | Mod: PN

## 2019-10-11 NOTE — PROGRESS NOTES
"  Occupational Therapy Daily Treatment Note      Date: 10/11/2019  Name: Melani Sloan  Clinic Number: [id number]    Therapy Diagnosis:   Encounter Diagnoses   Name Primary?    Pain in right wrist     Pain in left wrist     Weakness of left hand      Physician: Idalmis Waddell MD    Physician Orders: eval and treat  Medical Diagnosis: B FCU tendinitis  Date of Onset:  07/ 2019  Evaluation Date: 08/13/2019  Insurance Authorization Period Expiration: 10/11/2019- date extension applied for   Plan of Care Certification Period: 08/13/2019-09/27/2019  Date of Return to MD: 09/10/2019     Visit # / Visits authorized: 8 / 16      FOTO: initial eval 41%, 5th Visit 31%        Time In: 830AM  Time Out: 935 AM  Total treatment time: 65 minutes  Total Timed minutes 65 minutes   TA x 4     Precautions:  Standard and seizures, vagal nerve stimulator    Subjective     Pt reports: " The brace is hurting my hand too much for me to wear it. Can you fix it?   she was compliant with home exercise program given last session.   Response to previous treatment: increase in numbness symptoms  Functional change: none reported    Pain: 2/10  Location: left NA    Objective   /  Elbow and Wrist ROM. Measured in degrees.    8/13/2019 8/13/2019 09/03/2019 10/11/2019 10/11/2019     Right Left Left Right Left                         Wrist Ext/Flex 60/70 50/65 60/70 55/80 55/70   Wrist RD/UD 25/30 30/25 35/30 25/45 30/35             Strength (Dynamometer) and Pinch Strength (Pinch Gauge)  Measured in pounds.    8/13/2019 8/13/2019 09/03/2019 10/11/2019 10/11/2019     Right Left Left Right Left   Rung II 50 45 45 (=) pain in CMC 33 40   Key Pinch 15 12 12 (=) 15 13   3pt Pinch 19 13 14 (+1) 16 15   2pt Pinch 10 8 9 (+1) 10 8      Sensation : numbness reported at times, especially in dependent position      Manual Muscle Test    8/13/2019 8/13/2019 09/03/2019     Right Left Left   Wrist Extension  5 4 5   Wrist Flexion 5 4 5   Radial Deviation 5 " 4 5   Ulnar Deviation 5 4 5          Melani received the following supervised modalities after being cleared for contradictions for 10 minutes:   -Patient received paraffin bath with moist heat pack to bilateral hands for 10 minutes to increase blood flow, circulation, pain management and for tissue elasticity prior to therex.          Melani received therapeutic exercises for 40 minutes including: Bilateral hands  -  Wave, hook, straight fist, composite fist, finger spreads, , pinky slides    X 10 reps each    Yellow Theraputty X 3 min mold  X 10 squeezes  X 5 pancake/bloom  X 3 logs 3 pt pinch  X 3 logs lat pinch     Wrist PRE X 2# x 3 ways 20reps   Elbow PRE X 5# x 3 ways flexion x 20   X red theraband tricep ext x 20   Ulnar nerve glides X 10 reps see EMR   Prayer stretch X 30 sec x 3       Ortho fit and train : 15 min- time spent adjusting orthosis for improved fit. Pt. Verbalized comfort of fit after adjustments.       Home Exercises and Education Provided     Education provided: Cont HEP 4 xday, stop doing the FCU stretches; elbow and yellow theraputty for strengthening, added ulnar nerve glide, prayer stretch, rows with theraband (RED), ice and wear brace as much as possible to rest the wrist and hand.   - Progress towards goals     Written Home Exercises Provided: Patient instructed to cont prior HEP.  Exercises were reviewed and Melani was able to demonstrate them prior to the end of the session.  Melani demonstrated good  understanding of the education provided.   .   See EMR under Patient Instructions for exercises provided 08/27/2019.     Assessment   Re-assessment for addition of right hand completed this date. Pt. Has lost 20 # of  strength in her right hand in 2 month.Quetionable if pt was giving full effort this date.  Pt again Noted to be on cell phone  At times throughout session. . She cont to report mild numbness in fingers of ulnar digits of left hand this date. She is worried that she  will need sx on the left hand. Pt. States she is working out at gym 3 x week and performing UE resistance exercises. Encouraged pt to continue 3 x week with yellow theraputty for both hands and AROM for both hands daily as instructed. Pt. Also advised to purchase 2 # weight and do wrist PRE 3 x week as well. Pt. Has written instructions for all exercises from previous visits.     Melani is progressing well towards her goals and there are no updates to goals at this time. Pt prognosis continues as Excellent. Pt will continue to benefit from skilled outpatient occupational therapy to address the deficits listed in the problem list on initial evaluation, provide pt/family education and to maximize pt's level of independence in the home and community environment.     Anticipated barriers to continued occupational therapy: low pain tolerance    Pt's spiritual, cultural and educational needs considered and pt agreeable to plan of care and goals.    Goals     Goals:   The following goals were discussed with the patient and patient is in agreement with them as to be addressed in the treatment plan.   Long Term Goals (LTGs); to be met by discharge.  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 -progressing  LTG #3: Pt will return to prior level of function for ADLs and household management.  -progressing     Short Term Goals (STGs); to be met within 4 weeks (09/13/2019).  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 -progressing  STG #2a: Pt will report/demo [place] with lifting small dogs. 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 -progressing  STG #3a: Pt will demonstrate independence with issued HEP.  -progressing  STG #3b: Pt will demo improved left  strength by 5# to increase strength for work adls. -progressing    Plan     Continue  skilled occupational therapy per MD orders 1x/week for 4 weeks with individualized plan of care focusing on increasing strength and function for daily and work occupations      Updates/Grading for next session: cont to progress as able. - awaiting auth date extension.     Lilliana Thorpe, OTR/L,CHT

## 2019-10-15 ENCOUNTER — CLINICAL SUPPORT (OUTPATIENT)
Dept: REHABILITATION | Facility: HOSPITAL | Age: 29
End: 2019-10-15
Attending: ORTHOPAEDIC SURGERY
Payer: MEDICAID

## 2019-10-15 DIAGNOSIS — M25.532 PAIN IN LEFT WRIST: ICD-10-CM

## 2019-10-15 DIAGNOSIS — R29.898 WEAKNESS OF LEFT HAND: ICD-10-CM

## 2019-10-15 DIAGNOSIS — M25.531 PAIN IN RIGHT WRIST: ICD-10-CM

## 2019-10-15 PROCEDURE — 97530 THERAPEUTIC ACTIVITIES: CPT | Mod: PN

## 2019-10-15 NOTE — PROGRESS NOTES
"  Occupational Therapy Daily Treatment Note      Date: 10/15/2019  Name: Melani Sloan  Clinic Number: [id number]    Therapy Diagnosis:   Encounter Diagnoses   Name Primary?    Pain in right wrist     Pain in left wrist     Weakness of left hand      Physician: Idalmis Waddell MD    Physician Orders: eval and treat  Medical Diagnosis: B FCU tendinitis  Date of Onset:  07/ 2019  Evaluation Date: 08/13/2019  Insurance Authorization Period Expiration: 11/22/2019   Plan of Care Certification Period: 09/28/2019-11/15/2019  Date of Return to MD: pt unsure     Visit # / Visits authorized: 9 / 16      FOTO: initial eval 41%, 5th Visit 31%        Time In:10AM  Time Out: 1055AM  Total treatment time: 55 minutes  Total Timed minutes 55 minutes   TA x 4     Precautions:  Standard and seizures, vagal nerve stimulator    Subjective     Pt reports: " My wrist is hurting on the left"    she was compliant with home exercise program given last session.   Response to previous treatment: increase in numbness symptoms  Functional change: none reported    Pain: 2/10  Location: left NA    Objective   /  Elbow and Wrist ROM. Measured in degrees.    8/13/2019 8/13/2019 09/03/2019 10/15/2019 10/15/2019     Right Left Left Right Left                         Wrist Ext/Flex 60/70 50/65 60/70 55/80 55/70   Wrist RD/UD 25/30 30/25 35/30 25/45 30/35             Strength (Dynamometer) and Pinch Strength (Pinch Gauge)  Measured in pounds.    8/13/2019 8/13/2019 09/03/2019 10/15/2019 10/15/2019     Right Left Left Right Left   Rung II 50 45 45 (=) pain in CMC 33 (-27) 40 (-5)   [place] Pinch 15 12 12 (=) 15 (=) 13(+1)   3pt Pinch 19 13 14 (+1) 16 (-3) 15 (+1)   2pt Pinch 10 8 9 (+1) 10 (=) 8 (-1)      Sensation : numbness reported at times, especially in dependent position      Manual Muscle Test    8/13/2019 8/13/2019 09/03/2019 10/15/2019 10/15/2019     Right Left Left Right Left   Wrist Extension  5 4 5 5 5   Wrist Flexion 5 4 5 5 5   Radial " Deviation 5 4 5 5 5   Ulnar Deviation 5 4 5 5 5          Melani received the following supervised modalities after being cleared for contradictions for 10 minutes:   -Patient received paraffin bath with moist heat pack to bilateral hands for 10 minutes to increase blood flow, circulation, pain management and for tissue elasticity prior to therex.          Melani received therapeutic exercises for 45 minutes including: Bilateral hands  -  Iastym  X 5 min to FA and palm left side only   Wave, hook, straight fist, composite fist, finger spreads, , pinky slides    X 10 reps each    Yellow Theraputty X 3 min mold  X 10 squeezes  X 5 pancake/bloom  X 3 logs 3 pt pinch  X 3 logs lat pinch     Wrist PRE X 2# x 3 ways 20reps   Elbow PRE X 5# x 3 ways flexion x 10  X red theraband tricep ext x 20   Ulnar nerve glides X 10 reps see EMR   Prayer stretch X 30 sec x 3            Home Exercises and Education Provided     Education provided: Cont HEP 4 xday, stop doing the FCU stretches; elbow and yellow or peach theraputty for strengthening, added ulnar nerve glide, prayer stretch, rows with theraband (RED), ice and wear brace as much as possible to rest the wrist and hand.   - Progress towards goals     Written Home Exercises Provided: Patient instructed to cont prior HEP.  Exercises were reviewed and Melani was able to demonstrate them prior to the end of the session.  Melani demonstrated good  understanding of the education provided.   .   See EMR under Patient Instructions for exercises provided 08/27/2019.     Assessment   Pt. States left wrist is hurting today. She demonstrates fibours tissue in FA and palm, renetta at level if FCU. Pt. States she is not stretching thoughout the day. 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 Also to use ice after each client x 5 min to try to decrease inflammation. Pt. States she has not been doing any of this and will try. Pt states she feels she can try the peach putty at home. Provided peach putty for home use.   Melani is progressing well towards her goals and there are no updates to goals at this time. Pt prognosis continues as Excellent. Pt will continue to benefit from skilled outpatient occupational therapy to address the deficits listed in the problem list on initial evaluation, provide pt/family education and to maximize pt's level of independence in the home and community environment.     Anticipated barriers to continued occupational therapy: low pain tolerance    Pt's spiritual, cultural and educational needs considered and pt agreeable to plan of care and goals.    Goals     Goals:   The following goals were discussed with the patient and patient is in agreement with them as to be addressed in the treatment plan.   Long Term Goals (LTGs); to be met by discharge.  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 -progressing  LTG #3: Pt will return to prior level of function for ADLs and household management.  -progressing     Short Term Goals (STGs); to be met within 4 weeks (09/13/2019).  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 -progressing  STG #2a: Pt will report/demo [place] with lifting small dogs. 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 -progressing  STG #3a: Pt will demonstrate independence with issued HEP.  -progressing  STG #3b: Pt will demo improved left  strength by 5# to increase strength for work adls. -progressing    Plan     Continue skilled occupational therapy  with individualized plan of care focusing on increasing strength and function for daily and work  occupations      Updates/Grading for next session: cont to progress as able    Lilliana Thorpe, OTR/L,CHT

## 2019-10-22 ENCOUNTER — CLINICAL SUPPORT (OUTPATIENT)
Dept: REHABILITATION | Facility: HOSPITAL | Age: 29
End: 2019-10-22
Attending: ORTHOPAEDIC SURGERY
Payer: MEDICAID

## 2019-10-22 DIAGNOSIS — M25.532 PAIN IN LEFT WRIST: ICD-10-CM

## 2019-10-22 DIAGNOSIS — R29.898 WEAKNESS OF LEFT HAND: ICD-10-CM

## 2019-10-22 DIAGNOSIS — M25.531 PAIN IN RIGHT WRIST: ICD-10-CM

## 2019-10-22 PROCEDURE — 97530 THERAPEUTIC ACTIVITIES: CPT | Mod: PN

## 2019-10-22 NOTE — PROGRESS NOTES
"  Occupational Therapy Daily Treatment Note      Date: 10/22/2019  Name: Melani Sloan  Clinic Number: [id number]    Therapy Diagnosis:   Encounter Diagnoses   Name Primary?    Pain in right wrist     Pain in left wrist     Weakness of left hand      Physician: Idalmis Waddell MD    Physician Orders: eval and treat  Medical Diagnosis: B FCU tendinitis  Date of Onset:  07/ 2019  Evaluation Date: 08/13/2019  Insurance Authorization Period Expiration: 11/22/2019   Plan of Care Certification Period: 09/28/2019-11/15/2019  Date of Return to MD: pt unsure     Visit # / Visits authorized: 10 / 16      FOTO: initial eval 41%, 5th Visit 31%        Time In:1PM  Time Out: 155PM  Total treatment time: 55 minutes  Total Timed minutes 55 minutes   TA x 4     Precautions:  Standard and seizures, vagal nerve stimulator    Subjective     Pt reports: " I saw the doctor and she said she couldn't do much without your notes. I forgot to tell her about the numbness"    she was compliant with home exercise program given last session.   Response to previous treatment: increase in numbness symptoms  Functional change: none reported    Pain: 2/10  Location: left NA    Objective   /  Elbow and Wrist ROM. Measured in degrees.    8/13/2019 8/13/2019 09/03/2019 10/15/2019 10/15/2019     Right Left Left Right Left                         Wrist Ext/Flex 60/70 50/65 60/70 55/80 55/70   Wrist RD/UD 25/30 30/25 35/30 25/45 30/35             Strength (Dynamometer) and Pinch Strength (Pinch Gauge)  Measured in pounds.    8/13/2019 8/13/2019 09/03/2019 10/15/2019 10/15/2019     Right Left Left Right Left   Rung II 50 45 45 (=) pain in CMC 33 (-27) 40 (-5)   Scott Pinch 15 12 12 (=) 15 (=) 13(+1)   3pt Pinch 19 13 14 (+1) 16 (-3) 15 (+1)   2pt Pinch 10 8 9 (+1) 10 (=) 8 (-1)      Sensation : numbness reported at times, especially in dependent position      Manual Muscle Test    8/13/2019 8/13/2019 09/03/2019 10/15/2019 10/15/2019     Right Left Left " Right Left   Wrist Extension  5 4 5 5 5   Wrist Flexion 5 4 5 5 5   Radial Deviation 5 4 5 5 5   Ulnar Deviation 5 4 5 5 5          Melani received the following supervised modalities after being cleared for contradictions for 10 minutes:   -Patient received paraffin bath with moist heat pack to bilateral hands for 10 minutes to increase blood flow, circulation, pain management and for tissue elasticity prior to therex.          Melani received therapeutic exercises for 45 minutes including: Bilateral hands  -  Iastym  X 5 min to FA and palm left side only   Wave, hook, straight fist, composite fist, finger spreads, , pinky slides    X 10 reps each    Peach Theraputty X 3 min mold  X 10 squeezes  X 5 pancake/bloom  X 3 logs 3 pt pinch  X 3 logs lat pinch     Wrist PRE X 2# x 3 ways 20reps   Elbow PRE X 5# x 3 ways flexion x 10  X red theraband tricep ext x 20   Ulnar nerve glides X 10 reps see EMR   Prayer stretch X 30 sec x 3    Red flex bar  frowns/smiles x 10           Home Exercises and Education Provided     Education provided: Cont HEP 4 xday, stop doing the FCU stretches; elbow and yellow or peach theraputty for strengthening, added ulnar nerve glide, prayer stretch, rows with theraband (RED), ice and wear brace as much as possible to rest the wrist and hand. Add flex bar and self massage with lala massage tool at home.   - Progress towards goals     Written Home Exercises Provided: Patient instructed to cont prior HEP.  Exercises were reviewed and Melani was able to demonstrate them prior to the end of the session.  Melani demonstrated good  understanding of the education provided.   .   See EMR under Patient Instructions for exercises provided 08/27/2019.     Assessment   Pt. States left wrist is hurting today. She demonstrates fibours tissue in FA and palm, renetta at level if FCU. Pt. States she has been stretching more between dogs and that seems to help. She says the peach putty is helping at home as  well. She continues to report numbness about the RF and LF on the volar side. She states she forgot to tell the doctor about this. She complains that the wrist hurts when she is in full extension ( on the volar side)  And She states she has pain in the top of the wrist with full flexion of the wrist. She does not complain of pain during treatment sessions. Added flex bar with good participation this date.       Melani is progressing well towards her goals and there are no updates to goals at this time. Pt prognosis continues as Excellent. Pt will continue to benefit from skilled outpatient occupational therapy to address the deficits listed in the problem list on initial evaluation, provide pt/family education and to maximize pt's level of independence in the home and community environment.     Anticipated barriers to continued occupational therapy: low pain tolerance    Pt's spiritual, cultural and educational needs considered and pt agreeable to plan of care and goals.    Goals     Goals:   The following goals were discussed with the patient and patient is in agreement with them as to be addressed in the treatment plan.   Long Term Goals (LTGs); to be met by discharge.  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 -progressing  LTG #3: Pt will return to prior level of function for ADLs and household management.  -progressing     Short Term Goals (STGs); to be met within 4 weeks (09/13/2019).  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 -progressing  STG #2a: Pt will report/demo [place] with lifting small dogs. -progressing  STG #2b: Pt will report/demo [place] with grooming large dogs. -progressing  STG #3a: Pt will demonstrate independence with issued HEP.  -progressing  STG #3b: Pt will demo improved left  strength by 5# to increase strength for work adls.  -progressing    Plan     Continue skilled occupational therapy  with individualized plan of care focusing on increasing strength and function for daily and work occupations      Updates/Grading for next session: cont to progress as able    Lilliana Thorpe, OTR/L,CHT

## 2019-10-29 ENCOUNTER — CLINICAL SUPPORT (OUTPATIENT)
Dept: REHABILITATION | Facility: HOSPITAL | Age: 29
End: 2019-10-29
Attending: ORTHOPAEDIC SURGERY
Payer: MEDICAID

## 2019-10-29 DIAGNOSIS — M25.531 PAIN IN RIGHT WRIST: ICD-10-CM

## 2019-10-29 DIAGNOSIS — R29.898 WEAKNESS OF LEFT HAND: ICD-10-CM

## 2019-10-29 DIAGNOSIS — M25.532 PAIN IN LEFT WRIST: ICD-10-CM

## 2019-10-29 PROCEDURE — 97530 THERAPEUTIC ACTIVITIES: CPT | Mod: PN

## 2019-10-29 NOTE — PROGRESS NOTES
"  Occupational Therapy Daily Treatment Note      Date: 10/29/2019  Name: Melani Sloan  Clinic Number: [id number]    Therapy Diagnosis:   Encounter Diagnoses   Name Primary?    Pain in right wrist     Pain in left wrist     Weakness of left hand      Physician: Idalmis Waddell MD    Physician Orders: eval and treat  Medical Diagnosis: B FCU tendinitis  Date of Onset:  07/ 2019  Evaluation Date: 08/13/2019  Insurance Authorization Period Expiration: 11/22/2019   Plan of Care Certification Period: 09/28/2019-11/15/2019  Date of Return to MD: pt unsure     Visit # / Visits authorized: 11 / 16      FOTO: initial eval 41%, 5th Visit 31%        Time In:930aM  Time Out:1025aM  Total treatment time: 55 minutes  Total Timed minutes 55 minutes   TA x 4     Precautions:  Standard and seizures, vagal nerve stimulator    Subjective     Pt reports: " I got the thing you told me to get to massage. Can I use it on the back of my wrist too?  "    she was compliant with home exercise program given last session.   Response to previous treatment: increase in numbness symptoms  Functional change: none reported    Pain: 2/10  Location: left NA    Objective   /  Elbow and Wrist ROM. Measured in degrees.    8/13/2019 8/13/2019 09/03/2019 10/15/2019 10/15/2019     Right Left Left Right Left                         Wrist Ext/Flex 60/70 50/65 60/70 55/80 55/70   Wrist RD/UD 25/30 30/25 35/30 25/45 30/35             Strength (Dynamometer) and Pinch Strength (Pinch Gauge)  Measured in pounds.    8/13/2019 8/13/2019 09/03/2019 10/15/2019 10/15/2019     Right Left Left Right Left   Rung II 50 45 45 (=) pain in CMC 33 (-27) 40 (-5)   Scott Pinch 15 12 12 (=) 15 (=) 13(+1)   3pt Pinch 19 13 14 (+1) 16 (-3) 15 (+1)   2pt Pinch 10 8 9 (+1) 10 (=) 8 (-1)      Sensation : numbness reported at times, especially in dependent position      Manual Muscle Test    8/13/2019 8/13/2019 09/03/2019 10/15/2019 10/15/2019     Right Left Left Right Left "   Wrist Extension  5 4 5 5 5   Wrist Flexion 5 4 5 5 5   Radial Deviation 5 4 5 5 5   Ulnar Deviation 5 4 5 5 5          Melani received the following supervised modalities after being cleared for contradictions for 10 minutes:   -Patient received paraffin bath with moist heat pack to bilateral hands for 10 minutes to increase blood flow, circulation, pain management and for tissue elasticity prior to therex.          Melani received therapeutic exercises for 45 minutes including: Bilateral hands  -  Iastym  X 5 min to FA and palm left side only   Wave, hook, straight fist, composite fist, finger spreads, , pinky slides    X 10 reps each    Peach Theraputty X 3 min mold  X 10 squeezes  X 5 pancake/bloom  X 3 logs 3 pt pinch  X 3 logs lat pinch     Wrist PRE X 2# x 3 ways 20reps   Elbow PRE    Ulnar nerve glides X 10 reps see EMR   Prayer stretch X 30 sec x 3    Red flex bar  frowns/smiles x 10           Home Exercises and Education Provided     Education provided: Cont HEP 4 xday, stop doing the FCU stretches; elbow and yellow or peach theraputty for strengthening, added ulnar nerve glide, prayer stretch, rows with theraband (RED), ice and wear brace as much as possible to rest the wrist and hand. Add flex bar and self massage with lala massage tool at home.   - Progress towards goals     Written Home Exercises Provided: Patient instructed to cont prior HEP.  Exercises were reviewed and Melani was able to demonstrate them prior to the end of the session.  Melani demonstrated good  understanding of the education provided.   .   See EMR under Patient Instructions for exercises provided 08/27/2019.     Assessment   Pt. With less complaints of pain. She demonstrates fibours tissue in FA and palm, renetta at level if FCU. . She continues to report numbness about the RF and LF on the volar side- advised pt to talk to MD about this at next visit. She complains that the wrist hurts when she is in full extension ( on the  volar side)  And She states she has pain in the top of the wrist with full flexion of the wrist. She does not complain of pain during treatment sessions. Cont all exercises this date. .       Melani is progressing well towards her goals and there are no updates to goals at this time. Pt prognosis continues as Excellent. Pt will continue to benefit from skilled outpatient occupational therapy to address the deficits listed in the problem list on initial evaluation, provide pt/family education and to maximize pt's level of independence in the home and community environment.     Anticipated barriers to continued occupational therapy: low pain tolerance    Pt's spiritual, cultural and educational needs considered and pt agreeable to plan of care and goals.    Goals     Goals:   The following goals were discussed with the patient and patient is in agreement with them as to be addressed in the treatment plan.   Long Term Goals (LTGs); to be met by discharge.  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 -progressing  LTG #3: Pt will return to prior level of function for ADLs and household management.  -progressing     Short Term Goals (STGs); to be met within 4 weeks (09/13/2019).  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 -progressing  STG #2a: Pt will report/demo [place] with lifting small dogs. -progressing  STG #2b: Pt will report/demo [place] with grooming large dogs. -progressing  STG #3a: Pt will demonstrate independence with issued HEP.  -progressing  STG #3b: Pt will demo improved left  strength by 5# to increase strength for work adls. -progressing    Plan     Continue skilled occupational therapy  with individualized plan of care focusing on increasing strength and function for daily and work occupations      Updates/Grading for next session: cont to progress  as able    Lilliana Thorpe, OTR/L,CHT

## 2019-11-05 ENCOUNTER — CLINICAL SUPPORT (OUTPATIENT)
Dept: REHABILITATION | Facility: HOSPITAL | Age: 29
End: 2019-11-05
Attending: ORTHOPAEDIC SURGERY
Payer: MEDICAID

## 2019-11-05 DIAGNOSIS — M65.841 STENOSING TENOSYNOVITIS OF FINGER OF RIGHT HAND: Primary | ICD-10-CM

## 2019-11-05 DIAGNOSIS — R29.898 WEAKNESS OF LEFT HAND: ICD-10-CM

## 2019-11-05 DIAGNOSIS — M65.842 HORSESHOE TENOSYNOVITIS OF LEFT HAND: ICD-10-CM

## 2019-11-05 DIAGNOSIS — M25.531 PAIN IN RIGHT WRIST: ICD-10-CM

## 2019-11-05 DIAGNOSIS — M25.532 PAIN IN LEFT WRIST: ICD-10-CM

## 2019-11-05 PROCEDURE — 97530 THERAPEUTIC ACTIVITIES: CPT | Mod: PN

## 2019-11-05 NOTE — PROGRESS NOTES
"  Occupational Therapy Daily Treatment Note      Date: 11/5/2019  Name: Melani Sloan  Clinic Number: [id number]    Therapy Diagnosis:   Encounter Diagnoses   Name Primary?    Pain in right wrist     Pain in left wrist     Weakness of left hand      Physician: Idalmis Waddell MD    Physician Orders: eval and treat  Medical Diagnosis: B FCU tendinitis  Date of Onset:  07/ 2019  Evaluation Date: 08/13/2019  Insurance Authorization Period Expiration: 11/22/2019   Plan of Care Certification Period: 09/28/2019-11/15/2019  Date of Return to MD: pt unsure     Visit # / Visits authorized: 12 / 16      FOTO: initial eval 41%, 5th Visit 31%        Time In:930am  Time Out:1030am  Total treatment time: 60 minutes  Total Timed minutes 60 minutes   TA x 4     Precautions:  Standard and seizures, vagal nerve stimulator    Subjective     Pt reports: " The nurse at the doctor's office said I don't need to continue after my next session. "  "    she was compliant with home exercise program given last session.   Response to previous treatment: increase in numbness symptoms  Functional change: none reported    Pain: 2/10  Location: left NA    Objective   /  Elbow and Wrist ROM. Measured in degrees.    8/13/2019 8/13/2019 09/03/2019 10/15/2019 10/15/2019     Right Left Left Right Left                         Wrist Ext/Flex 60/70 50/65 60/70 55/80 55/70   Wrist RD/UD 25/30 30/25 35/30 25/45 30/35             Strength (Dynamometer) and Pinch Strength (Pinch Gauge)  Measured in pounds.    8/13/2019 8/13/2019 09/03/2019 10/15/2019 10/15/2019     Right Left Left Right Left   Rung II 50 45 45 (=) pain in CMC 33 (-27) 40 (-5)   Scott Pinch 15 12 12 (=) 15 (=) 13(+1)   3pt Pinch 19 13 14 (+1) 16 (-3) 15 (+1)   2pt Pinch 10 8 9 (+1) 10 (=) 8 (-1)      Sensation : numbness reported at times, especially in dependent position      Manual Muscle Test    8/13/2019 8/13/2019 09/03/2019 10/15/2019 10/15/2019     Right Left Left Right Left   Wrist " Extension  5 4 5 5 5   Wrist Flexion 5 4 5 5 5   Radial Deviation 5 4 5 5 5   Ulnar Deviation 5 4 5 5 5          Melani received the following supervised modalities after being cleared for contradictions for 10 minutes:   -Patient received paraffin bath with moist heat pack to bilateral hands for 10 minutes to increase blood flow, circulation, pain management and for tissue elasticity prior to therex.          Melani received therapeutic exercises for 45 minutes including: Bilateral hands  -  Iastym  X 5 min to FA and palm left side only   Wave, hook, straight fist, composite fist, finger spreads, , pinky slides    X 10 reps each    Peach Theraputty X 3 min mold  X 10 squeezes  X 5 pancake/bloom  X 3 logs 3 pt pinch  X 3 logs lat pinch     Wrist PRE X 2# x 3 ways 20reps   Elbow PRE X 5 # x 3 ways x 10   Ulnar nerve glides X 10 reps see EMR   Prayer stretch X 30 sec x 3    Red flex bar  frowns/smiles x 10           Home Exercises and Education Provided     Education provided: Cont HEP 4 xday, stop doing the FCU stretches; elbow and yellow or peach theraputty for strengthening, added ulnar nerve glide, prayer stretch, rows with theraband (RED), ice and wear brace as much as possible to rest the wrist and hand. Add flex bar and self massage with lala massage tool at home.   - Progress towards goals     Written Home Exercises Provided: Patient instructed to cont prior HEP.  Exercises were reviewed and Melani was able to demonstrate them prior to the end of the session.  Melani demonstrated good  understanding of the education provided.   .   See EMR under Patient Instructions for exercises provided 08/27/2019.     Assessment   Pt. With complaints of pain increase on left side about thenar area and volar wrist. She demonstrates fibours tissue in FA and palm, renetta at level if FCU. . She continues to report numbness about the RF and LF on the volar side- advised pt to talk to MD about this at next visit. She complains  that the wrist hurts when she is in full extension ( on the volar side)  And She states she has pain in the top of the wrist with full flexion of the wrist. She does not complain of pain during treatment sessions. Cont all exercises this date. Provided pt with handout for all previous HEP per pt request.        Melani is progressing well towards her goals and there are no updates to goals at this time. Pt prognosis continues as Excellent. Pt will continue to benefit from skilled outpatient occupational therapy to address the deficits listed in the problem list on initial evaluation, provide pt/family education and to maximize pt's level of independence in the home and community environment.     Anticipated barriers to continued occupational therapy: low pain tolerance    Pt's spiritual, cultural and educational needs considered and pt agreeable to plan of care and goals.    Goals     Goals:   The following goals were discussed with the patient and patient is in agreement with them as to be addressed in the treatment plan.   Long Term Goals (LTGs); to be met by discharge.  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 -progressing  LTG #3: Pt will return to prior level of function for ADLs and household management.  -progressing     Short Term Goals (STGs); to be met within 4 weeks (09/13/2019).  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 -progressing  STG #2a: Pt will report/demo [place] with lifting small dogs. -progressing  STG #2b: Pt will report/demo [place] with grooming large dogs. -progressing  STG #3a: Pt will demonstrate independence with issued HEP.  -progressing  STG #3b: Pt will demo improved left  strength by 5# to increase strength for work adls. -progressing    Plan     Continue skilled occupational therapy  with individualized plan of care focusing on  increasing strength and function for daily and work occupations      Updates/Grading for next session: cont to progress as able    Lilliana Thorpe, OTR/L,CHT

## 2019-11-19 ENCOUNTER — CLINICAL SUPPORT (OUTPATIENT)
Dept: REHABILITATION | Facility: HOSPITAL | Age: 29
End: 2019-11-19
Attending: ORTHOPAEDIC SURGERY
Payer: MEDICAID

## 2019-11-19 DIAGNOSIS — M25.532 PAIN IN LEFT WRIST: ICD-10-CM

## 2019-11-19 DIAGNOSIS — M25.531 PAIN IN RIGHT WRIST: ICD-10-CM

## 2019-11-19 DIAGNOSIS — R29.898 WEAKNESS OF LEFT HAND: ICD-10-CM

## 2019-11-19 PROCEDURE — 97530 THERAPEUTIC ACTIVITIES: CPT | Mod: PN

## 2019-11-19 NOTE — PROGRESS NOTES
"  Occupational Therapy Daily Treatment Note / DISCHARGE SUMMARY     Date: 11/19/2019  Name: Melani Sloan  Clinic Number: [id number]    Therapy Diagnosis:   Encounter Diagnoses   Name Primary?    Pain in right wrist     Pain in left wrist     Weakness of left hand      Physician: Idalmis Waddell MD    Physician Orders: eval and treat  Medical Diagnosis: B FCU tendinitis  Date of Onset:  07/ 2019  Evaluation Date: 08/13/2019  Insurance Authorization Period Expiration: 11/22/2019   Plan of Care Certification Period: 09/28/2019-11/15/2019  Date of Return to MD: pt unsure     Visit # / Visits authorized: 13 / 16      FOTO: initial eval 41%, 5th Visit 31%, 13th visit        Time In:130pm  Time Out:230pm  Total treatment time: 60 minutes  Total Timed minutes 60 minutes   TA x 4     Precautions:  Standard and seizures, vagal nerve stimulator    Subjective     Pt reports: " I need new straps for my splint on the left. "  "    she was compliant with home exercise program given last session.   Response to previous treatment: increase in numbness symptoms  Functional change: none reported    Pain: 2/10  Location: left NA    Objective   /  Elbow and Wrist ROM. Measured in degrees.    8/13/2019 8/13/2019 09/03/2019 10/15/2019 10/15/2019 11/19/2019 11/19/2019     Right Left Left Right Left Right Left                             Wrist Ext/Flex 60/70 50/65 60/70 (=/+5) 55/80 (+5/+10) 55/70 (+5/=) 70/50 (+15/-30) 60/60 (+5/-10)   Wrist RD/UD 25/30 30/25 35/30 (+5/+5) 25/45  (=+15) 30/35 (+5/-5) 25/45 (=) 30/25 (=/-10)             Strength (Dynamometer) and Pinch Strength (Pinch Gauge)  Measured in pounds.    8/13/2019 8/13/2019 09/03/2019 10/15/2019 10/15/2019 11/19/2019 11/19/2019     Right Left Left Right Left Right Left   Rung II 50 45 45 (=) pain in CMC 33 (-27) 40 (-5) 32 (-1) 26 (-14)   Scott Pinch 15 12 12 (=) 15 (=) 13(+1) 13 (-2) 15 (-2)   3pt Pinch 19 13 14 (+1) 16 (-3) 15 (+1) 12 (-4) 11 (-4)   2pt Pinch 10 8 9 (+1) " 10 (=) 8 (-1) 9 (-1) 8 (=)      Sensation : numbness reported at times, especially in dependent position      Manual Muscle Test    8/13/2019 8/13/2019 09/03/2019 10/15/2019 10/15/2019 11/19/2019 11/19/2019     Right Left Left Right Left Right Left   Wrist Extension  5 4 5 5 5 5 5   Wrist Flexion 5 4 5 5 5 5 5   Radial Deviation 5 4 5 5 5 5 5   Ulnar Deviation 5 4 5 5 5 5 5          Melani received the following supervised modalities after being cleared for contradictions for 10 minutes:   -Patient received paraffin bath with moist heat pack to bilateral hands for 10 minutes to increase blood flow, circulation, pain management and for tissue elasticity prior to therex.          Melani received therapeutic exercises for 45 minutes including: Bilateral hands  -  Iastym  X 5 min to FA and palm left side only   Wave, hook, straight fist, composite fist, finger spreads, , pinky slides    X 10 reps each    Peach Theraputty X 3 min mold  X 10 squeezes  X 5 pancake/bloom  X 3 logs 3 pt pinch  X 3 logs lat pinch     Wrist PRE X 2# x 3 ways 20reps   Elbow PRE X 5 # x 3 ways x 10   Ulnar nerve glides X 10 reps see EMR   Prayer stretch X 30 sec x 3    Red flex bar  frowns/smiles x 10           Home Exercises and Education Provided     Education provided: Cont HEP 4 xday, stop doing the FCU stretches; elbow and yellow or peach theraputty for strengthening, added ulnar nerve glide, prayer stretch, rows with theraband (RED), ice and wear brace as much as possible to rest the wrist and hand. Add flex bar and self massage with lala massage tool at home.   - Progress towards goals     Written Home Exercises Provided: Patient instructed to cont prior HEP.  Exercises were reviewed and Melani was able to demonstrate them prior to the end of the session.  Melani demonstrated good  understanding of the education provided.   .   See EMR under Patient Instructions for exercises provided 08/27/2019.     Assessment   Reassessment with  continued losses of  strength and pinch. Inconsistent AROM meausrements as well. Pt. Appears to not be giving best effort. Pt. With complaints of pain increase on left side about thenar area and volar wrist. She demonstrates fibours tissue in FA and palm, renetta at level if FCU. . She continues to report numbness about the RF and LF on the volar side- advised pt to talk to MD about this at next visit. She complains that the wrist hurts when she is in full extension ( on the volar side)  And She states she has pain in the top of the wrist with full flexion of the wrist. Today she complains of pain in the left wrist after session and requested ice.   Pt. Requested ice today stating her hand was really hurting after the session.      Anticipated barriers to continued occupational therapy: low pain tolerance    Pt's spiritual, cultural and educational needs considered and pt agreeable to plan of care and goals.    Goals     Goals:   The following goals were discussed with the patient and patient is in agreement with them as to be addressed in the treatment plan.   Long Term Goals (LTGs); to be met by discharge.  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 -progressing  LTG #3: Pt will return to prior level of function for ADLs and household management.  -progressing     Short Term Goals (STGs); to be met within 4 weeks (09/13/2019).  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 -progressing  STG #2a: Pt will report/demo [place] with lifting small dogs. -progressing  STG #2b: Pt will report/demo [place] with grooming large dogs. -progressing  STG #3a: Pt will demonstrate independence with issued HEP.  -progressing  STG #3b: Pt will demo improved left  strength by 5# to increase strength for work adls. -progressing    Plan     DC to HEP this date    Lilliana Thorpe  OTR/L,CHT

## 2019-11-20 ENCOUNTER — DOCUMENTATION ONLY (OUTPATIENT)
Dept: REHABILITATION | Facility: HOSPITAL | Age: 29
End: 2019-11-20

## 2019-11-20 DIAGNOSIS — M25.531 PAIN IN RIGHT WRIST: ICD-10-CM

## 2019-11-20 DIAGNOSIS — R29.898 WEAKNESS OF LEFT HAND: ICD-10-CM

## 2019-11-20 DIAGNOSIS — M25.532 PAIN IN LEFT WRIST: ICD-10-CM

## 2019-11-20 NOTE — PROGRESS NOTES
OCCUPATIONAL THERAPY RETURN TO DOCTOR PROGRESS NOTE            Patient: Melani Sloan  MRN: [id number]  Date of Evaluation: 08/13/2019  Referring Physician:  Dr. Waddell  Next MD visit: pt to schedule  Primary Diagnosis: bilateral FCU tendinits  Treatment Diagnosis:   1. Pain in right wrist     2. Pain in left wrist     3. Weakness of left hand               TOTAL VISITS: 13    Pt. Continues to present with inconsistent symptoms. She states pain and numbness in the left hand. She also states she has pain in the thenar area. 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 She is inconsistent with her objective measurments. She has not complained of pain after a session until today- her d/c session. She does not appear to be giving her best effort this date with  strength or range of motion measurements. At this point I do not feel that additional therapy will benefit her. She demonstrates some hypermobility of elbows, wrists, and fingers during sessions.     She has been discharged this date from skilled OT services.     Objective:     Elbow and Wrist ROM. Measured in degrees.    8/13/2019 8/13/2019 09/03/2019 10/15/2019 10/15/2019 11/19/2019 11/19/2019     Right Left Left Right Left Right Left                                       Wrist Ext/Flex 60/70 50/65 60/70 (=/+5) 55/80 (+5/+10) 55/70 (+5/=) 70/50 (+15/-30) 60/60 (+5/-10)   Wrist RD/UD 25/30 30/25 35/30 (+5/+5) 25/45  (=+15) 30/35 (+5/-5) 25/45 (=) 30/25 (=/-10)             Strength (Dynamometer) and Pinch Strength (Pinch Gauge)  Measured in pounds.    8/13/2019 8/13/2019 09/03/2019 10/15/2019 10/15/2019 11/19/2019 11/19/2019     Right Left Left Right Left Right Left   Rung II 50 45 45 (=) pain in CMC 33 (-27) 40 (-5) 32 (-1) 26 (-14)   Scott Pinch 15 12 12 (=) 15 (=) 13(+1) 13 (-2) 15 (-2)   3pt Pinch 19 13 14 (+1) 16 (-3) 15  (+1) 12 (-4) 11 (-4)   2pt Pinch 10 8 9 (+1) 10 (=) 8 (-1) 9 (-1) 8 (=)      Sensation : numbness reported at times, especially in dependent position          SID Wei, OTR/L, CHT   Occupational therapist, Certified Hand Therapist  [facility]  [phone] phone  [phone] fax

## 2020-02-14 ENCOUNTER — CLINICAL SUPPORT (OUTPATIENT)
Dept: REHABILITATION | Facility: HOSPITAL | Age: 30
End: 2020-02-14
Payer: MEDICAID

## 2020-02-14 DIAGNOSIS — R29.898 WEAKNESS OF BOTH HANDS: ICD-10-CM

## 2020-02-14 DIAGNOSIS — M79.641 PAIN IN BOTH HANDS: ICD-10-CM

## 2020-02-14 DIAGNOSIS — M79.642 PAIN IN BOTH HANDS: ICD-10-CM

## 2020-02-14 PROCEDURE — 97165 OT EVAL LOW COMPLEX 30 MIN: CPT | Mod: PN

## 2020-02-14 NOTE — PLAN OF CARE
[facility] Occupational Therapy  Initial Evaluation     Date: 2/14/2020  Name: Melani Sloan  Clinic Number: [id number]    Therapy Diagnosis:   Encounter Diagnoses   Name Primary?    Pain in both hands     Weakness of both hands      Physician: Idalmis Waddell MD    Physician Orders: eval and treat focus mostly on the left wrist as it is post operative  Medical Diagnosis: Left carpal tunnel release and right wrist tendinitis  Surgical Procedure and Date: 01/24/2020, Carpal tunnel release  Evaluation Date: 02/14/2020  Insurance Authorization Period Expiration: TBD  Plan of Care Certification Period: 02/14/2020-03/28/2020  Date of Return to MD: Pt. unsure    Visit # / Visits authorized: 1 / TBD    FOTO: initial eval      Time In:11am  Time Out: 12pm  Total treatment time: 60minutes  Total Timed minutes0 minutes    Precautions:  Standard strengthening at 5-6 weeks post op    Subjective     Involved Side: Bilateral   Dominant Side: Right  Date of Onset: >6 months   Mechanism of Injury: over use  History of Current Condition:  She has tendinitis in the right hand per MD. Per MD verbal- focus on left hand surgical hand. She states she has shooting pain and numbness in the left hand and feels the surgery is worse than it was on the right in 2018. She said the MD said the shooting pain was normal.  Pt. States the right hand doesn't hurt her or bother her but she is anticipating it hurting so that is why she wants therapy on it. Pt. States she is in 10/10 pain all day long.   Surgical Procedure: as above  Imaging: at OSH  Previous Therapy: Pt has been seen for 3 rounds of therapy on the right including prior to surgery at other facility. She has been treated for 1 round of therapy on the left prior to this surgery. This will be the 4th time treating the right hand for the same diagnosis.     Past Medical History/Physical Systems Review:   Melani Sloan  has a past medical history of  Seizures.    Melani Sloan  has a past surgical history that includes Vagus nerve stimulator insertion.    Melani has a current medication list which includes the following prescription(s): brivaracetam, brivaracetam, felbamate, hydrocodone-acetaminophen, naproxen, and zonisamide.    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     Current FunctionalStatus   Home/Living environment : lives with their family      Limitation of Functional Status as follows:   ADLs/IADLs:     - Feeding: no difficulty    - Bathing: can't use left hand    - Dressing/Grooming: un dressing is difficult with the left hand    - Driving: can't use for right hand     Leisure: n/a        Objective     Observation/Appearance: well healed incision with scabs present    Edema. Measured in centimeters.   2/14/2020 2/14/2020    Right Left   Wrist Crease 15.2 16.4   MCPs 18.5 18.5           Elbow and Wrist ROM. Measured in degrees.   2/14/2020 2/14/2020    Right Left             Wrist Ext/Flex 55/80 65/75 *pain reported   Wrist RD/UD 20/25 16/30     Hand ROM. Measured in degrees.   2/14/2020 2/14/2020    Right Left             Thumb: MP 65 69                IP 0 17       Rad ADD/ABD 40 40       Pal ADD/ABD 45 45          Opposition SF MP crease  SF MPcrease      Strength (Dynamometer) and Pinch Strength (Pinch Gauge)  Measured in pounds.   2/14/2020 2/14/2020    Right Left   Rung II 42 7   Scott Pinch 12 5   3pt Pinch 13 5     Sensation : shooting pain up the arm and numbness in the LF on left            CMS Impairment/Limitation/Restriction for FOTO hand Survey    Therapist reviewed FOTO scores for Melani Sloan on 2/14/2020.   FOTO documents entered into [facility] - see Media section.    Limitation Score: 69%         Treatment     Treatment Time In: 1135am  Treatment Time Out: 1155am  Total Treatment time separate from Evaluation time:20 min    Melani received the following supervised modalities after being cleared for contradictions for 10 minutes:   Fluidotherapy: To  Left arm with tega, continuous air, 115 deg, air speed 50 to decrease pain, edema & scar tissue, sensory re- education, and increased tissue extensibility prior to therex      Melani received the following manual therapy techniques for 2 minutes:  (brief instruction for HEP this date)  --Performed Retrograde massage to left fingers/hand/wrist to decrease edema, improve joint mobility, decrease pain and improve lymphatic drainage to increase hand function.   -Performed scar massage to healed incision  to decrease adhesions and improve tensile glide.       Melani received therapeutic exercises for 8 minutes including:  -  AROM Wrist  Ext/flx  RD/UD   X 10 reps each    Wave, hook, straight fist, composite fist, finger spreads, finger lifts, pinky slides   X 10 reps each          Home Exercise Program/Education:  Issued HEP (see patient instructions in EMR) and educated on modality use for pain management . Exercises were reviewed and Melani was able to demonstrate them prior to the end of the session.   Pt received a written copy of exercises to perform at home. Melani demonstrated good  understanding of the education provided.  Pt was advised to perform these exercises free of pain, and  to stop performing them if pain occurs.    Patient/Family Education: role of OT, goals for OT, scheduling/cancellations - pt verbalized understanding. Discussed insurance limitations with patient.    Additional Education provided: Educated pt that we will not be treating her Right hand with each session due to pt not having any deficits reported in the right hand and pt wanting treatment for prevention. Pt. Is independent in HEP for right hand. Instructed pt and provided printed handout for HEP for right hand as well as additional theraputty this date- Pt to use on RIGHT HAND ONLY, not theraputty or strengthening on LEFT HAND. Educated pt on purchasing off the shelf orthosis for left hand at this time due to custom orthotic not fitting from post op swelling.Pt. Verbalized understanding.  Pt. Also instructed to go to ER if 10/10 pain continues due to this being a very high pain rating.     Assessment     Melani Sloan is a 30 y.o. female referred to outpatient occupational therapy and presents with a medical diagnosis of left carpal tunnel release and right hand tendinitis, resulting in 10/10 pain in the left hand, reporting that she does not have pain or difficulty with the right hand and just thinks it will start hurting due to having surgery on the left hand and demonstrates limitations as described in the chart below. Following medical record review it is determined that pt will benefit from occupational therapy services in order to maximize pain free and/or functional use of left hand. The following goals were discussed with the patient and patient is in agreement with them as to be addressed in the treatment plan. The patient's rehab potential is Fair.     Anticipated barriers to occupational therapy: chronic pain pt with high pain level  Pt has no cultural, educational or language barriers to learning provided.    Profile and History Assessment of Occupational Performance Level of Clinical Decision Making  Complexity Score   Occupational Profile:   Melani Sloan is a 30 y.o. female who lives with their family and is currently self-employed but not working as as . Melani Sloan has difficulty with  dressing  driving/transportation management, shopping and housework/household chores  affecting his/her daily functional abilities. His/her main goal for therapy is to decrease pain.     Comorbidities:   Past Medical History:   Diagnosis Date    Seizures    Arthritis  Back pain  Vagal nerve stimulator    Medical and Therapy History Review:   Brief               Performance Deficits    Physical:  Muscle Power/Strength  Muscle Endurance  Skin Integrity/Scar Formation  Edema   Strength  Pinch Strength  Gross Motor Coordination  Fine Motor Coordination  Pain    Cognitive:  Communication  Safety Awareness/Insight to Disability    Psychosocial:    No Deficits     Clinical Decision Making:  low    Assessment Process:  Problem-Focused Assessments    Modification/Need for Assistance:  Not Necessary    Intervention Selection:  Limited Treatment Options       low  Based on PMHX, co morbidities , data from assessments and functional level of assistance required with task and clinical presentation directly impacting function.         Goals:   The following goals were discussed with the patient and patient is in agreement with them as to be addressed in the treatment plan.   Long Term Goals (LTGs); to be met by discharge.  LTG #1: Pt will report a pain level of 4 out of 10 with ADLs   LTG #2: Pt will demo improved FOTO score by 20 points.   LTG #3: Pt will return to prior level of function for ADLs and household management.     Short Term Goals (STGs); to be met within 4 weeks (03/13/2020).  STG #1a: Pt will report 6 out of 10 pain level with ADLs.  STG #2a: Pt will report/demo [place] with opening car door with left hand.  STG #2b: Pt will report/demo [place] with dressing/undressing with left hand.  STG  #3a: Pt will demonstrate independence with issued HEP.   STG #3b: Pt will demo improved  strength by 5 # in order to increase strength for return to work tasks.        Plan   Certification Period/Plan of care expiration: 2/14/2020 to 03/28/2020.        Outpatient Occupational Therapy 3 times weekly for 6 weeks  (per MD order) to include the following interventions: Paraffin, Fluidotherapy, Manual therapy/joint mobilizations, Modalities for pain management, US 3 mhz, Therapeutic exercises/activities., Strengthening, Orthotic Fabrication/Fit/Training, Edema Control and Scar Management.      Lilliana Thorpe, OTR/L,CHT

## 2020-02-14 NOTE — PATIENT INSTRUCTIONS
[facility] [place]  [phone]  SID MCMILLAN, OTR/L, CHT  OCCUPATIONAL THERAPIST, CERTIFIED HAND THERAPIST        .hold x 30 sec, 3 x

## 2020-02-19 ENCOUNTER — CLINICAL SUPPORT (OUTPATIENT)
Dept: REHABILITATION | Facility: HOSPITAL | Age: 30
End: 2020-02-19
Attending: FAMILY MEDICINE
Payer: MEDICAID

## 2020-02-19 DIAGNOSIS — R29.898 WEAKNESS OF BOTH HANDS: ICD-10-CM

## 2020-02-19 DIAGNOSIS — M79.641 PAIN IN BOTH HANDS: ICD-10-CM

## 2020-02-19 DIAGNOSIS — M79.642 PAIN IN BOTH HANDS: ICD-10-CM

## 2020-02-19 PROCEDURE — 97530 THERAPEUTIC ACTIVITIES: CPT | Mod: PN

## 2020-02-19 NOTE — PROGRESS NOTES
"  Occupational Therapy Daily Treatment Note      Date: 2/19/2020  Name: Melani Sloan  Clinic Number: [id number]    Therapy Diagnosis:   Encounter Diagnoses   Name Primary?    Pain in both hands     Weakness of both hands      Physician: Dr. Idamlis Waddell    Physician Orders: eval and treat focus mostly on the left wrist as it is post operative  Medical Diagnosis: Left carpal tunnel release and right wrist tendinitis  Surgical Procedure and Date: 01/24/2020, Carpal tunnel release  Evaluation Date: 02/14/2020  Insurance Authorization Period Expiration: 04/17/2020  Plan of Care Certification Period: 02/14/2020-03/28/2020  Date of Return to MD: Pt. unsure     Visit # / Visits authorized: 2 / 17     FOTO: initial eval        Time In:11am  Time Out: 1158pm  Total treatment time: 58minutes  Total Timed minutes 53minutes TX x 4     Precautions:  Standard strengthening at 5-6 weeks post op (02/28/2020 = 5 weeks)    Subjective     Pt reports: " It still hurts. I think this surgery was worse. It hurts in my thumb and it wasn't hurting there before surgery"   she was compliant with home exercise program given last session.   Response to previous treatment:no change  Functional change: no change    Pain: 10/10- outward appearance does not match verbal pain score  Location: left arm  And hand    Objective       Edema. Measured in centimeters.    2/14/2020 2/14/2020     Right Left   Wrist Crease 15.2 16.4   MCPs 18.5 18.5               Elbow and Wrist ROM. Measured in degrees.    2/14/2020 2/14/2020     Right Left                   Wrist Ext/Flex 55/80 65/75 *pain reported   Wrist RD/UD 20/25 16/30      Hand ROM. Measured in degrees.    2/14/2020 2/14/2020     Right Left                   Thumb: MP 65 69                IP 0 17       Rad ADD/ABD 40 40       Pal ADD/ABD 45 45          Opposition SF MP crease SF MPcrease       Strength (Dynamometer) and Pinch Strength (Pinch Gauge)  Measured in pounds.    2/14/2020 2/14/2020     " Right Left   Rung II 42 7   Scott Pinch 12 5   3pt Pinch 13 5      Sensation : shooting pain up the arm and numbness in the LF on left     Melani received the following neuromuscular re-education after being cleared for contradictions for 15 minutes:   Fluidotherapy: To  Left arm continuous air, 115 deg, air speed 50 to decrease pain, edema & scar tissue, sensory re- education, and increased tissue extensibility prior to therex        Melani received the following manual therapy techniques for 10 minutes:    --Performed Retrograde massage to left fingers/hand/wrist to decrease edema, improve joint mobility, decrease pain and improve lymphatic drainage to increase hand function.   -Performed scar massage to healed incision  to decrease adhesions and improve tensile glide.         Melani received therapeutic exercises for 25  minutes including:  -  AROM Wrist  Ext/flx  RD/UD    X 10 reps each    Wave, hook, straight fist, composite fist, finger spreads, finger lifts, pinky slides        X 10 reps each    Prayer stretch X 30 sec x 3   Wrist wheel X 3 min flex/ext   Wrist Maze X 3 min   In hand manipulation X 3 containers medium pom poms   Wrist AROM over wedge X 3/10 x 3 ways with medium undweighted dowel mumtaz       Patient received cold pack x 5 minutes to decrease pain/inflammation and edema following treatment session.         Home Exercises and Education Provided     Education provided: Cont HEP, can stretch and massage wrist to help with tightness  - Progress towards goals     Written Home Exercises Provided: Patient instructed to cont prior HEP.  Exercises were reviewed and Melani was able to demonstrate them prior to the end of the session.  Melani demonstrated good  understanding of the education provided.   .   See EMR under Media for exercises provided initial eval.     Assessment   Pt. States she is unable to wear the brace at night because she feels like there is a rock on her hand. She states she has  10/10 pain but is laughing and talking during therex and therefore verbal pain rating is not consistent with physical appearance. She is also able to complete all activities with rest break required for wrist wheel exercise but none of the other exercises.  Pt. States she feels tightness all in the hand and wrist. She states he thumb hurts at the base. She states she feels this surgery was different than the right side, however per medical chart review she had the same surgery on the right side. Pt. Requests ice but states she doesn't feel that the ice is taking her pain away today.     Melani is not progressing well towards her goals and there are no updates to goals at this time. Pt prognosis continues as Fair. Pt will continue to benefit from skilled outpatient occupational therapy to address the deficits listed in the problem list on initial evaluation, provide pt/family education and to maximize pt's level of independence in the home and community environment.     Anticipated barriers to continued occupational therapy: high pain reports, pt's understanding of pain levels    Pt's spiritual, cultural and educational needs considered and pt agreeable to plan of care and goals.    Goals   Goals:   The following goals were discussed with the patient and patient is in agreement with them as to be addressed in the treatment plan.   Long Term Goals (LTGs); to be met by discharge.  LTG #1: Pt will report a pain level of 4 out of 10 with ADLs -progressing  LTG #2: Pt will demo improved FOTO score by 20 points. -progressing  LTG #3: Pt will return to prior level of function for ADLs and household management. -progressing     Short Term Goals (STGs); to be met within 4 weeks (03/13/2020).  STG #1a: Pt will report 6 out of 10 pain level with ADLs.-progressing  STG #2a: Pt will report/demo [place] with opening car door with left hand.-progressing  STG #2b: Pt will report/demo [place] with dressing/undressing with  left hand.-progressing  STG #3a: Pt will demonstrate independence with issued HEP-progressing.   STG #3b: Pt will demo improved  strength by 5 # in order to increase strength for return to work tasks.-progressing    Plan     Continue skilled occupational therapy with individualized plan of care focusing on return to function and pain relief.       Updates/Grading for next session: cont to progress as able, no strengthening until 02/28/2020    Lilliana Thorpe, OTR/L,CHT

## 2020-02-28 ENCOUNTER — CLINICAL SUPPORT (OUTPATIENT)
Dept: REHABILITATION | Facility: HOSPITAL | Age: 30
End: 2020-02-28
Payer: MEDICAID

## 2020-02-28 DIAGNOSIS — M79.641 PAIN IN BOTH HANDS: ICD-10-CM

## 2020-02-28 DIAGNOSIS — M79.642 PAIN IN BOTH HANDS: ICD-10-CM

## 2020-02-28 DIAGNOSIS — R29.898 WEAKNESS OF BOTH HANDS: ICD-10-CM

## 2020-02-28 PROCEDURE — 97530 THERAPEUTIC ACTIVITIES: CPT | Mod: PN

## 2020-02-28 NOTE — PATIENT INSTRUCTIONS
[facility] [place]  [phone]  JUSTINO CAPELLAN OTEUGENIE, OTR/L, CHT  OCCUPATIONAL THERAPIST, CERTIFIED HAND THERAPIST

## 2020-02-28 NOTE — PROGRESS NOTES
"  Occupational Therapy Daily Treatment Note      Date: 2/28/2020  Name: Melani Sloan  Clinic Number: [id number]    Therapy Diagnosis:   Encounter Diagnoses   Name Primary?    Pain in both hands     Weakness of both hands      Physician: Dr. Idalmis Waddell    Physician Orders: eval and treat focus mostly on the left wrist as it is post operative  Medical Diagnosis: Left carpal tunnel release and right wrist tendinitis  Surgical Procedure and Date: 01/24/2020, Carpal tunnel release  Evaluation Date: 02/14/2020  Insurance Authorization Period Expiration: 04/17/2020  Plan of Care Certification Period: 02/14/2020-03/28/2020- extended to 04/17/2020 due to pt being out of town for a week.    Date of Return to MD: 03/03/2020 @1130     Visit # / Visits authorized: 3 / 17     FOTO: initial eval        Time In:955am  Time Out: 1055pm  Total treatment time: 60minutes  Total Timed minutes 55minutes TX x 4     Precautions:  Standard strengthening at 5-6 weeks post op (02/28/2020 = 5 weeks)    Subjective     Pt reports: "It is feeling better but I don't feel a big difference in the scar." Pt states she has not been doing the scar massage deeply in the scar -" I didn't know I was supposed to"  " Can I do the wax next time, I feel it helps more with my hand, I'm not sure why"   she was compliant with home exercise program given last session.   Response to previous treatment:no change  Functional change: no change    Pain: 7-8/10  Location: left arm  And hand    Objective       Edema. Measured in centimeters.    2/14/2020 2/14/2020     Right Left   Wrist Crease 15.2 16.4   MCPs 18.5 18.5               Elbow and Wrist ROM. Measured in degrees.    2/14/2020 2/14/2020     Right Left                   Wrist Ext/Flex 55/80 65/75 *pain reported   Wrist RD/UD 20/25 16/30      Hand ROM. Measured in degrees.    2/14/2020 2/14/2020     Right Left                   Thumb: MP 65 69                IP 0 17       Rad ADD/ABD 40 40       Pal ADD/ABD " 45 45          Opposition SF MP crease SF MPcrease       Strength (Dynamometer) and Pinch Strength (Pinch Gauge)  Measured in pounds.    2/14/2020 2/14/2020     Right Left   Rung II 42 7   Scott Pinch 12 5   3pt Pinch 13 5      Sensation : shooting pain up the arm and numbness in the LF on left     Melani received the following neuromuscular re-education after being cleared for contradictions for 15 minutes:   Fluidotherapy: To  Left arm continuous air, 115 deg, air speed 50 to decrease pain, edema & scar tissue, sensory re- education, and increased tissue extensibility prior to therex        Melani received the following manual therapy techniques for 10 minutes:    --Performed Retrograde massage to left fingers/hand/wrist to decrease edema, improve joint mobility, decrease pain and improve lymphatic drainage to increase hand function.   -Performed scar massage to incision  to decrease adhesions and improve tensile glide.         Melani received therapeutic exercises for 30  minutes including:  -  AROM Wrist  Ext/flx  RD/UD    X 10 reps each    Wave, hook, straight fist, composite fist, finger spreads, finger lifts, pinky slides        X 10 reps each    Prayer stretch X 30 sec x 3   Wrist Maze X 3 min   In hand manipulation X 3 containers medium pom poms   Wrist PRE X 3/10 x 1 # 3 ways   Yellow Putty X 3 min  X 10 squeezes  X 5 pancake/blooms  X 3 logs 3 pt pinch  X 3 logs lat pinch       Patient received cold pack x 5 minutes to decrease pain/inflammation and edema following treatment session.         Home Exercises and Education Provided     Education provided: Cont HEP, can stretch and massage wrist to help with tightness, increase scar massage, prayer stretch lighter to not increase pain. Yellow theraputty 3 x week NOT EVERYDAY  - Progress towards goals     Written Home Exercises Provided: yes.  Exercises were reviewed and Melani was able to demonstrate them prior to the end of the session.  Melani  demonstrated good  understanding of the education provided.   .   See EMR under Media for exercises provided 02/28/2020.     Assessment     Pt. States she feels it is a little better. She states she has an MD appt on 03/03/2020  To discuss with pain. She is having shooting pains in the wrist. Therapist advised that this is normal nerve healing and it will decrease.Started slow progressive strengthening this date with 1# weight and yellow theraputty..She states pain in the wrist after session- pt requested ice post treatment. She states she has less pain today than she did after last session. She states she will start back to work 3 x week in March. 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 Pt. Verbalized understanding.     Melani is not progressing well towards her goals and there are no updates to goals at this time. Pt prognosis continues as Fair. Pt will continue to benefit from skilled outpatient occupational therapy to address the deficits listed in the problem list on initial evaluation, provide pt/family education and to maximize pt's level of independence in the home and community environment.     Anticipated barriers to continued occupational therapy: high pain reports, pt's understanding of pain levels    Pt's spiritual, cultural and educational needs considered and pt agreeable to plan of care and goals.    Goals   Goals:   The following goals were discussed with the patient and patient is in agreement with them as to be addressed in the treatment plan.   Long Term Goals (LTGs); to be met by discharge.  LTG #1: Pt will report a pain level of 4 out of 10 with ADLs -progressing  LTG #2: Pt will demo improved FOTO score by 20 points. -progressing  LTG #3: Pt will return to prior level of function for ADLs and household management. -progressing     Short Term Goals (STGs); to be met within 4 weeks (03/13/2020).  STG #1a: Pt will report 6 out of 10 pain level with  ADLs.-progressing  STG #2a: Pt will report/demo [place] with opening car door with left hand.-progressing  STG #2b: Pt will report/demo [place] with dressing/undressing with left hand.-progressing  STG #3a: Pt will demonstrate independence with issued HEP-progressing.   STG #3b: Pt will demo improved  strength by 5 # in order to increase strength for return to work tasks.-progressing    Plan     Continue skilled occupational therapy with individualized plan of care focusing on return to function and pain relief.       Updates/Grading for next session: cont to progress as able,  Increasing strengthening as able.     Lilliana Thorpe, OTR/L,CHT

## 2020-03-02 ENCOUNTER — CLINICAL SUPPORT (OUTPATIENT)
Dept: REHABILITATION | Facility: HOSPITAL | Age: 30
End: 2020-03-02
Payer: MEDICAID

## 2020-03-02 DIAGNOSIS — M79.641 PAIN IN BOTH HANDS: ICD-10-CM

## 2020-03-02 DIAGNOSIS — R29.898 WEAKNESS OF BOTH HANDS: ICD-10-CM

## 2020-03-02 DIAGNOSIS — M79.642 PAIN IN BOTH HANDS: ICD-10-CM

## 2020-03-02 PROCEDURE — 97530 THERAPEUTIC ACTIVITIES: CPT | Mod: PN

## 2020-03-02 NOTE — PROGRESS NOTES
"  Occupational Therapy Daily Treatment Note      Date: 3/2/2020  Name: Melani Sloan  Clinic Number: [id number]    Therapy Diagnosis:   Encounter Diagnoses   Name Primary?    Pain in both hands     Weakness of both hands      Physician: Dr. Idalmis Waddell    Physician Orders: eval and treat focus mostly on the left wrist as it is post operative  Medical Diagnosis: Left carpal tunnel release and right wrist tendinitis  Surgical Procedure and Date: 01/24/2020, Carpal tunnel release  Evaluation Date: 02/14/2020  Insurance Authorization Period Expiration: 04/17/2020  Plan of Care Certification Period: 02/14/2020-03/28/2020- extended to 04/17/2020 due to pt being out of town for a week.    Date of Return to MD: 03/03/2020 @1130     Visit # / Visits authorized: 4 / 17     FOTO: initial eval        Time In: 3:00 pm  Time Out: 3:55 pm  Total treatment time: 55minutes  Total Timed minutes 45minutes   TA x 3     Precautions:  Standard strengthening at 5-6 weeks post op (02/28/2020 = 5 weeks)    Subjective     Pt reports: "The pain kind of comes and goes. It's still bad sometimes. I tried going to work yesterday, and I couldn't do it. I can't manage the dogs."   she was compliant with home exercise program given last session.   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 Measured in centimeters.    2/14/2020 2/14/2020 3/2/2020     Right Left Left   Wrist Crease 15.2 16.4 15.9   MCPs 18.5 18.5 17.8               Elbow and Wrist ROM. Measured in degrees.    2/14/2020 2/14/2020 3/2/2020     Right Left Left                     Wrist Ext/Flex 55/80 65/75 *pain reported 65/82 *with pain   Wrist RD/UD 20/25 16/30 18/42      Hand ROM. Measured in degrees.    2/14/2020 2/14/2020 3/2/2020     Right Left Left                     Thumb: MP 65 69 70 (+1) "                IP 0 17 75 (+58)       Rad ADD/ABD 40 40 50       Pal ADD/ABD 45 45 45          Opposition SF MP crease SF MPcrease To DPC       Strength (Dynamometer) and Pinch Strength (Pinch Gauge)  Measured in pounds.    2/14/2020 2/14/2020 3/2/2020     Right Left Left   Rung II 42 7 38 (+31)   Scott Pinch 12 5 7 (+2) *pain in thenar   3pt Pinch 13 5 7 (+2) *pain in thenar      Sensation : shooting pain up the arm and numbness in the LF on left     Melani received the following supervised modalities after being cleared for contradictions for 10 minutes:   Patient received paraffin bath to L hand(s) for 10 minutes to increase blood flow, circulation, pain management and for tissue elasticity prior to therex.           Melani received the following manual therapy techniques for 10 minutes:    --Performed Retrograde massage to left fingers/hand/wrist to decrease edema, improve joint mobility, decrease pain and improve lymphatic drainage to increase hand function.   -Performed scar massage to incision  to decrease adhesions and improve tensile glide.         Melani received therapeutic exercises for 30  minutes including:  -  AROM Wrist  Ext/flx  RD/UD    X 10 reps each    Wave, hook, straight fist, composite fist, finger spreads, finger lifts, pinky slides        X 10 reps each    Prayer stretch X 30 sec x 3   Wrist Maze X 3 min   In hand manipulation with coins X 1 containers   Isospheres X 2 min   Wrist PRE X 3/10 x 1 # 3 ways   Yellow Putty X 3 min  X 10 squeezes  X 5 pancake/blooms  X 3 logs 3 pt pinch  X 3 logs lat pinch       Patient received cold pack x 5 minutes to decrease pain/inflammation and edema following treatment session.         Home Exercises and Education Provided     Education provided: Cont HEP, can stretch and massage wrist to help with tightness, increase scar massage, prayer stretch lighter to not increase pain. Yellow theraputty 3 x week NOT EVERYDAY  - Progress towards goals      Written Home Exercises Provided: yes.  Exercises were reviewed and Melani was able to demonstrate them prior to the end of the session.  Melani demonstrated good  understanding of the education provided.   .   See EMR under Media for exercises provided 02/28/2020.     Assessment    Pt tolerated tx fairly well today. She states she has an MD appt on 03/03/2020 tomorrow. She cont to have shooting pains in the wrist. Therapist advised again that this is normal nerve healing and it will decrease.  Presents with less pain today than last session.  However, pt c/o pain in RF and LF with in hand manipulation with coins. Upon reassessment, pt demonstrates improved edema, ROM, and  and pinch strength. Pt started work this weekend, but reported she was unable to do some of it and had difficulty managing the dogs.  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 Pt. Verbalized understanding.     Melani is progressing fairly towards her goals and there are no updates to goals at this time. Pt prognosis continues as Fair. Pt will continue to benefit from skilled outpatient occupational therapy to address the deficits listed in the problem list on initial evaluation, provide pt/family education and to maximize pt's level of independence in the home and community environment.     Anticipated barriers to continued occupational therapy: high pain reports, pt's understanding of pain levels    Pt's spiritual, cultural and educational needs considered and pt agreeable to plan of care and goals.    Goals   Goals:   The following goals were discussed with the patient and patient is in agreement with them as to be addressed in the treatment plan.   Long Term Goals (LTGs); to be met by discharge.  LTG #1: Pt will report a pain level of 4 out of 10 with ADLs -progressing  LTG #2: Pt will demo improved FOTO score by 20 points. -progressing  LTG #3: Pt will return to prior level of function  for ADLs and household management. -progressing     Short Term Goals (STGs); to be met within 4 weeks (03/13/2020).  STG #1a: Pt will report 6 out of 10 pain level with ADLs.-progressing  STG #2a: Pt will report/demo [place] with opening car door with left hand.-progressing  STG #2b: Pt will report/demo [place] with dressing/undressing with left hand.-progressing  STG #3a: Pt will demonstrate independence with issued HEP-progressing.   STG #3b: Pt will demo improved  strength by 5 # in order to increase strength for return to work tasks.---met    Plan     Continue skilled occupational therapy with individualized plan of care focusing on return to function and pain relief.       Updates/Grading for next session: cont to progress as able,  Increasing strengthening as able.     Corinna Lenz OTR/L

## 2020-03-03 ENCOUNTER — DOCUMENTATION ONLY (OUTPATIENT)
Dept: REHABILITATION | Facility: HOSPITAL | Age: 30
End: 2020-03-03

## 2020-03-03 NOTE — PROGRESS NOTES
OCCUPATIONAL THERAPY RETURN TO DOCTOR PROGRESS NOTE            Patient: Melani Sloan  MRN: [id number]     Therapy Diagnosis:        Encounter Diagnoses   Name Primary?    Pain in both hands      Weakness of both hands        Physician: Dr. Idalmis Waddell     Physician Orders: eval and treat focus mostly on the left wrist as it is post operative  Medical Diagnosis: Left carpal tunnel release and right wrist tendinitis  Surgical Procedure and Date: 01/24/2020, Carpal tunnel release  Evaluation Date: 02/14/2020  Plan of Care Certification Period: 02/14/2020-03/28/2020- extended to 04/17/2020 due to pt being out of town for a week.    Date of Return to MD: 03/03/2020 @1130       Precautions:  Standard strengthening at 5-6 weeks post op (02/28/2020 = 5 weeks)       TOTAL VISITS: 4    Pt is progressing fairly in therapy. She demonstrates significant increase in  strength this date compared to eval. She demonstrates improved ROM WFL, and improved edema. She is compliant with therapy attendance, but requires mod cuing for techniques of exercises. And pt cont to report moderately high pain level and shooting pains.     Objective:     Edema. Measured in centimeters.    2/14/2020 2/14/2020 3/2/2020     Right Left Left   Wrist Crease 15.2 16.4 15.9   MCPs 18.5 18.5 17.8               Elbow and Wrist ROM. Measured in degrees.    2/14/2020 2/14/2020 3/2/2020     Right Left Left                       Wrist Ext/Flex 55/80 65/75 *pain reported 65/82 *with pain   Wrist RD/UD 20/25 16/30 18/42      Hand ROM. Measured in degrees.    2/14/2020 2/14/2020 3/2/2020     Right Left Left                       Thumb: MP 65 69 70 (+1)                IP 0 17 75 (+58)       Rad ADD/ABD 40 40 50       Pal ADD/ABD 45 45 45          Opposition SF MP crease SF MPcrease To DPC       Strength (Dynamometer) and Pinch Strength (Pinch Gauge)  Measured in pounds.    2/14/2020 2/14/2020 3/2/2020     Right Left Left   Rung II 42 7 38 (+31)   Scott Pinch  12 5 7 (+2) *pain in thenar   3pt Pinch 13 5 7 (+2) *pain in thenar       Kristin Labranche, [facility]  [phone] phone  [phone] fax

## 2020-03-04 ENCOUNTER — CLINICAL SUPPORT (OUTPATIENT)
Dept: REHABILITATION | Facility: HOSPITAL | Age: 30
End: 2020-03-04
Payer: MEDICAID

## 2020-03-04 DIAGNOSIS — R29.898 WEAKNESS OF BOTH HANDS: ICD-10-CM

## 2020-03-04 DIAGNOSIS — M79.642 PAIN IN BOTH HANDS: ICD-10-CM

## 2020-03-04 DIAGNOSIS — M79.641 PAIN IN BOTH HANDS: ICD-10-CM

## 2020-03-04 PROCEDURE — 97530 THERAPEUTIC ACTIVITIES: CPT | Mod: PN

## 2020-03-04 NOTE — PROGRESS NOTES
"  Occupational Therapy Daily Treatment Note      Date: 3/4/2020  Name: Melani Sloan  Clinic Number: [id number]    Therapy Diagnosis:   Encounter Diagnoses   Name Primary?    Pain in both hands     Weakness of both hands      Physician: Dr. Idalmis Waddell    Physician Orders: eval and treat focus mostly on the left wrist as it is post operative  Medical Diagnosis: Left carpal tunnel release and right wrist tendinitis  Surgical Procedure and Date: 01/24/2020, Carpal tunnel release  Evaluation Date: 02/14/2020  Insurance Authorization Period Expiration: 04/17/2020  Plan of Care Certification Period: 02/14/2020-03/28/2020- extended to 04/17/2020 due to pt being out of town for a week.    Date of Return to MD: 03/03/2020 @1130     Visit # / Visits authorized: 5 / 17     FOTO: initial eval        Time In: 11:50 am  Time Out: 12:50 pm  Total treatment time: 60minutes  Total Timed minutes 45minutes   TA x 3     Precautions:  Standard strengthening at 5-6 weeks post op (02/28/2020 = 5 weeks)    Subjective     Pt reports: "I saw the doctor and she sent an extension to continue therapy."   she was compliant with home exercise program given last session.   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 Measured in centimeters.    2/14/2020 2/14/2020 3/2/2020     Right Left Left   Wrist Crease 15.2 16.4 15.9   MCPs 18.5 18.5 17.8               Elbow and Wrist ROM. Measured in degrees.    2/14/2020 2/14/2020 3/2/2020     Right Left Left                     Wrist Ext/Flex 55/80 65/75 *pain reported 65/82 *with pain   Wrist RD/UD 20/25 16/30 18/42      Hand ROM. Measured in degrees.    2/14/2020 2/14/2020 3/2/2020     Right Left Left                     Thumb: MP 65 69 70 (+1)                IP 0 17 75 (+58)       Rad ADD/ABD 40 40 50       Pal " ADD/ABD 45 45 45          Opposition SF MP crease SF MPcrease To DPC       Strength (Dynamometer) and Pinch Strength (Pinch Gauge)  Measured in pounds.    2/14/2020 2/14/2020 3/2/2020     Right Left Left   Rung II 42 7 38 (+31)   Scott Pinch 12 5 7 (+2) *pain in thenar   3pt Pinch 13 5 7 (+2) *pain in thenar      Sensation : shooting pain up the arm and numbness in the LF on left     Melani received the following supervised modalities after being cleared for contradictions for 10 minutes:   Patient received paraffin bath to L hand(s) for 10 minutes to increase blood flow, circulation, pain management and for tissue elasticity prior to therex.           Melani received the following manual therapy techniques for 10 minutes:    --Performed Retrograde massage to left fingers/hand/wrist to decrease edema, improve joint mobility, decrease pain and improve lymphatic drainage to increase hand function.   -Performed scar massage to incision with and without hawks  and mini vibratory scar massager to decrease adhesions and improve tensile glide.         Melani received therapeutic exercises for 35  minutes including:  -  AROM Wrist  Ext/flx  RD/UD    X 10 reps each    Wave, hook, straight fist, composite fist, finger spreads, finger lifts, pinky slides        X 10 reps each    Prayer stretch X 30 sec x 3   Wrist Maze X 3 min   In hand manipulation with coins X 1 containers   Isospheres X 3 min   Wrist PRE X 3/10 x 1 # 3 ways   Clothespin, red, 2#, pompom , lateral key and tripod pinch X 2 containers each   Flexbar, red, smiles/frowns X 10 reps each   Yellow Putty X 3 min  X 15 squeezes  X 5 pancake/blooms  X 3 logs 3 pt pinch  X 3 logs lat pinch       Patient received cold pack x 5 minutes to decrease pain/inflammation and edema following treatment session.         Home Exercises and Education Provided     Education provided: Cont HEP, can stretch and massage wrist to help with tightness, increase scar  massage, prayer stretch lighter to not increase pain. Yellow theraputty 3 x week NOT EVERYDAY. Pt given [place] theraputty to progress with HEP.   - Progress towards goals     Written Home Exercises Provided: yes.  Exercises were reviewed and Melani was able to demonstrate them prior to the end of the session.  Melani demonstrated good  understanding of the education provided.   .   See EMR under Media for exercises provided 02/28/2020.     Assessment    Pt tolerated tx fairly well today.  She cont to have some shooting pains in the thumb and wrist.  Presents with less pain today than last session. Able to progress with light strengthening exercise today without c/o pain. Pt reports yellow putty has been getting easy with HEP, given [place] to progress to. Pt participated well. Fibrotic tissue and mild swelling noted at scar and palm, but good response to manual therapy.    Melani is progressing fairly towards her goals and there are no updates to goals at this time. Pt prognosis continues as Fair. Pt will continue to benefit from skilled outpatient occupational therapy to address the deficits listed in the problem list on initial evaluation, provide pt/family education and to maximize pt's level of independence in the home and community environment.     Anticipated barriers to continued occupational therapy: high pain reports, pt's understanding of pain levels    Pt's spiritual, cultural and educational needs considered and pt agreeable to plan of care and goals.    Goals   Goals:   The following goals were discussed with the patient and patient is in agreement with them as to be addressed in the treatment plan.   Long Term Goals (LTGs); to be met by discharge.  LTG #1: Pt will report a pain level of 4 out of 10 with ADLs -progressing  LTG #2: Pt will demo improved FOTO score by 20 points. -progressing  LTG #3: Pt will return to prior level of function for ADLs and household management. -progressing     Short Term  Goals (STGs); to be met within 4 weeks (03/13/2020).  STG #1a: Pt will report 6 out of 10 pain level with ADLs.-progressing  STG #2a: Pt will report/demo [place] with opening car door with left hand.-progressing  STG #2b: Pt will report/demo [place] with dressing/undressing with left hand.-progressing  STG #3a: Pt will demonstrate independence with issued HEP-progressing.   STG #3b: Pt will demo improved  strength by 5 # in order to increase strength for return to work tasks.---met    Plan     Continue skilled occupational therapy with individualized plan of care focusing on return to function and pain relief.       Updates/Grading for next session: cont to progress as able,  Increasing strengthening as able.     Corinna Lenz, OTR/L

## 2020-03-10 ENCOUNTER — CLINICAL SUPPORT (OUTPATIENT)
Dept: REHABILITATION | Facility: HOSPITAL | Age: 30
End: 2020-03-10
Payer: MEDICAID

## 2020-03-10 DIAGNOSIS — R29.898 WEAKNESS OF BOTH HANDS: ICD-10-CM

## 2020-03-10 DIAGNOSIS — M79.642 PAIN IN BOTH HANDS: ICD-10-CM

## 2020-03-10 DIAGNOSIS — M79.641 PAIN IN BOTH HANDS: ICD-10-CM

## 2020-03-10 PROCEDURE — 97530 THERAPEUTIC ACTIVITIES: CPT | Mod: PN

## 2020-03-10 NOTE — PROGRESS NOTES
"  Occupational Therapy Daily Treatment Note      Date: 3/10/2020  Name: Melani Sloan  Clinic Number: [id number]    Therapy Diagnosis:   Encounter Diagnoses   Name Primary?    Pain in both hands     Weakness of both hands      Physician: Dr. Idalmis Waddell    Physician Orders: eval and treat focus mostly on the left wrist as it is post operative  Medical Diagnosis: Left carpal tunnel release and right wrist tendinitis  Surgical Procedure and Date: 01/24/2020, Carpal tunnel release  Evaluation Date: 02/14/2020  Insurance Authorization Period Expiration: 04/17/2020  Plan of Care Certification Period: 02/14/2020-03/28/2020- extended to 04/17/2020 due to pt being out of town for a week.    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 Pt reports she has been sleeping in it at night.   she was compliant with home exercise program given last session.   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 Measured in centimeters.    2/14/2020 2/14/2020 3/2/2020     Right Left Left   Wrist Crease 15.2 16.4 15.9   MCPs 18.5 18.5 17.8               Elbow and Wrist ROM. Measured in degrees.    2/14/2020 2/14/2020 3/2/2020     Right Left Left                     Wrist Ext/Flex 55/80 65/75 *pain reported 65/82 *with pain   Wrist RD/UD 20/25 16/30 18/42      Hand ROM. Measured in degrees.    2/14/2020 2/14/2020 3/2/2020 "     Right Left Left                     Thumb: MP 65 69 70 (+1)                IP 0 17 75 (+58)       Rad ADD/ABD 40 40 50       Pal ADD/ABD 45 45 45          Opposition SF MP crease SF MPcrease To DPC       Strength (Dynamometer) and Pinch Strength (Pinch Gauge)  Measured in pounds.    2/14/2020 2/14/2020 3/2/2020     Right Left Left   Rung II 42 7 38 (+31)   Scott Pinch 12 5 7 (+2) *pain in thenar   3pt Pinch 13 5 7 (+2) *pain in thenar      Sensation : shooting pain up the arm and numbness in the LF on left     Melani received the following supervised modalities after being cleared for contradictions for 10 minutes:   Patient received paraffin bath to L hand(s) for 10 minutes to increase blood flow, circulation, pain management and for tissue elasticity prior to therex.           Melani received the following manual therapy techniques for 0 minutes:    --Performed Retrograde massage to left fingers/hand/wrist to decrease edema, improve joint mobility, decrease pain and improve lymphatic drainage to increase hand function.   -Performed scar massage to incision with and without hawks  and mini vibratory scar massager to decrease adhesions and improve tensile glide. NOT PERFORMED THIS TREATMENT today          Melani received therapeutic exercises for 45  minutes including:  -  AROM Wrist  Ext/flx  RD/UD    X 10 reps each    Wave, hook, straight fist, composite fist, finger spreads, finger lifts, pinky slides        X 10 reps each    Prayer stretch X 30 sec x 3   Wrist Maze X 3 min   In hand manipulation with coins X 1 containers   Isospheres X 3 min   Wrist PRE X 3/10 x 2# 3 ways   Clothespin, green, 4#, pompom , lateral key and tripod pinch X 2 containers each   Flexbar, red, smiles/frowns X 10 reps each   Peach Putty X 3 min  X 15 squeezes  X 5 pancake/blooms  X 3 logs 3 pt pinch  X 3 logs lat pinch       Patient received cold pack x 5 minutes to decrease pain/inflammation and edema following  treatment session.     Adjustments made to old resting hand orthosis.     Home Exercises and Education Provided     Education provided: Cont HEP, can stretch and massage wrist to help with tightness, increase scar massage, prayer stretch lighter to not increase pain. Peach theraputty 3 x week NOT EVERYDAY.  - Progress towards goals     Written Home Exercises Provided: yes.  Exercises were reviewed and Melani was able to demonstrate them prior to the end of the session.  Melani demonstrated good  understanding of the education provided.   .   See EMR under Media for exercises provided 02/28/2020.     Assessment    Pt tolerated tx fairly well today.  She cont to have some shooting pains in the thumb and wrist. 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    Able to progress with light strengthening exercise today without c/o pain.  Pt participated well.     Melani is progressing fairly towards her goals and there are no updates to goals at this time. Pt prognosis continues as Fair. Pt will continue to benefit from skilled outpatient occupational therapy to address the deficits listed in the problem list on initial evaluation, provide pt/family education and to maximize pt's level of independence in the home and community environment.     Anticipated barriers to continued occupational therapy: high pain reports, pt's understanding of pain levels    Pt's spiritual, cultural and educational needs considered and pt agreeable to plan of care and goals.    Goals   Goals:   The following goals were discussed with the patient and patient is in agreement with them as to be addressed in the treatment plan.   Long Term Goals (LTGs); to be met by discharge.  LTG #1: Pt will report a pain level of 4 out of 10 with ADLs -progressing  LTG #2: Pt will demo improved FOTO score by 20 points. -progressing  LTG #3: Pt will return to prior level of function for ADLs and household  management. -progressing     Short Term Goals (STGs); to be met within 4 weeks (03/13/2020).  STG #1a: Pt will report 6 out of 10 pain level with ADLs.-progressing  STG #2a: Pt will report/demo [place] with opening car door with left hand.-progressing  STG #2b: Pt will report/demo [place] with dressing/undressing with left hand.-progressing  STG #3a: Pt will demonstrate independence with issued HEP-progressing.   STG #3b: Pt will demo improved  strength by 5 # in order to increase strength for return to work tasks.---met    Plan     Continue skilled occupational therapy with individualized plan of care focusing on return to function and pain relief.       Updates/Grading for next session: cont to progress as able,  Increasing strengthening as able.     Corinna Lenz OTR/L

## 2020-03-12 ENCOUNTER — CLINICAL SUPPORT (OUTPATIENT)
Dept: REHABILITATION | Facility: HOSPITAL | Age: 30
End: 2020-03-12
Payer: MEDICAID

## 2020-03-12 DIAGNOSIS — M79.641 PAIN IN BOTH HANDS: ICD-10-CM

## 2020-03-12 DIAGNOSIS — M79.642 PAIN IN BOTH HANDS: ICD-10-CM

## 2020-03-12 DIAGNOSIS — R29.898 WEAKNESS OF BOTH HANDS: ICD-10-CM

## 2020-03-12 PROCEDURE — 97530 THERAPEUTIC ACTIVITIES: CPT | Mod: PN

## 2020-03-12 NOTE — PROGRESS NOTES
"  Occupational Therapy Daily Treatment Note      Date: 3/12/2020  Name: Melani Sloan  Clinic Number: [id number]    Therapy Diagnosis:   Encounter Diagnoses   Name Primary?    Pain in both hands     Weakness of both hands      Physician: Dr. Idalmis Waddell    Physician Orders: eval and treat focus mostly on the left wrist as it is post operative  Medical Diagnosis: Left carpal tunnel release and right wrist tendinitis  Surgical Procedure and Date: 01/24/2020, Carpal tunnel release  Evaluation Date: 02/14/2020  Insurance Authorization Period Expiration: 04/17/2020  Plan of Care Certification Period: 02/14/2020-03/28/2020- extended to 04/17/2020 due to pt being out of town for a week.    Date of Return to MD: 04/14/2020     Visit # / Visits authorized: 7 / 17      FOTO: initial eval, 7th visit: 38% limitation (31 points improved)        Time In: 9:00 am  Time Out: 10:00 am  Total treatment time: 60minutes  Total Timed minutes 45minutes   TA x 3     Precautions:  Standard strengthening at 5-6 weeks post op (02/28/2020 = 5 weeks)    Subjective     Pt reports: "the spot where she did the surgery still hurts when I push on it."   she was compliant with home exercise program given last session.   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 Measured in centimeters.    2/14/2020 2/14/2020 3/2/2020     Right Left Left   Wrist Crease 15.2 16.4 15.9   MCPs 18.5 18.5 17.8               Elbow and Wrist ROM. Measured in degrees.    2/14/2020 2/14/2020 3/2/2020     Right Left Left                     Wrist Ext/Flex 55/80 65/75 *pain reported 65/82 *with pain   Wrist RD/UD 20/25 16/30 18/42      Hand ROM. Measured in degrees.    2/14/2020 2/14/2020 3/2/2020     Right Left Left                     Thumb: MP 65 69 70 (+1)                IP 0 17 75 " (+58)       Rad ADD/ABD 40 40 50       Pal ADD/ABD 45 45 45          Opposition SF MP crease SF MPcrease To DPC       Strength (Dynamometer) and Pinch Strength (Pinch Gauge)  Measured in pounds.    2/14/2020 2/14/2020 3/2/2020     Right Left Left   Rung II 42 7 38 (+31)   Scott Pinch 12 5 7 (+2) *pain in thenar   3pt Pinch 13 5 7 (+2) *pain in thenar      Sensation : shooting pain up the arm and numbness in the LF on left     Melani received the following supervised modalities after being cleared for contradictions for 10 minutes:   Patient received paraffin bath to L hand(s) for 10 minutes to increase blood flow, circulation, pain management and for tissue elasticity prior to therex.           Melani received the following manual therapy techniques for 10 minutes:    --Performed Retrograde massage to left fingers/hand/wrist to decrease edema, improve joint mobility, decrease pain and improve lymphatic drainage to increase hand function.   -Performed scar massage to incision with and without hawks  and mini vibratory scar massager to decrease adhesions and improve tensile glide.           Melani received therapeutic exercises for 35  minutes including:  -  AROM Wrist  Ext/flx  RD/UD    X 10 reps each    Wave, hook, straight fist, composite fist, finger spreads, finger lifts, pinky slides        X 10 reps each    Prayer stretch X 30 sec x 3   Wrist Maze X 3 min   In hand manipulation with coins X 1 containers   Isospheres X 3 min   Wrist PRE X 3/10 x 2# 3 ways   Clothespin, green, 4#, pompom , lateral key and tripod pinch X 20 reps each, no pompoms today   Flexbar, red, smiles/frowns X 10 reps each   PHG, 2nd rung, gold spring X 10 reps   Digit extension, red rubber band 2 x 10 reps   Peach Putty @ home X 3 min  X 15 squeezes  X 5 pancake/blooms  X 3 logs 3 pt pinch  X 3 logs lat pinch       Patient received cold pack x 5 minutes to decrease pain/inflammation and edema following treatment session.          Home Exercises and Education Provided     Education provided: Cont HEP, can stretch and massage wrist to help with tightness, increase scar massage, prayer stretch lighter to not increase pain. Peach theraputty 3 x week NOT EVERYDAY.  - Progress towards goals     Written Home Exercises Provided: yes.  Exercises were reviewed and Melani was able to demonstrate them prior to the end of the session.  Melani demonstrated good  understanding of the education provided.   .   See EMR under Media for exercises provided 02/28/2020.     Assessment    Pt tolerated tx fairly well today.  She cont to c/o pain at scar and in palm. She cont to report occasional shooting pain in thumb. 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    Able to cont to progress with light strengthening.  Pt participated well.     Melani is progressing fairly towards her goals and there are no updates to goals at this time. Pt prognosis continues as Fair. Pt will continue to benefit from skilled outpatient occupational therapy to address the deficits listed in the problem list on initial evaluation, provide pt/family education and to maximize pt's level of independence in the home and community environment.     Anticipated barriers to continued occupational therapy: high pain reports, pt's understanding of pain levels    Pt's spiritual, cultural and educational needs considered and pt agreeable to plan of care and goals.    Goals   Goals:   The following goals were discussed with the patient and patient is in agreement with them as to be addressed in the treatment plan.   Long Term Goals (LTGs); to be met by discharge.  LTG #1: Pt will report a pain level of 4 out of 10 with ADLs -progressing  LTG #2: Pt will demo improved FOTO score by 20 points. -progressing  LTG #3: Pt will return to prior level of function for ADLs and household management. -progressing     Short Term Goals (STGs); to be  met within 4 weeks (03/13/2020).  STG #1a: Pt will report 6 out of 10 pain level with ADLs.-progressing  STG #2a: Pt will report/demo [place] with opening car door with left hand.-progressing  STG #2b: Pt will report/demo [place] with dressing/undressing with left hand.-progressing  STG #3a: Pt will demonstrate independence with issued HEP-progressing.   STG #3b: Pt will demo improved  strength by 5 # in order to increase strength for return to work tasks.---met    Plan     Continue skilled occupational therapy with individualized plan of care focusing on return to function and pain relief.       Updates/Grading for next session: cont to progress as able,  Increasing strengthening as able.     Corinna Lenz, OTR/L

## 2020-03-16 ENCOUNTER — CLINICAL SUPPORT (OUTPATIENT)
Dept: REHABILITATION | Facility: HOSPITAL | Age: 30
End: 2020-03-16
Payer: MEDICAID

## 2020-03-16 DIAGNOSIS — M79.642 PAIN IN BOTH HANDS: ICD-10-CM

## 2020-03-16 DIAGNOSIS — M79.641 PAIN IN BOTH HANDS: ICD-10-CM

## 2020-03-16 DIAGNOSIS — R29.898 WEAKNESS OF BOTH HANDS: ICD-10-CM

## 2020-03-16 PROCEDURE — 97530 THERAPEUTIC ACTIVITIES: CPT | Mod: PN

## 2020-03-16 NOTE — PROGRESS NOTES
"  Occupational Therapy Daily Treatment Note      Date: 3/16/2020  Name: Melani Sloan  Clinic Number: [id number]    Therapy Diagnosis:   Encounter Diagnoses   Name Primary?    Pain in both hands     Weakness of both hands      Physician: Dr. Idalmis Waddell    Physician Orders: eval and treat focus mostly on the left wrist as it is post operative  Medical Diagnosis: Left carpal tunnel release and right wrist tendinitis  Surgical Procedure and Date: 01/24/2020, Carpal tunnel release  Evaluation Date: 02/14/2020  Insurance Authorization Period Expiration: 04/17/2020  Plan of Care Certification Period: 02/14/2020-03/28/2020- extended to 04/17/2020 due to pt being out of town for a week.    Date of Return to MD: 04/14/2020     Visit # / Visits authorized: 8 / 17      FOTO: initial eval, 7th visit: 38% limitation (31 points improved)        Time In: 9:50 am  Time Out: 10:50 am  Total treatment time: 60minutes  Total Timed minutes 45minutes   TA x 3     Precautions:  Standard strengthening at 5-6 weeks post op (02/28/2020 = 5 weeks)    Subjective     Pt reports: "I don't feel like it's changed much. I still have a lot of pain where she cut me (re: scar)."   she was compliant with home exercise program given last session.   Response to previous treatment:no change  Functional change: reports decreased pain with dressing tasks; significant difficulty and pain still with job duties and handling dogs    Pain:  7 -8/10  Location: left arm  And hand    Objective       Edema. Measured in centimeters.    2/14/2020 2/14/2020 3/2/2020     Right Left Left   Wrist Crease 15.2 16.4 15.9   MCPs 18.5 18.5 17.8               Elbow and Wrist ROM. Measured in degrees.    2/14/2020 2/14/2020 3/2/2020     Right Left Left                     Wrist Ext/Flex 55/80 65/75 *pain reported 65/82 *with pain   Wrist RD/UD 20/25 16/30 18/42      Hand ROM. Measured in degrees.    2/14/2020 2/14/2020 3/2/2020     Right Left Left                     Thumb: " MP 65 69 70 (+1)                IP 0 17 75 (+58)       Rad ADD/ABD 40 40 50       Pal ADD/ABD 45 45 45          Opposition SF MP crease SF MPcrease To DPC       Strength (Dynamometer) and Pinch Strength (Pinch Gauge)  Measured in pounds.    2/14/2020 2/14/2020 3/2/2020     Right Left Left   Rung II 42 7 38 (+31)   Scott Pinch 12 5 7 (+2) *pain in thenar   3pt Pinch 13 5 7 (+2) *pain in thenar      Sensation : shooting pain up the arm and numbness in the LF on left     Melani received the following supervised modalities after being cleared for contradictions for 10 minutes:   Patient received moist heat pack to L hand(s) for 10 minutes to increase blood flow, circulation, pain management and for tissue elasticity prior to therex. (paraffin not available)          Melani received the following manual therapy techniques for 10 minutes:    --Performed Retrograde massage to left fingers/hand/wrist to decrease edema, improve joint mobility, decrease pain and improve lymphatic drainage to increase hand function.   -Performed scar massage to incision with and without hawks  and mini vibratory scar massager to decrease adhesions and improve tensile glide.           Melani received therapeutic exercises for 35  minutes including:  -  AROM Wrist  Ext/flx  RD/UD    X 10 reps each    Wave, hook, straight fist, composite fist, finger spreads, finger lifts, pinky slides        X 10 reps each    Prayer stretch X 30 sec x 3   Wrist Maze X 3 min   In hand manipulation with coins X 1 containers   Isospheres X 3 min   Wrist PRE X 3/10 x 2# 3 ways   Clothespin, green, 4#, pompom , lateral key and tripod pinch X 20 reps each, no pompoms today   Flexbar, red, smiles/frowns X 10 reps each   PHG, 2nd rung, [place] 2 X 10 reps   Digit extension, red rubber band 2 x 10 reps   Peach Putty  X 3 min  X 15 squeezes  X 5 pancake/blooms  X 3 logs 3 pt pinch  X 3 logs lat pinch       Patient received cold pack x 5 minutes to  decrease pain/inflammation and edema following treatment session.         Home Exercises and Education Provided     Education provided: Cont HEP, can stretch and massage wrist to help with tightness, increase scar massage, prayer stretch lighter to not increase pain. Peach theraputty 3 x week NOT EVERYDAY.  - Progress towards goals     Written Home Exercises Provided: yes.  Exercises were reviewed and Melani was able to demonstrate them prior to the end of the session.  Melani demonstrated good  understanding of the education provided.   .   See EMR under Media for exercises provided 02/28/2020.     Assessment    Pt tolerated tx fairly well today.  She cont to c/o significant pain at scar and in thumb. She cont to report occasional shooting pain in thumb. 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    Able to cont to progress with light strengthening with minimal pain reported.  Pt participated well.     Melani is progressing fairly towards her goals and there are no updates to goals at this time. Pt prognosis continues as Fair. Pt will continue to benefit from skilled outpatient occupational therapy to address the deficits listed in the problem list on initial evaluation, provide pt/family education and to maximize pt's level of independence in the home and community environment.     Anticipated barriers to continued occupational therapy: high pain reports, pt's understanding of pain levels    Pt's spiritual, cultural and educational needs considered and pt agreeable to plan of care and goals.    Goals   Goals:   The following goals were discussed with the patient and patient is in agreement with them as to be addressed in the treatment plan.   Long Term Goals (LTGs); to be met by discharge.  LTG #1: Pt will report a pain level of 4 out of 10 with ADLs -progressing  LTG #2: Pt will demo improved FOTO score by 20 points. -progressing  LTG #3: Pt will return to  prior level of function for ADLs and household management. -progressing     Short Term Goals (STGs); to be met within 4 weeks (03/13/2020).  STG #1a: Pt will report 6 out of 10 pain level with ADLs.-progressing  STG #2a: Pt will report/demo [place] with opening car door with left hand.-progressing  STG #2b: Pt will report/demo [place] with dressing/undressing with left hand.-progressing  STG #3a: Pt will demonstrate independence with issued HEP-progressing.   STG #3b: Pt will demo improved  strength by 5 # in order to increase strength for return to work tasks.---met    Plan     Continue skilled occupational therapy with individualized plan of care focusing on return to function and pain relief.       Updates/Grading for next session: cont to progress as able,  Increasing strengthening as able.     Corinna Lenz, OTR/L

## 2020-03-18 ENCOUNTER — CLINICAL SUPPORT (OUTPATIENT)
Dept: REHABILITATION | Facility: HOSPITAL | Age: 30
End: 2020-03-18
Payer: MEDICAID

## 2020-03-18 DIAGNOSIS — R29.898 WEAKNESS OF BOTH HANDS: ICD-10-CM

## 2020-03-18 DIAGNOSIS — M79.642 PAIN IN BOTH HANDS: ICD-10-CM

## 2020-03-18 DIAGNOSIS — M79.641 PAIN IN BOTH HANDS: ICD-10-CM

## 2020-03-18 PROCEDURE — 97530 THERAPEUTIC ACTIVITIES: CPT | Mod: PN

## 2020-03-18 NOTE — PROGRESS NOTES
"  Occupational Therapy Daily Treatment Note      Date: 3/18/2020  Name: Melani Sloan  Clinic Number: [id number]    Therapy Diagnosis:   Encounter Diagnoses   Name Primary?    Pain in both hands     Weakness of both hands      Physician: Dr. Idalmis Waddell    Physician Orders: eval and treat focus mostly on the left wrist as it is post operative  Medical Diagnosis: Left carpal tunnel release and right wrist tendinitis  Surgical Procedure and Date: 01/24/2020, Carpal tunnel release  Evaluation Date: 02/14/2020  Insurance Authorization Period Expiration: 04/17/2020  Plan of Care Certification Period: 02/14/2020-03/28/2020- extended to 04/17/2020 due to pt being out of town for a week.    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 Measured in centimeters.    2/14/2020 2/14/2020 3/2/2020     Right Left Left   Wrist Crease 15.2 16.4 15.9   MCPs 18.5 18.5 17.8            Elbow and Wrist ROM. Measured in degrees.    2/14/2020 2/14/2020 3/2/2020     Right Left Left                     Wrist Ext/Flex 55/80 65/75 *pain reported 65/82 *with pain   Wrist RD/UD 20/25 16/30 18/42      Hand ROM. Measured in degrees.    2/14/2020 2/14/2020 3/2/2020     Right Left Left                     Thumb: MP 65 69 70 (+1)                " IP 0 17 75 (+58)       Rad ADD/ABD 40 40 50       Pal ADD/ABD 45 45 45          Opposition SF MP crease SF MPcrease To DPC       Strength (Dynamometer) and Pinch Strength (Pinch Gauge)  Measured in pounds.    2/14/2020 2/14/2020 3/2/2020     Right Left Left   Rung II 42 7 38 (+31)   Scott Pinch 12 5 7 (+2) *pain in thenar   3pt Pinch 13 5 7 (+2) *pain in thenar      Sensation : shooting pain up the arm and numbness in the LF on left     Melani received the following supervised modalities after being cleared for contradictions for 10 minutes:   Patient received moist heat pack to L hand(s) for 10 minutes to increase blood flow, circulation, pain management and for tissue elasticity prior to therex. (paraffin not available)       Melani received the following manual therapy techniques for 10 minutes:    --Performed Retrograde massage to left fingers/hand/wrist to decrease edema, improve joint mobility, decrease pain and improve lymphatic drainage to increase hand function.   -Performed scar massage to incision with and without hawks  and mini vibratory scar massager to decrease adhesions and improve tensile glide.       Melani received therapeutic exercises for 35  minutes including:  -  AROM Wrist  Ext/flx  RD/UD    X 10 reps each    Wave, hook, straight fist, composite fist, finger spreads, finger lifts, pinky slides        X 10 reps each    Prayer stretch X 30 sec x 3   Wrist Maze X 3 min   In hand manipulation with coins X 1 containers   Isospheres X 3 min   Wrist PRE X 3/10 x 2# 3 ways   Clothespin, green, 4#, pompom , lateral key and tripod pinch X 20 reps each, no pompoms today   Flexbar, red, smiles/frowns X 10 reps each   PHG, 2nd rung, gold spring 2 X 10 reps   Digit extension, red rubber band 2 x 10 reps   Peach Putty  X 3 min  X 15 squeezes  X 5 pancake/blooms  X 3 logs 3 pt pinch  X 3 logs lat pinch       Patient received cold pack x 5 minutes to decrease pain/inflammation and edema  following treatment session.       Home Exercises and Education Provided     Education provided: Cont HEP, can stretch and massage wrist to help with tightness, increase scar massage, prayer stretch lighter to not increase pain. Peach theraputty 3 x week NOT EVERYDAY.  - Progress towards goals     Written Home Exercises Provided: yes.  Exercises were reviewed and Melani was able to demonstrate them prior to the end of the session.  Melani demonstrated good  understanding of the education provided.   .   See EMR under Media for exercises provided 02/28/2020.     Assessment    Pt tolerated tx fairly well today.  Still having complaints of pain and discomfort. Educated to alter HEP into pain free range but importance of exercise. Pt understood.  Good carryover and tolerance of exercises today with no difficulty during session. States the gripper gives her the most pain. She still states difficulty with work and cannot work due to the hand.     Melani is progressing fairly towards her goals and there are no updates to goals at this time. Pt prognosis continues as Fair. Pt will continue to benefit from skilled outpatient occupational therapy to address the deficits listed in the problem list on initial evaluation, provide pt/family education and to maximize pt's level of independence in the home and community environment.     Anticipated barriers to continued occupational therapy: high pain reports, pt's understanding of pain levels    Pt's spiritual, cultural and educational needs considered and pt agreeable to plan of care and goals.    Goals   Goals:   The following goals were discussed with the patient and patient is in agreement with them as to be addressed in the treatment plan.   Long Term Goals (LTGs); to be met by discharge.  LTG #1: Pt will report a pain level of 4 out of 10 with ADLs -progressing  LTG #2: Pt will demo improved FOTO score by 20 points. -progressing  LTG #3: Pt will return to prior level of  function for ADLs and household management. -progressing     Short Term Goals (STGs); to be met within 4 weeks (03/13/2020).  STG #1a: Pt will report 6 out of 10 pain level with ADLs.-progressing  STG #2a: Pt will report/demo [place] with opening car door with left hand.-progressing  STG #2b: Pt will report/demo [place] with dressing/undressing with left hand.-progressing  STG #3a: Pt will demonstrate independence with issued HEP-progressing.   STG #3b: Pt will demo improved  strength by 5 # in order to increase strength for return to work tasks.---met    Plan     Continue skilled occupational therapy with individualized plan of care focusing on return to function and pain relief.       Updates/Grading for next session: cont to progress as able,  Increasing strengthening as able.     Alex Tripathi, OTR/L

## 2020-03-23 ENCOUNTER — CLINICAL SUPPORT (OUTPATIENT)
Dept: REHABILITATION | Facility: HOSPITAL | Age: 30
End: 2020-03-23
Payer: MEDICAID

## 2020-03-23 DIAGNOSIS — M79.642 PAIN IN BOTH HANDS: ICD-10-CM

## 2020-03-23 DIAGNOSIS — R29.898 WEAKNESS OF BOTH HANDS: ICD-10-CM

## 2020-03-23 DIAGNOSIS — M79.641 PAIN IN BOTH HANDS: ICD-10-CM

## 2020-03-23 PROCEDURE — 97530 THERAPEUTIC ACTIVITIES: CPT | Mod: PN

## 2020-03-23 NOTE — PROGRESS NOTES
"  Occupational Therapy Daily Treatment Note      Date: 3/23/2020  Name: Melani Sloan  Clinic Number: [id number]    Therapy Diagnosis:   Encounter Diagnoses   Name Primary?    Pain in both hands     Weakness of both hands      Physician: Dr. Idalmis Waddell    Physician Orders: eval and treat focus mostly on the left wrist as it is post operative  Medical Diagnosis: Left carpal tunnel release and right wrist tendinitis  Surgical Procedure and Date: 01/24/2020, Carpal tunnel release  Evaluation Date: 02/14/2020  Insurance Authorization Period Expiration: 04/17/2020  Plan of Care Certification Period: 02/14/2020-03/28/2020- extended to 04/17/2020 due to pt being out of town for a week.    Date of Return to MD: 04/14/2020     Visit # / Visits authorized: 9 / 17      FOTO: initial eval, 7th visit: 38% limitation (31 points improved)        Time In: 9:50 am  Time Out: 10:50 am  Total treatment time: 60 minutes  Total Timed minutes 45 minutes   TA x 3     Precautions:  Standard strengthening at 5-6 weeks post op (02/28/2020 = 5 weeks)    Subjective     Pt reports: "I'm having some pain in my hand today. I think it was from using that gripper last time. And I feel like my wrist is stiff."   she was compliant with home exercise program given last session.   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 Measured in centimeters.    2/14/2020 2/14/2020 3/2/2020     Right Left Left   Wrist Crease 15.2 16.4 15.9   MCPs 18.5 18.5 17.8            Elbow and Wrist ROM. Measured in degrees.    2/14/2020 2/14/2020 3/2/2020 3/23/2020     Right Left Left Left                       Wrist Ext/Flex 55/80 65/75 *pain reported 65/82 *with pain 54/64 (-11/-18)   Wrist RD/UD 20/25 16/30 18/42 26/36 (+8/-6)      Hand ROM. Measured in degrees.    " 2/14/2020 2/14/2020 3/2/2020     Right Left Left                     Thumb: MP 65 69 70 (+1)                IP 0 17 75 (+58)       Rad ADD/ABD 40 40 50       Pal ADD/ABD 45 45 45          Opposition SF MP crease SF MPcrease To DPC       Strength (Dynamometer) and Pinch Strength (Pinch Gauge)  Measured in pounds.    2/14/2020 2/14/2020 3/2/2020 3/23/2020     Right Left Left Left   Rung II 42 7 38 (+31) 34 (-4) with pain   Key Pinch 12 5 7 (+2) *pain in thenar 12 (+5)   3pt Pinch 13 5 7 (+2) *pain in thenar 15 (+8)      Sensation : shooting pain up the arm and numbness in the LF on left     Melani received the following supervised modalities after being cleared for contradictions for 10 minutes:   Patient received moist heat pack to L hand(s) for 10 minutes to increase blood flow, circulation, pain management and for tissue elasticity prior to therex. (paraffin & fluido not available)       Melani received the following manual therapy techniques for 10 minutes:    --Performed Retrograde massage to left fingers/hand/wrist to decrease edema, improve joint mobility, decrease pain and improve lymphatic drainage to increase hand function.   -Performed scar massage to incision with and without hawks  and mini vibratory scar massager to decrease adhesions and improve tensile glide.       Melani received therapeutic exercises for 35  minutes including:  -  AROM Wrist  Ext/flx  RD/UD    X 10 reps each    Wave, hook, straight fist, composite fist, finger spreads, finger lifts, pinky slides        X 10 reps each    Prayer stretch X 30 sec x 3   Wrist Maze X 3 min NOT PERFORMED THIS TREATMENT     In hand manipulation with coins X 1 containers NOT PERFORMED THIS TREATMENT     Isospheres X 3 min   Wrist PRE X 2/15 x 2# 3 ways   Clothespin, green, 4#, pompom , lateral key and tripod pinch X 20 reps each, no pompoms today   Flexbar, red, smiles/frowns X 10 reps each NOT PERFORMED THIS TREATMENT     PHG, 2nd rung,  gold spring 2 X 10 reps NOT PERFORMED THIS TREATMENT     Digit extension, red rubber band 2 x 10 reps   Supination/pronation with hammer 2 x 10 reps   Peach Putty  X 3 min  X 15 squeezes  X 5 pancake/blooms  X 3 logs 3 pt pinch  X 3 logs lat pinch       Patient received cold pack x 5 minutes to decrease pain/inflammation and edema following treatment session.       Home Exercises and Education Provided     Education provided: Cont HEP, can stretch and massage wrist to help with tightness, increase scar massage, prayer stretch lighter to not increase pain. Peach theraputty 3 x week NOT EVERYDAY. HEP reviewed. Added wrist PRE to HEP  - Progress towards goals     Written Home Exercises Provided: yes. Added wrist PRE to HEP.   Exercises were reviewed and Melani was able to demonstrate them prior to the end of the session.  Melani demonstrated good  understanding of the education provided.   .   See EMR under Media for exercises provided 3/23/2020.     Assessment    Pt tolerated tx fairly well today.  Still having complaints of pain and discomfort in palm and thumb. Reviewed  altering HEP into pain free range but importance of exercise. Pt verbalized understanding.  Good carryover and tolerance of exercises today with no difficulty during session. Deferred gripper today due to pain. She still states difficulty with work and cannot work due to the hand.     Melani is progressing fairly towards her goals and there are no updates to goals at this time. Pt prognosis continues as Fair. Pt will continue to benefit from skilled outpatient occupational therapy to address the deficits listed in the problem list on initial evaluation, provide pt/family education and to maximize pt's level of independence in the home and community environment.     Anticipated barriers to continued occupational therapy: high pain reports, pt's understanding of pain levels    Pt's spiritual, cultural and educational needs considered and pt  agreeable to plan of care and goals.    Goals   Goals:   The following goals were discussed with the patient and patient is in agreement with them as to be addressed in the treatment plan.   Long Term Goals (LTGs); to be met by discharge.  LTG #1: Pt will report a pain level of 4 out of 10 with ADLs -progressing  LTG #2: Pt will demo improved FOTO score by 20 points. -progressing  LTG #3: Pt will return to prior level of function for ADLs and household management. -progressing     Short Term Goals (STGs); to be met within 4 weeks (03/13/2020).  STG #1a: Pt will report 6 out of 10 pain level with ADLs.-progressing  STG #2a: Pt will report/demo [place] with opening car door with left hand.-progressing  STG #2b: Pt will report/demo [place] with dressing/undressing with left hand.-progressing  STG #3a: Pt will demonstrate independence with issued HEP-progressing.   STG #3b: Pt will demo improved  strength by 5 # in order to increase strength for return to work tasks.---met    Plan       Spoke with patient due to therapy following updates regarding COVID-19 closely and taking every precaution to ensure the safety of our patients, staff and community.  In an abundance of caution and in an effort to help reduce risk and limit community spread, we have decided to temporarily postpone appointments for patients who may be at increased risk to attend in-person therapy or where therapy is not critically needed at this time. Plan of care and home exercise program were reviewed and patient has what they need to continue therapy at home. All patient questions were answered. Also stated to patient that we are exploring virtual methods of providing care and will be in touch over the next few weeks. Patient verbalized understanding to all.      Corinna Lenz OTR/KIRSTIN

## 2020-03-23 NOTE — PATIENT INSTRUCTIONS
[facility]  OCCUPATIONAL THERAPY  HOME EXERCISE PROGRAM     Complete the following strengthening exercises using 1-2 pound weight.  Do 2 sets of 10 repetitions of each, 1x per day.     Resisted Forearm Rotation  Use a weight or a hammer. Slowly rotate hand to one side then the other.      Resisted Wrist Flexion  With palm up and weight in hand, bend wrist up. Return slowly.      Resisted Wrist Extension  With palm down and weight in hand, bend wrist up. Then bend wrist down.      Resisted Wrist Deviation  With thumb up and weight in hand, bend wrist up. Return slowly.    Copyright © I. All rights reserved.     Therapist: PAPITO Anders

## 2020-03-25 ENCOUNTER — PATIENT MESSAGE (OUTPATIENT)
Dept: REHABILITATION | Facility: HOSPITAL | Age: 30
End: 2020-03-25

## 2020-03-31 ENCOUNTER — TELEPHONE (OUTPATIENT)
Dept: REHABILITATION | Facility: HOSPITAL | Age: 30
End: 2020-03-31

## 2020-03-31 ENCOUNTER — PATIENT MESSAGE (OUTPATIENT)
Dept: REHABILITATION | Facility: HOSPITAL | Age: 30
End: 2020-03-31

## 2020-03-31 NOTE — TELEPHONE ENCOUNTER
Postponed Appointments    Patient: Melani Sloan  Date: 3/31/2020  Diagnosis: Left carpal tunnel release and right wrist tendinitis  MRN: [id number]    Called and spoke with patient to check in and follow up with patient on how she is doing with home exercise program. Pt had some questions and concerns, reporting pain with certain exercises. Therapist discussed keeping exercise pain free, and discussed alternate ways to perform certain exercises. Pt verbalized understanding of education.  Reviewed with patient that therapy is following updates regarding COVID-19 closely and taking every precaution to ensure the safety of our patients, staff and community.  In an abundance of caution and in an effort to help reduce risk and limit community spread, we have decided to temporarily postpone appointments for patients who may be at increased risk to attend in-person therapy or where therapy is not critically needed at this time until the end of April. Plan of care and home exercise program were reviewed and patient has what they need to continue therapy at home. All patient questions were answered. Also stated to patient that we are exploring virtual methods of providing care. Patient reported she may be interested in that. Patient verbalized understanding to all.      3/31/2020  PAPITO Anders

## 2020-04-02 ENCOUNTER — TELEPHONE (OUTPATIENT)
Dept: REHABILITATION | Facility: HOSPITAL | Age: 30
End: 2020-04-02

## 2020-04-02 NOTE — TELEPHONE ENCOUNTER
Postponed Appointments    Patient: Melani Sloan  Date: 4/2/2020  Diagnosis: Left carpal tunnel release and right wrist tendinitis  MRN: [id number]    Called patient to check in on how she is doing with her OT home exercise program. Plan of care and home exercise program were reviewed and patient has what they need to continue therapy at home. Patient had questions regarding pain and resistive exercise. Discussed performing exercise in pain free range, and rest when needed. All patient questions were answered.       4/2/2020  PAPITO Anders

## 2020-04-14 ENCOUNTER — PATIENT MESSAGE (OUTPATIENT)
Dept: REHABILITATION | Facility: HOSPITAL | Age: 30
End: 2020-04-14

## 2020-04-15 ENCOUNTER — TELEPHONE (OUTPATIENT)
Dept: REHABILITATION | Facility: HOSPITAL | Age: 30
End: 2020-04-15

## 2020-04-15 NOTE — TELEPHONE ENCOUNTER
"Spoke with pt regarding telehealth visits. She contacted her therapist via [employer] who has recently been deployed. Asked to speak to her and requested to see her but was educated of therapy process at this point due to COVID- 19 protocol. Pt perseverated on needing to come in immediately to test her "pressure". Pt referring to the pressure gauge in clinic which in her past measurements continues to show inconsistencies in performance. Pt also concerned with not having proper equipment at home. She was educated to use a water bottle for wrist exercises at home but states that she needed help and has pain with certain movements but doesn't know if she is doing it right. Again educated to schedule telehealth visit in order for OT to educated of proper exercises and techs to complete pain free. Pt has been attending therapy since 2018 on and off. She has shown good carryover and ability to perform exercises in clinic every session. OT unsure of difficulty at home at this time. She states she will call back if she wants telehealth services.   "

## 2020-04-17 ENCOUNTER — PATIENT MESSAGE (OUTPATIENT)
Dept: REHABILITATION | Facility: HOSPITAL | Age: 30
End: 2020-04-17

## 2020-04-27 ENCOUNTER — TELEPHONE (OUTPATIENT)
Dept: REHABILITATION | Facility: HOSPITAL | Age: 30
End: 2020-04-27

## 2020-05-18 ENCOUNTER — TELEPHONE (OUTPATIENT)
Dept: REHABILITATION | Facility: HOSPITAL | Age: 30
End: 2020-05-18

## 2020-05-18 NOTE — TELEPHONE ENCOUNTER
Resume Appointments    Patient: Melani Sloan  Date: 5/18/2020  MRN: [id number]    Called pt on behalf of treating therapist, Corinna.  Spoke with patient following updates regarding COVID-19.  Offered resuming in person therapy visits.  Pt agreeable. Scheduled.    5/18/2020  Caitlin Doe CCC-SLP

## 2021-02-22 ENCOUNTER — CLINICAL SUPPORT (OUTPATIENT)
Dept: REHABILITATION | Facility: HOSPITAL | Age: 31
End: 2021-02-22
Payer: MEDICAID

## 2021-02-22 DIAGNOSIS — M25.532 ARTHRALGIA OF LEFT WRIST: ICD-10-CM

## 2021-02-22 DIAGNOSIS — M62.81 MUSCLE WEAKNESS: ICD-10-CM

## 2021-02-22 DIAGNOSIS — M25.60 STIFFNESS IN JOINT: ICD-10-CM

## 2021-02-22 PROBLEM — R29.898 WEAKNESS OF BOTH HANDS: Status: RESOLVED | Noted: 2020-02-14 | Resolved: 2021-02-22

## 2021-02-22 PROBLEM — M79.641 PAIN IN BOTH HANDS: Status: RESOLVED | Noted: 2020-02-14 | Resolved: 2021-02-22

## 2021-02-22 PROBLEM — M79.642 PAIN IN BOTH HANDS: Status: RESOLVED | Noted: 2020-02-14 | Resolved: 2021-02-22

## 2021-02-22 PROCEDURE — L3906 WHO W/O JOINTS CF: HCPCS | Mod: PN

## 2021-03-04 ENCOUNTER — CLINICAL SUPPORT (OUTPATIENT)
Dept: REHABILITATION | Facility: HOSPITAL | Age: 31
End: 2021-03-04
Payer: MEDICAID

## 2021-03-04 DIAGNOSIS — M25.60 STIFFNESS IN JOINT: ICD-10-CM

## 2021-03-04 DIAGNOSIS — M25.532 ARTHRALGIA OF LEFT WRIST: ICD-10-CM

## 2021-03-04 DIAGNOSIS — M62.81 MUSCLE WEAKNESS: ICD-10-CM

## 2021-03-04 PROCEDURE — 97165 OT EVAL LOW COMPLEX 30 MIN: CPT | Mod: PN

## 2021-03-09 ENCOUNTER — CLINICAL SUPPORT (OUTPATIENT)
Dept: REHABILITATION | Facility: HOSPITAL | Age: 31
End: 2021-03-09
Payer: MEDICAID

## 2021-03-09 DIAGNOSIS — M62.81 MUSCLE WEAKNESS: ICD-10-CM

## 2021-03-09 DIAGNOSIS — M25.532 ARTHRALGIA OF LEFT WRIST: ICD-10-CM

## 2021-03-09 DIAGNOSIS — M25.60 STIFFNESS IN JOINT: ICD-10-CM

## 2021-03-09 PROCEDURE — 97530 THERAPEUTIC ACTIVITIES: CPT | Mod: PN

## 2021-03-11 ENCOUNTER — CLINICAL SUPPORT (OUTPATIENT)
Dept: REHABILITATION | Facility: HOSPITAL | Age: 31
End: 2021-03-11
Payer: MEDICAID

## 2021-03-11 DIAGNOSIS — M25.60 STIFFNESS IN JOINT: ICD-10-CM

## 2021-03-11 DIAGNOSIS — M25.532 ARTHRALGIA OF LEFT WRIST: ICD-10-CM

## 2021-03-11 DIAGNOSIS — M62.81 MUSCLE WEAKNESS: ICD-10-CM

## 2021-03-11 PROCEDURE — 97530 THERAPEUTIC ACTIVITIES: CPT | Mod: PN

## 2021-03-16 ENCOUNTER — CLINICAL SUPPORT (OUTPATIENT)
Dept: REHABILITATION | Facility: HOSPITAL | Age: 31
End: 2021-03-16
Payer: MEDICAID

## 2021-03-16 DIAGNOSIS — M25.60 STIFFNESS IN JOINT: ICD-10-CM

## 2021-03-16 DIAGNOSIS — M62.81 MUSCLE WEAKNESS: ICD-10-CM

## 2021-03-16 DIAGNOSIS — M25.532 ARTHRALGIA OF LEFT WRIST: ICD-10-CM

## 2021-03-16 PROCEDURE — 97530 THERAPEUTIC ACTIVITIES: CPT | Mod: PN

## 2021-03-18 ENCOUNTER — CLINICAL SUPPORT (OUTPATIENT)
Dept: REHABILITATION | Facility: HOSPITAL | Age: 31
End: 2021-03-18
Payer: MEDICAID

## 2021-03-18 DIAGNOSIS — M25.60 STIFFNESS IN JOINT: ICD-10-CM

## 2021-03-18 DIAGNOSIS — M62.81 MUSCLE WEAKNESS: ICD-10-CM

## 2021-03-18 DIAGNOSIS — M25.532 ARTHRALGIA OF LEFT WRIST: ICD-10-CM

## 2021-03-18 PROCEDURE — 97530 THERAPEUTIC ACTIVITIES: CPT | Mod: PN

## 2021-03-20 ENCOUNTER — IMMUNIZATION (OUTPATIENT)
Dept: PRIMARY CARE CLINIC | Facility: CLINIC | Age: 31
End: 2021-03-20

## 2021-03-20 DIAGNOSIS — Z23 NEED FOR VACCINATION: Primary | ICD-10-CM

## 2021-03-20 PROCEDURE — 91300 PR SARS-COV- 2 COVID-19 VACCINE, NO PRSV, 30MCG/0.3ML, IM: CPT | Mod: S$GLB,,, | Performed by: INTERNAL MEDICINE

## 2021-03-20 PROCEDURE — 0001A PR IMMUNIZ ADMIN, SARS-COV-2 COVID-19 VACC, 30MCG/0.3ML, 1ST DOSE: ICD-10-PCS | Mod: CV19,S$GLB,, | Performed by: INTERNAL MEDICINE

## 2021-03-20 PROCEDURE — 0001A PR IMMUNIZ ADMIN, SARS-COV-2 COVID-19 VACC, 30MCG/0.3ML, 1ST DOSE: CPT | Mod: CV19,S$GLB,, | Performed by: INTERNAL MEDICINE

## 2021-03-20 PROCEDURE — 91300 PR SARS-COV- 2 COVID-19 VACCINE, NO PRSV, 30MCG/0.3ML, IM: ICD-10-PCS | Mod: S$GLB,,, | Performed by: INTERNAL MEDICINE

## 2021-03-20 RX ADMIN — Medication 0.3 ML: at 09:03

## 2021-03-23 ENCOUNTER — CLINICAL SUPPORT (OUTPATIENT)
Dept: REHABILITATION | Facility: HOSPITAL | Age: 31
End: 2021-03-23
Payer: MEDICAID

## 2021-03-23 DIAGNOSIS — M25.60 STIFFNESS IN JOINT: ICD-10-CM

## 2021-03-23 DIAGNOSIS — M25.532 ARTHRALGIA OF LEFT WRIST: ICD-10-CM

## 2021-03-23 DIAGNOSIS — M62.81 MUSCLE WEAKNESS: ICD-10-CM

## 2021-03-23 PROCEDURE — 97530 THERAPEUTIC ACTIVITIES: CPT | Mod: PN

## 2021-03-25 ENCOUNTER — CLINICAL SUPPORT (OUTPATIENT)
Dept: REHABILITATION | Facility: HOSPITAL | Age: 31
End: 2021-03-25
Payer: MEDICAID

## 2021-03-25 DIAGNOSIS — M62.81 MUSCLE WEAKNESS: ICD-10-CM

## 2021-03-25 DIAGNOSIS — M25.532 ARTHRALGIA OF LEFT WRIST: ICD-10-CM

## 2021-03-25 DIAGNOSIS — M25.60 STIFFNESS IN JOINT: ICD-10-CM

## 2021-03-25 PROCEDURE — 97530 THERAPEUTIC ACTIVITIES: CPT | Mod: PN

## 2021-03-30 ENCOUNTER — CLINICAL SUPPORT (OUTPATIENT)
Dept: REHABILITATION | Facility: HOSPITAL | Age: 31
End: 2021-03-30
Payer: MEDICAID

## 2021-03-30 DIAGNOSIS — M25.532 ARTHRALGIA OF LEFT WRIST: ICD-10-CM

## 2021-03-30 DIAGNOSIS — M25.60 STIFFNESS IN JOINT: ICD-10-CM

## 2021-03-30 DIAGNOSIS — M62.81 MUSCLE WEAKNESS: ICD-10-CM

## 2021-03-30 PROCEDURE — 97530 THERAPEUTIC ACTIVITIES: CPT | Mod: PN

## 2021-04-11 ENCOUNTER — IMMUNIZATION (OUTPATIENT)
Dept: PRIMARY CARE CLINIC | Facility: CLINIC | Age: 31
End: 2021-04-11
Payer: MEDICAID

## 2021-04-11 DIAGNOSIS — Z23 NEED FOR VACCINATION: Primary | ICD-10-CM

## 2021-04-11 PROCEDURE — 91300 PR SARS-COV- 2 COVID-19 VACCINE, NO PRSV, 30MCG/0.3ML, IM: CPT | Mod: S$GLB,,, | Performed by: INTERNAL MEDICINE

## 2021-04-11 PROCEDURE — 0002A PR IMMUNIZ ADMIN, SARS-COV-2 COVID-19 VACC, 30MCG/0.3ML, 2ND DOSE: ICD-10-PCS | Mod: CV19,S$GLB,, | Performed by: INTERNAL MEDICINE

## 2021-04-11 PROCEDURE — 0002A PR IMMUNIZ ADMIN, SARS-COV-2 COVID-19 VACC, 30MCG/0.3ML, 2ND DOSE: CPT | Mod: CV19,S$GLB,, | Performed by: INTERNAL MEDICINE

## 2021-04-11 PROCEDURE — 91300 PR SARS-COV- 2 COVID-19 VACCINE, NO PRSV, 30MCG/0.3ML, IM: ICD-10-PCS | Mod: S$GLB,,, | Performed by: INTERNAL MEDICINE

## 2021-04-11 RX ADMIN — Medication 0.3 ML: at 08:04

## 2021-04-13 ENCOUNTER — CLINICAL SUPPORT (OUTPATIENT)
Dept: REHABILITATION | Facility: HOSPITAL | Age: 31
End: 2021-04-13
Payer: MEDICAID

## 2021-04-13 DIAGNOSIS — M25.532 ARTHRALGIA OF LEFT WRIST: ICD-10-CM

## 2021-04-13 DIAGNOSIS — M62.81 MUSCLE WEAKNESS: ICD-10-CM

## 2021-04-13 DIAGNOSIS — M25.60 STIFFNESS IN JOINT: ICD-10-CM

## 2021-04-13 PROCEDURE — 97530 THERAPEUTIC ACTIVITIES: CPT | Mod: PN

## 2021-04-21 ENCOUNTER — CLINICAL SUPPORT (OUTPATIENT)
Dept: REHABILITATION | Facility: HOSPITAL | Age: 31
End: 2021-04-21
Payer: MEDICAID

## 2021-04-21 DIAGNOSIS — M62.81 MUSCLE WEAKNESS: ICD-10-CM

## 2021-04-21 DIAGNOSIS — M25.532 ARTHRALGIA OF LEFT WRIST: ICD-10-CM

## 2021-04-21 DIAGNOSIS — M25.60 STIFFNESS IN JOINT: ICD-10-CM

## 2021-04-21 PROCEDURE — 97530 THERAPEUTIC ACTIVITIES: CPT | Mod: PN

## 2021-04-27 ENCOUNTER — CLINICAL SUPPORT (OUTPATIENT)
Dept: REHABILITATION | Facility: HOSPITAL | Age: 31
End: 2021-04-27
Payer: MEDICAID

## 2021-04-27 DIAGNOSIS — M62.81 MUSCLE WEAKNESS: ICD-10-CM

## 2021-04-27 DIAGNOSIS — M25.532 ARTHRALGIA OF LEFT WRIST: ICD-10-CM

## 2021-04-27 DIAGNOSIS — M25.60 STIFFNESS IN JOINT: ICD-10-CM

## 2021-04-27 PROCEDURE — 97530 THERAPEUTIC ACTIVITIES: CPT | Mod: PN

## 2021-04-29 ENCOUNTER — CLINICAL SUPPORT (OUTPATIENT)
Dept: REHABILITATION | Facility: HOSPITAL | Age: 31
End: 2021-04-29
Payer: MEDICAID

## 2021-04-29 DIAGNOSIS — M25.532 ARTHRALGIA OF LEFT WRIST: ICD-10-CM

## 2021-04-29 DIAGNOSIS — M62.81 MUSCLE WEAKNESS: ICD-10-CM

## 2021-04-29 DIAGNOSIS — M25.60 STIFFNESS IN JOINT: ICD-10-CM

## 2021-04-29 PROCEDURE — 97530 THERAPEUTIC ACTIVITIES: CPT | Mod: PN

## 2021-05-04 ENCOUNTER — CLINICAL SUPPORT (OUTPATIENT)
Dept: REHABILITATION | Facility: HOSPITAL | Age: 31
End: 2021-05-04
Payer: MEDICAID

## 2021-05-04 DIAGNOSIS — M25.60 STIFFNESS IN JOINT: ICD-10-CM

## 2021-05-04 DIAGNOSIS — M62.81 MUSCLE WEAKNESS: ICD-10-CM

## 2021-05-04 DIAGNOSIS — M25.532 ARTHRALGIA OF LEFT WRIST: ICD-10-CM

## 2021-05-04 PROCEDURE — 97530 THERAPEUTIC ACTIVITIES: CPT | Mod: PN

## 2022-06-30 ENCOUNTER — CLINICAL SUPPORT (OUTPATIENT)
Dept: REHABILITATION | Facility: HOSPITAL | Age: 32
End: 2022-06-30
Attending: SURGERY
Payer: MEDICAID

## 2022-06-30 DIAGNOSIS — M25.532 ARTHRALGIA OF LEFT WRIST: ICD-10-CM

## 2022-06-30 DIAGNOSIS — M19.041 ARTHRITIS OF BOTH HANDS: Primary | ICD-10-CM

## 2022-06-30 DIAGNOSIS — M19.042 ARTHRITIS OF BOTH HANDS: Primary | ICD-10-CM

## 2022-06-30 DIAGNOSIS — M25.60 STIFFNESS IN JOINT: ICD-10-CM

## 2022-06-30 DIAGNOSIS — M62.81 MUSCLE WEAKNESS: Primary | ICD-10-CM

## 2022-06-30 PROCEDURE — 97165 OT EVAL LOW COMPLEX 30 MIN: CPT | Mod: PN

## 2022-07-01 NOTE — PLAN OF CARE
[facility] Occupational Therapy  Initial Evaluation     Date: 6/30/2022  Patient: Melani Sloan  Chart Number: [id number]  Referring Physician: Satish Clark MD  Therapy Diagnosis:   1. Muscle weakness     2. Arthralgia of left wrist     3. Stiffness in joint         Physician Orders: OT eval and treat  Medical Diagnosis: M19.041 (ICD-10-CM) - Arthritis of right hand     Evaluation Date: 6/30/2022  Plan of Care Certification Date: 8/12/2022  Authorization Period: 12/31/2022  Surgery Date and Procedure: none  Date of Return to MD: unknown    Visit #: 1 of 1  Time In: 2:45  Time Out: 3:15  Total Billable Time: 30 minutes     Precautions: Standard , seizures, pacemaker     Subjective     History of Current Condition: Melani reports she has noted stiffness and pain in B hands, more noticeable in her R, over the last few weeks. 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 Melani is having a hard time with work related tasks as a  due to the pain and stiffness in B hands.     Involved Side: B hands   Dominant Side: Right   Date of Onset: about a week or two ago; chronic condition   Imaging: none   Previous Therapy: Pt has received occupational therapy at this facility multiple times over the last few years for this diagnosis     Patient's Goals for Therapy: get more strength in B hands     Pain:  Functional Pain Scale Rating 0-10:   7/10 on average  5/10 at best  7/10 at worst  Location: R hand, hypothenar eminence   Description: tightness, stiff  Aggravating Factors: overuse   Easing Factors: ice    Occupation:     Working presently: employed  Duties: washing dogs, cutting, handling leashes     Functional Limitations/Social History:    Prior Level of Function: independent   Current Level of Function:independent     Home/Living environment : lives with their family  Home Access: [facility]  DME: none     Leisure: working  out    Driving: currently driving     Past Medical History/Physical Systems Review:   Melani Sloan  has a past medical history of Seizures.    Melani Sloan  has a past surgical history that includes Vagus nerve stimulator insertion.    Mealni has a current medication list which includes the following prescription(s): brivaracetam, brivaracetam, felbamate, hydrocodone-acetaminophen, naproxen, and zonisamide.    Review of patient's allergies indicates:   Allergen Reactions    Benadryl [diphenhydramine hcl]      Drug interactions          Objective     Mental status: oriented x3    Observation:   Full ROM in B hands, no swelling present     Edema. Measured in centimeters.    4/29/21 4/29/21 5/4/21 5/421 6/30/2022 6/30/2022     R L R L R L   Wrist Crease 14.6 15.2 14.8 14.8 15 14.8   Distal Palmar Crease 17.5 17 17.5 16.8 17.5 17.1       Strength (Dynamometer) and Pinch Strength (Pinch Gauge)  Measured in pounds.    4/29/21 4/29/21 5/4/21 5/4/21 6/30/2022 6/30/2022     R L L R R L   Rung II 43 56 46 50.3 23# 31#   Scott Pinch 11 15 16 17 10 12   3pt Pinch 14 13 15 15 10 9        10 10     Manual Muscle Testing:    [facility]   6/30/2022  Right [facility]   6/30/2022  Left   Wrist flexion  4-/5 4/5   Wrist extension 4/5 4/5   Radial deviation  3/5  5/10 pain  3+/5   Ulnar deviation  3/5 3+/5   Digit flexion 4+/5 4+/5   Digit extension  4+/5 4+/5     Fine Motor Coordination: 9 Hole Peg Test  Left 6/30/2022 Right   6/30/2022    :33 sec  :29 sec      CMS Impairment/Limitation/Restriction for FOTO Hand/Wrist/Finger Survey    Not administered on eval; pt unable to stay after eval due to work        Treatment     Home Exercise Program/Education:  Issued HEP (see patient instructions in EMR) and educated on modality use for pain management . Exercises were reviewed and Melani was able to demonstrate them prior to the end of the session.   Pt received a written copy of exercises to perform at home. Melani demonstrated good   understanding of the education provided.  Pt was advised to perform these exercises free of pain, and to stop performing them if pain occurs.    Patient/Family Education: role of OT, goals for OT, scheduling/cancellations - pt verbalized understanding. Discussed insurance limitations with patient    Assessment     Melani Sloan is a 32 y.o. female presents with limitations as described in problem list. Patient can benefit from Occupational Therapy services for Iontophoresis, ultrasound, moist heat, therapeutic exercises, home exercise program provied with written instructions, ice and strengthening and orthotics, if deemed necessary . The following goals were discussed with the patient and she is in agreement with them as to be addressed in the treatment plan.    The patient's rehab potential is Fair.     Anticipated barriers to occupational therapy: chronicity of deficits, progress with therapy during past therapy admissions   Pt has no cultural, educational or language barriers to learning provided.    Profile and History Assessment of Occupational Performance Level of Clinical Decision Making Complexity Score   Occupational Profile:   Melani Sloan is a 32 y.o. female who is currently employed Melani Sloan has difficulty with  ADLs and IADLs as listed previously, which  affecting his/her daily functional abilities.      Comorbidities:    has a past medical history of Seizures.    Medical and Therapy History Review:   Brief               Performance Deficits    Physical:  Joint Stability  Muscle Power/Strength  Muscle Endurance   Strength  Pinch Strength  Pain    Cognitive:  No Deficits    Psychosocial:    Habits  Routines  Rituals     Clinical Decision Making:  low    Assessment Process:  Problem-Focused Assessments    Modification/Need for Assistance:  Not Necessary    Intervention Selection:  Limited Treatment Options       low  Based on PMHX, co morbidities , data from assessments and functional  level of assistance required with task and clinical presentation directly impacting function.       Goals:     Short term Goals:  1) Initiate HEP  2) Pt will reduce pain to less than 4/10 in B hands  3) Pt will increase functional  strength by 5# in order to A in opening containers for med management or home management tasks  4) Pt will report compliance with orthotic use and will verbalize understanding of joint protection strategies to manage arthritis      Long Term Goals:  Goals to be met by discharge:  1) Independent with HEP  2) Pt will decrease pain to trace or none while completing light home management tasks or work related tasks by d/c.   3) Patient will be able to achieve less than or equal to 30% on the FOTO, demonstrating overall improved functional ability with upper extremity.  4) Pt will improve R/L  strength to 45# or better in order to better manage grooming tools   5) Pt will report improved use of B hands during work related tasks     Plan     Pt to be treated by Occupational Therapy 2 times per week for 6 weeks during the certification period from 6/30/2022 to 8/12/2022 to achieve the established goals.     Treatment to include: Paraffin, Fluidotherapy, Manual therapy/joint mobilizations, Modalities for pain management, Therapeutic exercises/activities., Strengthening, Orthotic Fabrication/Fit/Training, Edema Control, Electrical Modalities, Joint Protection and Energy Conservation, as well as any other treatments deemed necessary based on the patient's needs or progress.     Corinne Rapier, OTR/L   6/30/2022

## 2022-07-15 ENCOUNTER — CLINICAL SUPPORT (OUTPATIENT)
Dept: REHABILITATION | Facility: HOSPITAL | Age: 32
End: 2022-07-15
Payer: MEDICAID

## 2022-07-15 DIAGNOSIS — M25.532 ARTHRALGIA OF LEFT WRIST: ICD-10-CM

## 2022-07-15 DIAGNOSIS — M25.60 STIFFNESS IN JOINT: Primary | ICD-10-CM

## 2022-07-15 PROCEDURE — 97530 THERAPEUTIC ACTIVITIES: CPT | Mod: PN

## 2022-07-15 NOTE — PROGRESS NOTES
Occupational Therapy Daily Treatment Note     Name: Melani Sloan  Clinic Number: [id number]    Therapy Diagnosis:   Encounter Diagnoses   Name Primary?    Stiffness in joint Yes    Arthralgia of left wrist      Physician: Satish Clark MD    Visit Date: 7/15/2022  Physician Orders: OT eval and treat  Medical Diagnosis: M19.041 (ICD-10-CM) - Arthritis of right hand      Evaluation Date: 6/30/2022  Plan of Care Certification Date: 8/12/2022  Authorization Period: 12/31/2022  Surgery Date and Procedure: none  Date of Return to MD: unknown    Visit # / Visits authorized: 1 / 12 total 2.  Time In:0830  Time Out: 0915  Total Billable Time: 45 minutes  Charges: 3 TA  Todays Amount: 3    Precautions:  Standard    Subjective     Pt reports: wrist hurts with movement. Feelings of stiffness persist and occasionally she gets triggering in her LF and RF with scissor work. She is already using ergonomic scissors.  she was compliant with home exercise program given last session.   Response to previous treatment:no adverse reactions.  Functional change: none yet (first visit)    Pain: 7/10  Location: bilateral hands      Objective   Observation:   Full ROM in B hands, no swelling present      Edema. Measured in centimeters.    4/29/21 4/29/21 5/4/21 5/421 6/30/2022 6/30/2022 7/15/22     R L R L R L right/left   Wrist Crease 14.6 15.2 14.8 14.8 15 14.8 14.5/14.8   Distal Palmar Crease 17.5 17 17.5 16.8 17.5 17.1 17.5/17.0       Strength (Dynamometer) and Pinch Strength (Pinch Gauge)  Measured in pounds.    4/29/21 4/29/21 5/4/21 5/4/21 6/30/2022 6/30/2022 7/15/22     R L L R R L right/left   Rung II 43 56 46 50.3 23# 31# 47/39   Scott Pinch 11 15 16 17 10 12 12/10   3pt Pinch 14 13 15 15 10 9 10/12             10 10       Manual Muscle Testing:     MMS   6/30/2022  Right MMS   6/30/2022  Left   Wrist flexion  4-/5 4/5   Wrist extension 4/5 4/5   Radial deviation  3/5  5/10 pain  3+/5   Ulnar deviation  3/5 3+/5   Digit  flexion 4+/5 4+/5   Digit extension  4+/5 4+/5      Fine Motor Coordination: 9 Hole Peg Test  Left 6/30/2022 Right   6/30/2022    :33 sec  :29 sec         Melani received the following supervised modalities after being cleared for contradictions for 10 minutes:   -paraffin wax for relieving pain and increasing extensibility of tissues.    Melani received the following manual therapy techniques for 8 minutes:   -reassessment of girth measurements.  Retrograde massage    Melani received therapeutic exercises for 11 minutes including:  -Joint blocks at DIP, PIP, and MCP of each hand    Orthotic and self care training for 18 minutes  Oval - 8 splints sized 7 for bilateral RFs.  Size 8 for right LF.  Good return demo of don/doff  Self care training for workplace modifications - scissors use, splinting, and positioning clients.  Home Exercises and Education Provided     Education provided:   - Progress towards goals     Written Home Exercises Provided: Patient instructed to cont prior HEP.  Exercises were reviewed and Melani was able to demonstrate them prior to the end of the session.  Melani demonstrated good  understanding of the HEP provided.   .   See EMR under Patient Instructions for exercises provided prior visit.        Assessment     Pt would continue to benefit from skilled OT. Good visit today characterized by training on workplace modifications, preventing triggering, and workplace strategies for reducing pain.     Melani is progressing well towards her goals and there are no updates to goals at this time. Pt prognosis is Excellent.     Pt will continue to benefit from skilled outpatient occupational therapy to address the deficits listed in the problem list on initial evaluation provide pt/family education and to maximize pt's level of independence in the home and community environment.     Anticipated barriers to occupational therapy: none    Pt's spiritual, cultural and educational needs considered  and pt agreeable to plan of care and goals.    Goals:  Short term Goals:  1) Initiate HEP  2) Pt will reduce pain to less than 4/10 in B hands  3) Pt will increase functional  strength by 5# in order to A in opening containers for med management or home management tasks  4) Pt will report compliance with orthotic use and will verbalize understanding of joint protection strategies to manage arthritis      Long Term Goals:  Goals to be met by discharge:  1) Independent with HEP  2) Pt will decrease pain to trace or none while completing light home management tasks or work related tasks by d/c.   3) Patient will be able to achieve less than or equal to 30% on the FOTO, demonstrating overall improved functional ability with upper extremity.  4) Pt will improve R/L  strength to 45# or better in order to better manage grooming tools   5) Pt will report improved use of B hands during work related tasks     Plan   Updates/Grading for next session: strengthen intrinsics      BEBE GONZALES, OT

## 2022-07-20 ENCOUNTER — CLINICAL SUPPORT (OUTPATIENT)
Dept: REHABILITATION | Facility: HOSPITAL | Age: 32
End: 2022-07-20
Payer: MEDICAID

## 2022-07-20 DIAGNOSIS — M62.81 MUSCLE WEAKNESS: ICD-10-CM

## 2022-07-20 DIAGNOSIS — M25.532 ARTHRALGIA OF LEFT WRIST: Primary | ICD-10-CM

## 2022-07-20 DIAGNOSIS — M25.60 STIFFNESS IN JOINT: ICD-10-CM

## 2022-07-20 PROCEDURE — 97530 THERAPEUTIC ACTIVITIES: CPT | Mod: PN

## 2022-07-20 NOTE — PROGRESS NOTES
Occupational Therapy Daily Treatment Note     Name: Melani Sloan  Clinic Number: [id number]    Therapy Diagnosis:   Encounter Diagnoses   Name Primary?    Arthralgia of left wrist Yes    Muscle weakness     Stiffness in joint      Physician: Satish Clark MD    Visit Date: 7/20/2022  Physician Orders: OT eval and treat  Medical Diagnosis: M19.041 (ICD-10-CM) - Arthritis of right hand      Evaluation Date: 6/30/2022  Plan of Care Certification Date: 8/12/2022  Authorization Period: 12/31/2022  Surgery Date and Procedure: none  Date of Return to MD: unknown    Visit # / Visits authorized: 2 / 12 total 3.  Time In:1045  Time Out: 1130  Total Billable Time: 45 minutes  Charges: 3 TA  Todays Amount: 3    Precautions:  Standard    Subjective     Pt reports: wrist hurts with flexion. She brought in old CTS splints.  She tried to use the oval-8 splints along with ergonomic scissors which was uncomfortable.  Feelings of stiffness throughout both hands persist.  she was compliant with home exercise program given last session.   Response to previous treatment:no adverse reactions.  Functional change: none yet (first visit)    Pain: 6/10  Location: left hand      Objective   Observation:   Full ROM in B hands, no swelling present      Edema. Measured in centimeters.    4/29/21 4/29/21 5/4/21 5/421 6/30/2022 6/30/2022 7/15/22     R L R L R L right/left   Wrist Crease 14.6 15.2 14.8 14.8 15 14.8 14.5/14.8   Distal Palmar Crease 17.5 17 17.5 16.8 17.5 17.1 17.5/17.0       Strength (Dynamometer) and Pinch Strength (Pinch Gauge)  Measured in pounds.    4/29/21 4/29/21 5/4/21 5/4/21 6/30/2022 6/30/2022 7/15/22     R L L R R L right/left   Rung II 43 56 46 50.3 23# 31# 47/39   Scott Pinch 11 15 16 17 10 12 12/10   3pt Pinch 14 13 15 15 10 9 10/12             10 10       Manual Muscle Testing:     MMS   6/30/2022  Right MMS   6/30/2022  Left   Wrist flexion  4-/5 4/5   Wrist extension 4/5 4/5   Radial deviation  3/5  5/10  pain  3+/5   Ulnar deviation  3/5 3+/5   Digit flexion 4+/5 4+/5   Digit extension  4+/5 4+/5      Fine Motor Coordination: 9 Hole Peg Test  Left 6/30/2022 Right   6/30/2022    :33 sec  :29 sec         Melani received the following supervised modalities after being cleared for contradictions for 10 minutes:   -paraffin wax for relieving pain and increasing extensibility of tissues.    Melani received the following manual therapy techniques for 15 minutes:   Manual mobilization of wrist and hand.   Taping of bilateral wrists for carpal tunnel symptoms.  Retrograde massage  defer-reassessment of girth measurements.    Melani received therapeutic exercises for 20 minutes including:  - red theraband -    Wrist isometrics with open hands in bilateral shoulder external rotation, modified elbow flexion/extension for radial and ulnar deviation, combined wrist and biceps flexion.  -Joint blocks at DIP, PIP, and MCP of each hand    Defer   Orthotic and self care training for 0 minutes  Oval - 8 splints sized 7 for bilateral RFs.  Size 8 for right LF.  Good return demo of don/doff  Self care training for workplace modifications - scissors use, splinting, and positioning clients.  Home Exercises and Education Provided     Education provided:   - Progress towards goals     Written Home Exercises Provided: Patient instructed to cont prior HEP.  Exercises were reviewed and Melani was able to demonstrate them prior to the end of the session.  Melani demonstrated good  understanding of the HEP provided.   .   See EMR under Patient Instructions for exercises provided prior visit.        Assessment     Pt would continue to benefit from skilled OT. Good visit today characterized by training on workplace modifications, preventing triggering, and workplace strategies for reducing pain.     Melani is progressing well towards her goals and there are no updates to goals at this time. Pt prognosis is Excellent.     Pt will continue to  benefit from skilled outpatient occupational therapy to address the deficits listed in the problem list on initial evaluation provide pt/family education and to maximize pt's level of independence in the home and community environment.     Anticipated barriers to occupational therapy: none    Pt's spiritual, cultural and educational needs considered and pt agreeable to plan of care and goals.    Goals:  Short term Goals:  1) Initiate HEP  2) Pt will reduce pain to less than 4/10 in B hands  3) Pt will increase functional  strength by 5# in order to A in opening containers for med management or home management tasks  4) Pt will report compliance with orthotic use and will verbalize understanding of joint protection strategies to manage arthritis      Long Term Goals:  Goals to be met by discharge:  1) Independent with HEP  2) Pt will decrease pain to trace or none while completing light home management tasks or work related tasks by d/c.   3) Patient will be able to achieve less than or equal to 30% on the FOTO, demonstrating overall improved functional ability with upper extremity.  4) Pt will improve R/L  strength to 45# or better in order to better manage grooming tools   5) Pt will report improved use of B hands during work related tasks     Plan   Updates/Grading for next session: strengthen intrinsics, continue wrist isometrics, provide additional training on work modifications, and determine most appropriate splinting for work and sleep.      BEBE GONZALES, OT

## 2022-07-27 ENCOUNTER — CLINICAL SUPPORT (OUTPATIENT)
Dept: REHABILITATION | Facility: HOSPITAL | Age: 32
End: 2022-07-27
Payer: MEDICAID

## 2022-07-27 DIAGNOSIS — M25.632 DECREASED RANGE OF MOTION OF BOTH WRISTS: ICD-10-CM

## 2022-07-27 DIAGNOSIS — M25.532 PAIN IN LEFT WRIST: ICD-10-CM

## 2022-07-27 DIAGNOSIS — M25.631 DECREASED RANGE OF MOTION OF BOTH WRISTS: ICD-10-CM

## 2022-07-27 PROCEDURE — 97530 THERAPEUTIC ACTIVITIES: CPT | Mod: PN

## 2022-07-27 NOTE — PROGRESS NOTES
"  Occupational Therapy Daily Treatment Note     Name: Melani Sloan  Clinic Number: [id number]    Therapy Diagnosis:   Encounter Diagnoses   Name Primary?    Decreased range of motion of both wrists     Pain in left wrist      Physician: Satish Clark MD    Visit Date: 7/27/2022  Physician Orders: OT eval and treat  Medical Diagnosis: M19.041 (ICD-10-CM) - Arthritis of right hand      Evaluation Date: 6/30/2022  Plan of Care Certification Date: 8/12/2022  Authorization Period: 12/31/2022  Surgery Date and Procedure: none  Date of Return to MD:     Visit # / Visits authorized: 3 / 12 total 4.  Time In: 5:45 pm  Time Out: 6:40 pm  Total Billable Time: 55 minutes    Precautions:  Standard    Subjective     Pt reports: 7/27: Pt reports pain "only on my left wrist". 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 She elaborated by explaining the braces cause movement limitations while grooming the dogs. She explains that pain increases when it is necessary to hold a dog up standing for sustained periods of time without wrist brace worn.   she was compliant with home exercise program given last session.   Response to previous treatment: pt hasn't reported benefit from therapy yet; she believes she is suppose to be strengthening as was done post-CTR in order to relieve her pain. This is her 4th visit.  Functional change: none yet; pt has not yet been receptive to activity modifications    Pain: 6/10 at worst; during work activities  Location: left hand      Objective   Observation:   Full ROM in B hands, no swelling present. Forward posture.    Sensation: Median Nerve Distribution   7/27/2022 7/27/2022    Right Left   Monofilament -Light Touch     Normal 1.65-2.83 Each digit and thumb tip respond timely Each digit and thumb tip respond timely   Diminished Light Touch 3.22-3.61     Diminished Protective 3.84-4.31     Loss of " "Protective 4.56-6.65     Untestable >6.65     2-Point Discrimination-Static Each digit & thumb digit discern 5 mm Thumb - 6mm  All other digit tips discern 5mm     Edema. Measured in centimeters.    4/29/21 4/29/21 5/4/21 5/421 6/30/2022 6/30/2022 7/15/22 7/27/22     R L R L R L right/left R/L   Wrist Crease 14.6 15.2 14.8 14.8 15 14.8 14.5/14.8 15.3/15.6   Distal Palmar Crease 17.5 17 17.5 16.8 17.5 17.1 17.5/17.0 18.3/18.2       Strength (Dynamometer) and Pinch Strength (Pinch Gauge)  Measured in pounds.    4/29/21 4/29/21 5/4/21 5/4/21 6/30/2022 6/30/2022 7/15/22 7/27/22     R L L R R L right/left R/L   Rung II 43 56 46 50.3 23# 31# 47/39 38/40   Scott Pinch 11 15 16 17 10 12 12/10 11/10   3pt Pinch 14 13 15 15 10 9 10/12 9/10             10 10  5.5/8.0      Wrist AROM   Right/Left   DATE 7/27/22   EXT 54/44   FLX 60/64   RD 22/30   UD  44/44   CASTILLO 180/182     Fine Motor Coordination: 9 Hole Peg Test  Left 6/30/2022 Right   6/30/2022    :33 sec  :29 sec         Melani received the following supervised modalities after being cleared for contradictions for 0 minutes:   -defer-paraffin wax for relieving pain and increasing extensibility of tissues.    Melani received the following manual therapy techniques for 8 minutes:   - Rigid Taping of bilateral wrists for support during work tasks; precautions discussed  - R/A girth; fit and issued size X-small Left 3/4 finger compression glove for edema mgmt    Melani received therapeutic exercises for 20 minutes including:  Postural strengthening with Wrist Isometrics Red T-band, 2 min each:  - Wrist extension with Scap retraction and shoulder ER   Flexibility 1x30" Extrinsic flexor stretch w/pray hands reviewed for HEP       Special Tests 7/27: Assessment of source of popping in the R RF. Assessed each EDC/sagittal band integrity. Assessed flexor tendon integrity and extrinsic flexor length, bilaterally   Objective measures 7/27: Assessed baseline wrist AROM  7/27: " "Assessed baseline light touch and 2-pt discrimination   Orthotics 7/27: Oval 8 (size 7) discontinued. Pt encouraged to f/u with Dr. Clark with concerns of R Sagittal band attrition. Otherwise, recommend yoke splint be fabricated for this   HEP As initially set     Melani received Self Care instructions and Orthosis Management for 30 minutes and participated in a concurrent discussion of re/evaluation findings and specific home care, including:  - education related to the tissue involvement as evidenced by MD diagnosis, positive response during provative movements and/or special testing.  - the anatomy and pathophysiology of the tissue involvement.  - instruction in activity modifications, joint protection and Energy conservation strategies for goals of protecting healing tissues and/or reduce episodes of provocative activities/postures to include:  - use of positioning or assistive devices for frequently handled self care and work items such as obtaining a larger sling for the large dogs  - Arthritis management strategies discussed as they relate to pt unique ADL and work demands   - Strongly encouraged wearing wrist supports during work tasks to reduce wrist strain       Home Exercises and Education Provided     Education provided:   - Progress towards goals   - See Self care above    Written Home Exercises Provided: Patient instructed to cont prior HEP.  Exercises were reviewed and Melani was able to demonstrate them prior to the end of the session.  Melani demonstrated good  understanding of the HEP provided.   .   See EMR under Patient Instructions for exercises provided prior visit.        Assessment   7/28: Clear subluxation of the Right RF EDC when fully flexing. This is the source of the "pop" as the tendon re-centralized during extension. She did not c/o pain with this. Her  and pinch have fluctuated over the months yet she appeared to provide sub-maximum effort today. Pt may have been reluctant to " activity modifications for managing over-use type symptoms, not understanding their benefit. She is challenged by the restricted movement that braces have caused yet appeared to be receptive to another trial, especially for the left wrist which is aggravated by sustained palm-up lifting of the dogs while being groomed. The pain does not appear to be primarily nerve related. There is suspect multiple tissue irritation and possible ligament attrition in the left wrist. OT deferred special test for this to her hand specialist for advice.    Melani is progressing well towards her goals and there are no updates to goals at this time. Pt prognosis is Excellent.     Pt will continue to benefit from skilled outpatient occupational therapy to address the deficits listed in the problem list on initial evaluation provide pt/family education and to maximize pt's level of independence in the home and community environment.     Anticipated barriers to occupational therapy: none    Pt's spiritual, cultural and educational needs considered and pt agreeable to plan of care and goals.    Goals:  Short term Goals:  1) Initiate HEP  2) Pt will reduce pain to less than 4/10 in B hands. MET for Right hand only  3) Pt will increase functional  strength by 5# in order to A in opening containers for med management or home management tasks  4) Pt will report compliance with orthotic use and will verbalize understanding of joint protection strategies to manage arthritis      Long Term Goals:  Goals to be met by discharge:  1) Independent with HEP  2) Pt will decrease pain to trace or none while completing light home management tasks or work related tasks by d/c.   3) Patient will be able to achieve less than or equal to 30% on the FOTO, demonstrating overall improved functional ability with upper extremity.  4) Pt will improve R/L  strength to 45# or better in order to better manage grooming tools   5) Pt will report improved use of  B hands during work related tasks     Plan   7/27: Perform Special tests if patient has not yet returned to hand specialist. Determine need for R RF yoke splint. Continue upper quadrant and hand/wrist strengthening with emphasis on balancing the wrist forces as well as improving the contribution of her postural muscles for reducing potential tissue strain distally. Provide additional training on work modifications, and determine most appropriate splinting for work and sleep. Request x-ray results.    PAPITO Lyon, KIMBERLYT  Occupational Therapist, Certified Hand Therapist

## 2022-07-28 PROBLEM — M25.631 DECREASED RANGE OF MOTION OF BOTH WRISTS: Status: ACTIVE | Noted: 2022-07-27

## 2022-07-28 PROBLEM — M25.532 PAIN IN LEFT WRIST: Status: ACTIVE | Noted: 2022-07-27

## 2022-07-28 PROBLEM — M25.632 DECREASED RANGE OF MOTION OF BOTH WRISTS: Status: ACTIVE | Noted: 2022-07-27

## 2022-08-03 ENCOUNTER — CLINICAL SUPPORT (OUTPATIENT)
Dept: REHABILITATION | Facility: HOSPITAL | Age: 32
End: 2022-08-03
Payer: MEDICAID

## 2022-08-03 DIAGNOSIS — M25.532 PAIN IN LEFT WRIST: ICD-10-CM

## 2022-08-03 DIAGNOSIS — M25.632 DECREASED RANGE OF MOTION OF BOTH WRISTS: Primary | ICD-10-CM

## 2022-08-03 DIAGNOSIS — M25.631 DECREASED RANGE OF MOTION OF BOTH WRISTS: Primary | ICD-10-CM

## 2022-08-03 PROCEDURE — 97530 THERAPEUTIC ACTIVITIES: CPT | Mod: PN

## 2022-08-03 NOTE — PATIENT INSTRUCTIONS
"  ARTHRITIS MANAGEMENT AND JOINT PROTECTION STRATEGIES      GENERAL CONCEPTS    1) TAKE REST BREAKS DURING DAILY ACTIVITIES THAT HAVE CAUSED HAND PAIN. BALANCE REST AND ACTIVITY. PERFORM THE EXERCISES PRESCRIBED BY YOUR HAND THERAPIST WITHIN PAIN-FREE RANGES AND INTENSITY.    2) DON'T PUSH BEYOND PAIN. PUSHING THROUGH OR BEYOND PAIN CAN CAUSE INCREASED JOINT DESTRUCTION. RESPECT PAIN. IT IS A SIGN OF INJURY.     3) USE LARGER JOINTS (Wrist, Elbow) WHEN ABLE. "Scoop & Carry" instead of "Grab and Go". REDUCE THE EFFORT AND FORCE THROUGH MODIFYING ACTIVITIES.    4) Avoid sustained gripping. Alternate hands when needing to maintain a firm gripping action such as driving long distances.    5) Use electric equipment and assistive devices, such as dycem, a can opener, , and . This applies to tools also.    6) Use items that can reduce carrying heavy items such as sliding trivets or rolling carts    7) Delegate to others when available.     8) Avoid positions of deformity. Wear the splints/braces recommended by your doctor and/or therapist.    See Examples below.      Joint Protection (Wringing)    Avoid wringing towels by twisting.  Solution: Loop towel around sink faucet as if braiding and pull gently, or let drip-dry.    Joint Protection (Use Large Joints)    Avoid placing pressure on fingertips.  Solution: Transfer work to other parts of body which are not affected or which have greater strength. Using body weight to push heavy doors open is an example.    Joint Protection (Ulnar Deviation)    Avoid positions that cause fingers to lean sideways toward little finger.  Solution: Use devices like jar-openers to assist in activities.    Joint Protection (Pinch)    Avoid tight pinch, such as when holding a pen.  Solution: Use a thick pen with a felt tip to reduce pressure on fingers.    Joint Protection (Lifting)    Avoid picking up heavy items with one hand.  Solution: Use both hands, and slide item " whenever possible.     Joint Protection ()    Avoid: grasping thin utensils for prolonged periods.  Solution: Hold thick-handled tools in dagger fashion when-ever possible for performing tasks such as stirring or scrub-tiffany. Relax fingers every 10 minutes during activity.    Joint Protection (Carrying)    Avoid carrying items with weight on fingers.  Solution: Use a shoulder bag or a back pack.    [facility] reference:  https://www.[email or web address]/Doc/pl%20-%44524.1%20-%20joint%20protection%20techniques%20for%20hand/finger%20arthritis.pdf    Copyright © [employer]. All rights reserved.          [facility], OCCUPATIONAL THERAPY  HOME EXERCISE PROGRAM       PERFORM STRENGTHENING EXERCISES EVERY OTHER DAY; AND ALWAYS PAIN-FREE          [facility], OCCUPATIONAL THERAP[facility]  HOME EXERCISE PROGRAM     PERFORM STRENGTHENING EXERCISES EVERY OTHER DAY AND ALWAYS PAIN-FREE                You can do these using the log also:

## 2022-08-03 NOTE — PROGRESS NOTES
"  Occupational Therapy Daily Treatment Note     Name: Melani Sloan  Clinic Number: [id number]    Therapy Diagnosis:   Encounter Diagnoses   Name Primary?    Decreased range of motion of both wrists Yes    Pain in left wrist      Physician: Satish Clark MD & Dr. Terry Sands    Visit Date: 8/3/2022  Physician Orders: New Order rec'd & dated 8/3/22 w/addition of M19.042: 2x/week x 6 weeks; Rt PIP 3, 4 Synovitis, Do edema control, modalities, Lt CTS desensitization incision, Use Paraffin  Medical Diagnosis: M19.041 (ICD-10-CM) - Arthritis of right hand; M19.042 (Left)     Evaluation Date: 6/30/2022  Plan of Care Certification Date: 8/12/2022  Authorization Period: 12/31/2022  Surgery Date and Procedure: none  Date of Return to MD:     Visit # / Visits authorized: 4 / 12 total 5.  Time In: 5:45 pm  Time Out: 6:35 pm  Total Billable Time: 50 minutes    Precautions:  Standard    Subjective     Pt reports: 8/3: Pt arrives after follow up with Dr. Sands, an associate with [facility]. She explained that Dr. Sands found no issues with her R RF MCP. OT also found no subluxation today; no EDC snap as observed last session. She discussed having a positive experience with Dr. Waddell after her CTR. She knows that Dr. Waddell is now with [facility]. She explains that her wrist orthosis post-CTR are now wearing down; the velcro no longer sticks. She will bring these to OT for modification/repair.   she was compliant with home exercise program given last session.   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 She states "yall have to fix me". She was receptive to continuing the activity modifications as they are appropriate to her work tasks. She believes she is suppose to be strengthening as was done post-CTR in order to relieve her pain. This is her 5th visit.  Functional change: 8/3/22: QuickDASH (only 9 answers): Work Module = " "50% (only 2 answers)  Pt reports no longer lifting the big dogs but has the "difficult" dogs which cause pain even when wearing her left wrist brace. 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    Pain: 6/10 at worst; during work activities  Location: left hand      Objective   Observation:   Full ROM in B hands, no swelling present. Forward posture.    Sensation: Median Nerve Distribution   7/27/2022 7/27/2022    Right Left   Monofilament -Light Touch     Normal 1.65-2.83 Each digit and thumb tip respond timely Each digit and thumb tip respond timely   Diminished Light Touch 3.22-3.61     Diminished Protective 3.84-4.31     Loss of Protective 4.56-6.65     Untestable >6.65     2-Point Discrimination-Static Each digit & thumb digit discern 5 mm Thumb - 6mm  All other digit tips discern 5mm     Edema. Measured in centimeters.    4/29/21 4/29/21 5/4/21 5/421 6/30/2022 6/30/2022 7/15/22 7/27/22     R L R L R L right/left R/L   Wrist Crease 14.6 15.2 14.8 14.8 15 14.8 14.5/14.8 15.3/15.6   Distal Palmar Crease 17.5 17 17.5 16.8 17.5 17.1 17.5/17.0 18.3/18.2       Strength (Dynamometer) and Pinch Strength (Pinch Gauge)  Measured in pounds.    4/29/21 4/29/21 5/4/21 5/4/21 6/30/2022 6/30/2022 7/15/22 7/27/22     R L L R R L right/left R/L   Rung II 43 56 46 50.3 23# 31# 47/39 38/40   Scott Pinch 11 15 16 17 10 12 12/10 11/10   3pt Pinch 14 13 15 15 10 9 10/12 9/10             10 10  5.5/8.0      Wrist AROM   Right/Left   DATE 7/27/22   EXT 54/44   FLX 60/64   RD 22/30   UD  44/44   CASTILLO 180/182     Fine Motor Coordination: 9 Hole Peg Test  Left 6/30/2022 Right   6/30/2022    :33 sec  :29 sec         Melani received the following supervised modalities after being cleared for contradictions for 8 minutes:   - Both hands: Paraffin wax heat pre-tx for promoting healing, decreasing pain and increasing tissue extensibility    Melani received the following direct contact modalities " "after being cleared for contraindications for 8 minutes:  - U/S @ 0.8 w/cm2 using 1cm soundhead @ 3.3Mhz; volar palm incision scar; Left    Melani received the following manual therapy techniques for 8 minutes:   - Passive extrinsic flexor stretch and massage; Left  - Elastic tape appliced with space correction for Right & Left LF/RF volar and dorsal hand proximal to wrist  - defer-R/A girth; fit and issued size X-small Left 3/4 finger compression glove for edema mgmt    Melani received therapeutic exercises for 20 minutes including:  Flexibility 1x30" Extrinsic flexor stretch w/pray hands reviewed for HEP   Postural strengthening with Wrist Isometrics Red T-band, 2 min each:  - Bilateral Scap retraction and shoulder ER; loop placed above right wrist due to pain. Left wrist extensors isometric strengthening with this today   Hand strengthening Green t-putty Strengthening, 5-10 reps each:  - PADs/DABs; lifts w/adduction log squeeze/pumps (scissors)  - Extrinsic/intrinsic extensors; donut stretches with thumb  - extension rolls w/graded palm pressure     Special Tests 8/3: No Right RF EDC subluxation or snap today.   Objective measures 7/27: Assessed baseline wrist AROM  7/27: Assessed baseline light touch and 2-pt discrimination   Orthotics 8/3: OT encouraged patient to bring her custom orthosis for check out and repair   HEP as upgraded 8/3: Green t-putty for intrinsic and extrinsic extension; wrist and digits     Melani received Self Care instructions and Orthosis Management for 5 minutes and participated in a concurrent discussion of re/evaluation findings and specific home care, including:  - education related to the tissue involvement as evidenced by MD diagnosis, positive response during provative movements and/or special testing.  - instruction in activity modifications, joint protection and Energy conservation strategies for goals of protecting healing tissues and/or reduce episodes of provocative " activities/postures to include:  - use of positioning or assistive devices for frequently handled self care and work items such as obtaining a larger sling for the large dogs  - Arthritis management strategies discussed as they relate to pt unique ADL and work demands; Hand out provided  - Strongly encouraged wearing wrist supports during work tasks to reduce wrist strain       Home Exercises and Education Provided     Education provided:   - HEP as upgraded  - Progress towards goals   - See Self care above    Written Home Exercises Provided: yes.  Exercises were reviewed and Melani was able to demonstrate them prior to the end of the session.  Melani demonstrated good  understanding of the HEP provided.     See EMR under Patient Instructions for exercises provided prior visit. 8/3/22       Assessment   8/3: MD orders acknowledged. No subluxation of the Right RF EDC when fully flexing today. MD has dx her Right RF/LF with Synovitis as OT Tom had originally suspected. She demonstrated no snap or pop with the Right digits today. Pt appears receptive now to activity modifications and has improved understanding of the contribution from her OT treatment as well as her expected commitment at home for reducing episodes of pain and strain. Tx program and HEP were adjusted based on new orders and current symptoms.    Melani is progressing well towards her goals and there are no updates to goals at this time. Pt prognosis is Good.     Pt will continue to benefit from skilled outpatient occupational therapy to address the deficits listed in the problem list on initial evaluation provide pt/family education and to maximize pt's level of independence in the home and community environment.     Anticipated barriers to occupational therapy: none    Pt's spiritual, cultural and educational needs considered and pt agreeable to plan of care and goals.    Goals:  Short term Goals:  1) Initiate HEP  2) Pt will reduce pain to less  than 4/10 in B hands. MET for Right hand only  3) Pt will increase functional  strength by 5# in order to A in opening containers for med management or home management tasks  4) Pt will report compliance with orthotic use and will verbalize understanding of joint protection strategies to manage arthritis. Progressing     Long Term Goals:  Goals to be met by discharge:  1) Independent with HEP. Progressing  2) Pt will decrease pain to trace or none while completing light home management tasks or work related tasks by d/c.   3) Patient will be able to achieve less than or equal to 30% on the FOTO, demonstrating overall improved functional ability with upper extremity.  4) Pt will improve R/L  strength to 45# or better in order to better manage grooming tools   5) Pt will report improved use of B hands during work related tasks     Plan   8/3: Continue as per new MD order. Determine if goals are appropriate for having only been seen by OT for 5 sessions. Modify accordingly. Continue upper quadrant and hand/wrist strengthening with emphasis on balancing the wrist forces as well as improving the contribution of her postural muscles for reducing potential tissue strain distally. Provide additional training on work modifications, and determine most appropriate splinting for work and sleep.    PAPITO Lyon, CHT  Occupational Therapist, Certified Hand Therapist

## 2022-08-04 ENCOUNTER — TELEPHONE (OUTPATIENT)
Dept: ORTHOPEDICS | Facility: CLINIC | Age: 32
End: 2022-08-04
Payer: MEDICAID

## 2022-08-04 NOTE — TELEPHONE ENCOUNTER
Left voice mail for pt to return my call   Pt can come to clinic to fill out a KJ to get medical records for [facility] sent to Dr Waddell

## 2022-08-04 NOTE — TELEPHONE ENCOUNTER
I attempt to reach out to pt to to schedule apt about the message that the pt's  Father left, but I was unable to leave a voice mail phone just kept ringing.

## 2022-08-04 NOTE — TELEPHONE ENCOUNTER
----- Message from Agustin Miller sent at 8/4/2022  8:30 AM CDT -----  Type:  Patient Returning Call    Who Called:Pt   Would the patient rather a call back or a response via CTS Medianer? Call back   Best Call Back Number: [phone]  Additional Information:     Re est pt at diff location  Pt would like to re est care due to stiff hand / pain

## 2022-08-04 NOTE — TELEPHONE ENCOUNTER
Spoke with pt.  Pt wants to transfer her care from Dr Waddell's previous clinic to here.  Pt has a referral from Dr Satish Clark in [facility].   Pt will go to [facility] today and sign a KJ and have records sent to my attention at [phone]   Advised pt that Dr Waddell is currently out of town.  I will have Dr Waddell review records once received and advise me where to add pt to her schedule as she doesn't currently have any openings in the next week.  Pt wants to be seen ASAP due to ongoing pain / swelling.  Pt had previous surgeries with Dr Waddell.    Pending receipt of pt's medical records.

## 2022-08-11 ENCOUNTER — PATIENT OUTREACH (OUTPATIENT)
Dept: ADMINISTRATIVE | Facility: HOSPITAL | Age: 32
End: 2022-08-11
Payer: MEDICAID

## 2022-08-19 ENCOUNTER — CLINICAL SUPPORT (OUTPATIENT)
Dept: REHABILITATION | Facility: HOSPITAL | Age: 32
End: 2022-08-19
Payer: MEDICAID

## 2022-08-19 DIAGNOSIS — M25.632 DECREASED RANGE OF MOTION OF BOTH WRISTS: Primary | ICD-10-CM

## 2022-08-19 DIAGNOSIS — M25.631 DECREASED RANGE OF MOTION OF BOTH WRISTS: Primary | ICD-10-CM

## 2022-08-19 DIAGNOSIS — M25.532 PAIN IN LEFT WRIST: ICD-10-CM

## 2022-08-19 PROCEDURE — 97530 THERAPEUTIC ACTIVITIES: CPT | Mod: PN

## 2022-08-19 NOTE — PATIENT INSTRUCTIONS
[facility], OCCUPATIONAL THERAP[facility]  HOME EXERCISE PROGRAM     PERFORM STRENGTHENING EXERCISES EVERY OTHER DAY AND ALWAYS PAIN-FREE                              You can do these using the log also:

## 2022-08-19 NOTE — PROGRESS NOTES
"  Occupational Therapy Discharge Summary & Daily Treatment Note     Name: Melani Sloan  Clinic Number: [id number]    Therapy Diagnosis:   Encounter Diagnoses   Name Primary?    Decreased range of motion of both wrists Yes    Pain in left wrist      Physician: Satish Clark MD & Dr. Terry Sands    Visit Date: 8/19/2022  Physician Orders: New Order rec'd & dated 8/3/22 w/addition of M19.042: 2x/week x 6 weeks; Rt PIP 3, 4 Synovitis, Do edema control, modalities, Lt CTS desensitization incision, Use Paraffin  Medical Diagnosis: M19.041 (ICD-10-CM) - Arthritis of right hand; M19.042 (Left)     Evaluation Date: 6/30/2022  Plan of Care Certification Date: 8/12/2022  Authorization Period: 12/31/2022  Surgery Date and Procedure: none  Date of Return to MD: 8/18/22 (rescheduled to 8/26/22)    Visit # / Visits authorized: 5 / 12 total 6.  Time In: 3:15 pm  Time Out:  4:15 pm  Total Billable Time: 45 minutes    Precautions:  Standard    Subjective     Pt reports: 8/19: Pt returns after 2 week absence explaining that she had a week away on vacation from work and found that her pain in her hand/wrist resolved during that week of rest. She presents without splints or HEP media. She expresses dissatisfaction with therapy frequency and explains desire to "strengthen" her hands. She reports having increased pain with the t-putty strengthening issued last session for HEP. She spoke with Alex, Supervisor, to confirm plan going forward which is to follow up with her MD for further orders and advice before returning to OT.   8/3: Pt arrives after follow up with Dr. Sands, an associate with [facility]. She explained that Dr. Sands found no issues with her R RF MCP. OT also found no subluxation today; no EDC snap as observed last session. She discussed having a positive experience with Dr. Waddell after her CTR. She knows that Dr. Waddell is now with [facility]. She explains that her wrist orthosis post-CTR are now wearing down; " "the velcro no longer sticks. She will bring these to OT for modification/repair.   she was compliant with home exercise program given last session.   Response to previous treatment: Pt reports decreased pain with the elastic tape applied. Pain with use of the green t-putty. She believes she is suppose to be strengthening as was done post-CTR in order to relieve her pain. This is her 6th visit.  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 She states "yall have to fix me". She was receptive to continuing the activity modifications as they are appropriate to her work tasks    Pain: 6/10 at worst; during work activities  Location: left wrist today      Objective   Observation:   Full ROM in B hands, no swelling present. Forward posture.    Sensation: Median Nerve Distribution   7/27/2022 7/27/2022    Right Left   Monofilament -Light Touch     Normal 1.65-2.83 Each digit and thumb tip respond timely Each digit and thumb tip respond timely   Diminished Light Touch 3.22-3.61     Diminished Protective 3.84-4.31     Loss of Protective 4.56-6.65     Untestable >6.65     2-Point Discrimination-Static Each digit & thumb digit discern 5 mm Thumb - 6mm  All other digit tips discern 5mm     Edema. Measured in centimeters.    4/29/21 4/29/21 5/4/21 5/421 6/30/2022 6/30/2022 7/15/22 7/27/22 8/19/22     R L R L R L right/left R/L R/L   Wrist Crease 14.6 15.2 14.8 14.8 15 14.8 14.5/14.8 15.3/15.6 15.5/15.5   Distal Palmar Crease 17.5 17 17.5 16.8 17.5 17.1 17.5/17.0 18.3/18.2 18.5/17.8       Strength (Dynamometer) and Pinch Strength (Pinch Gauge)  Measured in " "pounds.    4/29/21 4/29/21 5/4/21 5/4/21 6/30/2022 6/30/2022 7/15/22 7/27/22 8/19/22     R L L R R L right/left R/L R/L   Rung II 43 56 46 50.3 23# 31# 47/39 38/40 22/26   Scott Pinch 11 15 16 17 10 12 12/10 11/10 8/7.5   3pt Pinch 14 13 15 15 10 9 10/12 9/10 7/7.5             10 10  5.5/8.0 3.5/5      Wrist AROM   Right/Left R/L   DATE 7/27/22 8/19/22   EXT 54/44 54/51   FLX 60/64 =   RD 22/30 =   UD  44/44 =   CASTILLO 180/182 180/189 (+7)     Fine Motor Coordination: 9 Hole Peg Test  Left 6/30/2022 Right   6/30/2022    :33 sec  :29 sec      DEXTERITY Tests   Right/Left   DATE 8/19/22   Grooved Peg Timed Test 1'10"/1'23"          Treatment     Melani received the following supervised modalities after being cleared for contradictions for 15 minutes:   - defer-Both hands: Paraffin wax heat pre-tx for promoting healing, decreasing pain and increasing tissue extensibility  - Both hands: Fluidotherapy for promoting healing, reducing pain, desensitization and increasing tissue extensibility      Melani received therapeutic exercises for 20 minutes including:  Flexibility 1x30" Extrinsic flexor stretch w/pray hands reviewed for HEP   Postural strengthening with Wrist Isometrics- defer to HEP Red T-band, 2 min each:  - Bilateral Scap retraction and shoulder ER; loop placed above right wrist due to pain. Left wrist extensors isometric strengthening with this today   Hand strengthening Red t-putty Strengthening, 5-10 reps each:  - 10 sec sustained   - tip pinch  - 3-jaw pinch  - lateral pinch  - PADs/DABs w/adduction log squeeze/pumps (scissors)  - PADs/DABs w/donut stretches (spreads-no thumb)  - Extrinsic extensors w/donut stretches with thumb  - extension rolls w/graded palm pressure  - defer-pancake presses for weight bearing progression  Medium , 1x10 each  Red resistance pin, both hands, 1 container of small/med pompoms:  - lateral pinch  - 3-jaw pinch   Objective measures 8/19: R/A wrist AROM  8/19: Assess " baseline fine motor skills  7/27: Assessed baseline wrist AROM  7/27: Assessed baseline light touch and 2-pt discrimination   Orthotics 8/3: OT encouraged patient to bring her custom orthosis for check out and repair   HEP as upgraded 8/19: Red t-putty issued  8/3: Green t-putty for intrinsic and extrinsic extension; wrist and digits     Melani received Self Care instructions and Orthosis Management for 10 minutes and participated in a concurrent discussion of re/evaluation findings and specific home care, including:  - education related to the tissue involvement as evidenced by MD diagnosis, positive response during provative movements and/or special testing.  - instruction in activity modifications, joint protection and Energy conservation strategies for goals of protecting healing tissues and/or reduce episodes of provocative activities/postures to include:  - use of positioning or assistive devices for frequently handled self care and work items such as obtaining a larger sling for the large dogs  - Arthritis management strategies discussed as they relate to pt unique ADL and work demands; Hand out provided  - Strongly encouraged wearing wrist supports during work tasks to reduce wrist strain       Home Exercises and Education Provided     Education provided:   - HEP as downgraded  - Progress towards goals   - See Self care above    Written Home Exercises Provided: yes.  Exercises were reviewed and Melani was able to demonstrate them prior to the end of the session.  Melani demonstrated good  understanding of the HEP provided.     See EMR under Patient Instructions for exercises provided prior visit. 8/3/22, 8/19/22     Assessment   8/19: Pt has only attended 6 out of the 12 scheduled visits. She cancelled these due to being out of town or due to appt time no longer working for her.   She present today with improved left wrist extension AROM without pain.   Her  and pinch strength have declined  bilaterally although she demonstrated quick and unsustained, and uncertain submaximal efforts.  Pain in the Left wrist presents with  and pinch strengthening yet patient was adamant that strengthening was needed and persistent in her requests even after discussing special test and subjective report assessment as well as MD dx.    OT and OT Supervisor recommended return to MD for advice and re-examination of symptoms that are worsening with attempts to strengthen her left hand.   Pt appears to be misunderstanding the tx program for CTS versus arthritis. OT reviewed joint protection concepts and discussed the difference in the diagnoses in detail.     Melani is progressing well towards her goals and there are no updates to goals at this time. Pt prognosis is Good.     Pt will continue to benefit from skilled outpatient occupational therapy to address the deficits listed in the problem list on initial evaluation provide pt/family education and to maximize pt's level of independence in the home and community environment.     Anticipated barriers to occupational therapy: none    Pt's spiritual, cultural and educational needs considered and pt agreeable to plan of care and goals.    Goals:  Short term Goals:  1) Initiate HEP  2) Pt will reduce pain to less than 4/10 in B hands. MET for Right hand only  3) Pt will increase functional  strength by 5# in order to A in opening containers for med management or home management tasks. Not Met  4) Pt will report compliance with orthotic use and will verbalize understanding of joint protection strategies to manage arthritis. Not Met     Long Term Goals:  Goals to be met by discharge:  1) Independent with HEP. MET  2) Pt will decrease pain to trace or none while completing light home management tasks or work related tasks by d/c. Not Met  3) Patient will be able to achieve less than or equal to 30% on the FOTO, demonstrating overall improved functional ability with upper  extremity. Not Met  4) Pt will improve R/L  strength to 45# or better in order to better manage grooming tools. Not Met  5) Pt will report improved use of B hands during work related tasks. Not Met    Plan   8/19: Pt is recommended for discharge from OT until further advice and MD orders are received.    PAPITO Lyon, PETAR  Occupational Therapist, Certified Hand Therapist

## 2022-11-29 DIAGNOSIS — M65.841 OTHER SYNOVITIS AND TENOSYNOVITIS, RIGHT HAND: ICD-10-CM

## 2022-11-29 DIAGNOSIS — M65.841 STENOSING TENOSYNOVITIS OF FINGER OF RIGHT HAND: Primary | ICD-10-CM

## 2022-11-30 DIAGNOSIS — M65.841 STENOSING TENOSYNOVITIS OF FINGER OF RIGHT HAND: Primary | ICD-10-CM

## 2022-12-06 ENCOUNTER — CLINICAL SUPPORT (OUTPATIENT)
Dept: REHABILITATION | Facility: HOSPITAL | Age: 32
End: 2022-12-06
Payer: MEDICAID

## 2022-12-06 DIAGNOSIS — M79.641 RIGHT HAND PAIN: ICD-10-CM

## 2022-12-06 DIAGNOSIS — Z78.9 DIFFICULTY WITH ACTIVITIES OF DAILY LIVING: ICD-10-CM

## 2022-12-06 DIAGNOSIS — R29.898 RIGHT ARM WEAKNESS: ICD-10-CM

## 2022-12-06 PROCEDURE — 97166 OT EVAL MOD COMPLEX 45 MIN: CPT | Mod: PN

## 2022-12-07 ENCOUNTER — CLINICAL SUPPORT (OUTPATIENT)
Dept: REHABILITATION | Facility: HOSPITAL | Age: 32
End: 2022-12-07
Payer: MEDICAID

## 2022-12-07 DIAGNOSIS — Z78.9 DIFFICULTY WITH ACTIVITIES OF DAILY LIVING: ICD-10-CM

## 2022-12-07 DIAGNOSIS — M79.641 RIGHT HAND PAIN: Primary | ICD-10-CM

## 2022-12-07 DIAGNOSIS — R29.898 RIGHT ARM WEAKNESS: ICD-10-CM

## 2022-12-07 PROCEDURE — 97530 THERAPEUTIC ACTIVITIES: CPT | Mod: PN

## 2022-12-07 NOTE — PLAN OF CARE
"  [facility] Occupational Therapy  Initial Evaluation     Date: 12/6/2022  Patient: Melani Sloan  Chart Number: [id number]  Referring Physician: Satish Clark MD  Therapy Diagnosis:   1. Right hand pain        2. Right arm weakness        3. Difficulty with activities of daily living            Physician Orders: Eval and treat - "right hand range of motion, ultrasound, deep heat massage, paraffin"  Medical Diagnosis: M65.841 (ICD-10-CM) - Other synovitis and tenosynovitis, right hand  Evaluation Date: 12/6/2022  Plan of Care Certification Date: 1/31/23  Authorization Period: 12/5/22 - 12/31/22  Surgery Date and Procedure: N/A  Date of Return to MD: TBD    Visit #: 1 of 1  Time In: 1000  Time Out: 1045  Total Billable Time: 45 mins    Precautions: Standard    Subjective     History of Current Condition: She reports a right CTR in 2018 and a left CTR in 2019. She was previously treated here at [facility] ED for residual symptoms from her CTR.     She reports she feels "stiffness, pain, unusual symptoms" of dorsal right digits 2-4 at the P1 levels and pain at dorsal right wrist that radiates up dorsal palm.She reports pain at the lateral epicondyle, but negative for long finger extension test. She visited the MD 2 weeks ago and had an X-Ray which showed no fracture. She reports her painful/"unusual" symptoms have persisted for about 3 weeks and have recently worsened. 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 She reports the symptoms that she's experiencing now are new from the last time she was seen here for OT.     Involved Side: right   Dominant Side: right   Date of Onset: 3 weeks   Imaging: X-Ray   Previous Therapy: Yes - OT here     Patient's Goals for Therapy: Restore functional use of right upper extremity for improved performance at her job     Pain:  Functional Pain Scale Rating 0-10:   4/10 on average  0/10 at best  9/10 at " "worst  Location: right hand, dorsal primarily   Description: "stiffness"  Aggravating Factors: Increased activity  Easing Factors: rest    Occupation:  FT  - She has modified/slowed down her work secondary to pain   Working presently: employed  Duties: washing/grooming dogs     Functional Limitations/Social History:    Prior Level of Function: I  Current Level of Function:Mod I     Home/Living environment : family   DME: none      Leisure: family   Driving: Y    Past Medical History/Physical Systems Review:   Melani Sloan  has a past medical history of Seizures.    Melani Sloan  has a past surgical history that includes Vagus nerve stimulator insertion.    Melani has a current medication list which includes the following prescription(s): brivaracetam, brivaracetam, felbamate, hydrocodone-acetaminophen, naproxen, and zonisamide.    Review of patient's allergies indicates:   Allergen Reactions    Benadryl [diphenhydramine hcl]      Drug interactions          Objective     Mental status: alert, oriented x3      Sensation: volar fingertips   12/6/2022 12/6/2022    Left Right   Monofilament Testing     Normal 1.65-2.83 2.83 2.83   Diminished Light Touch 3.22-3.61     Diminished Protective 3.84-4.31     Loss of Protective 4.56-6.65     Untestable >6.65           Range of Motion:   Bilateral wrist active range of motion: within normal limits but with crepitis   Bilateral digits, wrist, forearm: wnl       Special Tests:   - long finger extension right: negative  - tinels of right guyons canal: negative  - palpation of arcade of froesh right arm: positive   - ulnar n tension test right arm- positive        Strength: (RACHEL Dynamometer in psi.)      12/6/2022 12/6/2022    Left Right   Rung II 44 35   Right hand dominant       Pinch Strength (Measured in psi)     12/6/2022 12/6/2022    Left Right   Key Pinch 12  12    3pt Pinch 10  9    2pt Pinch 10  9        CMS Impairment/Limitation/Restriction for FOTO " Hand/Wrist/Finger Survey    Therapist reviewed FOTO scores for Melani Sloan on 12/6/2022.   FOTO documents entered into [employer] - see Media section.    Limitation Score: 52.3%  Category: Self Care             Treatment     Home Exercise Program/Education:  *Formal home exercise program to be issued next visit     Patient/Family Education: role of OT, goals for OT, scheduling/cancellations - pt verbalized understanding. Discussed insurance limitations with patient.    Additional Education provided: use of hot and cold modalities       Assessment     Melani Sloan is a 32 y.o. female presents with right upper extremitiy pain, weakness, and difficulty with performing her activities of daily living (especially her hands on job as a ).     Patient can benefit from Occupational Therapy services for Iontophoresis, ultrasound, moist heat, therapeutic exercises, home exercise program provied with written instructions, ice and strengthening and orthotics, if deemed necessary . The following goals were discussed with the patient and she is in agreement with them as to be addressed in the treatment plan.    The patient's rehab potential is Fair.     Anticipated barriers to occupational therapy: none   Pt has no cultural, educational or language barriers to learning provided.    Profile and History Assessment of Occupational Performance Level of Clinical Decision Making Complexity Score   Occupational Profile:   Melani Sloan is a 32 y.o. female who is currently employed Melani Sloan has difficulty with  ADLs and IADLs as listed previously, which  affecting his/her daily functional abilities.      Comorbidities:    has a past medical history of Seizures.    Medical and Therapy History Review:   Expanded               Performance Deficits    Physical:  Muscle Power/Strength   Strength  Pinch Strength  Pain    Cognitive:  No Deficits    Psychosocial:    Habits  Routines  Rituals     Clinical Decision  Making:  moderate    Assessment Process:  Problem-Focused Assessments    Modification/Need for Assistance:  Not Necessary    Intervention Selection:  Multiple Treatment Options       moderate  Based on PMHX, co morbidities , data from assessments and functional level of assistance required with task and clinical presentation directly impacting function.         Goals:    LTG's (8 weeks):  1)   Increase  strength to 50 lbs. to grasp heavier dogs/heavier work equipment   2)   Decrease complaints of pain to  2 out of 10 at worst to increase functional hand use for ADL/work/leisure activities.  3)   Patient to score at 20% or less on FOTO to demonstrate improved perception of functional right upper extremity use.  4)   Pt will return to near to prior level of function for ADLs and household management reporting I or Mod I with ADLs (dressing, feeding, grooming, toileting).   5)   Pt will reports decreased pain/discomfort will palpation of radial tunnel   6)   Pt will report back to prior level of function at work/no longer needing to modify her work tasks     STG's (4 weeks)  1)   Patient to be IND with HEP and modalities for pain/edema managment.  2)   Increase  strength 45 lbs. to improve functional grasp for ADLs/work/leisure activities.   3)   Decrease complaints of pain to  5 out of 10 at worst to increase functional hand use for ADL/work/leisure activities.  4)   Pt will report Herrick with performing radial nerve glides         Plan     Pt to be treated by Occupational Therapy 2x times per week for 8 weeks during the certification period from 12/6/2022 to 1/31/23 to achieve the established goals.     Treatment to include: Paraffin, Fluidotherapy, Manual therapy/joint mobilizations, Modalities for pain management, US 3 mhz, Therapeutic exercises/activities., Iontophoresis with 2.0 cc Dexamethasone, Strengthening, Orthotic Fabrication/Fit/Training, Edema Control, Scar Management, Wound Care, Electrical  Modalities, Joint Protection, and Energy Conservation, as well as any other treatments deemed necessary based on the patient's needs or progress.       PAPITO Shearer

## 2022-12-07 NOTE — PROGRESS NOTES
"  Occupational Therapy Daily Treatment Note     Name: Melani Sloan  Clinic Number: [id number]    Therapy Diagnosis:   Encounter Diagnoses   Name Primary?    Right hand pain Yes    Right arm weakness     Difficulty with activities of daily living      Physician: Satish Clark MD    Visit Date: 12/7/2022    Physician Orders: Eval and treat - "right hand range of motion, ultrasound, deep heat massage, paraffin"  Medical Diagnosis: M65.841 (ICD-10-CM) - Other synovitis and tenosynovitis, right hand  Evaluation Date: 12/6/2022  Plan of Care Certification Date: 1/31/23  Authorization Period: 12/5/22 - 12/31/22  Surgery Date and Procedure: N/A  Date of Return to MD: TBD  Visit # / Visits authorized:  1 / 20    Time In:1445  Time Out: 1530  Total Billable Time: 45 minutes    Precautions:  Standard    Subjective     Pt reports: She reports painful/nerve/stiffness symptoms are persistent.   she was compliant with home exercise program given last session.   Response to previous treatment:2nd visit   Functional change: n/a    Pain: 3/10  Location: Right hand    Objective       Mental status: alert, oriented x3        Sensation: volar fingertips    12/6/2022 12/6/2022     Left Right   Monofilament Testing       Normal 1.65-2.83 2.83 2.83   Diminished Light Touch 3.22-3.61       Diminished Protective 3.84-4.31       Loss of Protective 4.56-6.65       Untestable >6.65                Range of Motion:   Bilateral wrist active range of motion: within normal limits but with crepitis   Bilateral digits, wrist, forearm: wnl         Special Tests:   - long finger extension right: negative  - tinels of right guyons canal: negative  - palpation of arcade of froesh right arm: positive   - ulnar n tension test right arm- positive          Strength: (RACHEL Dynamometer in psi.)        12/6/2022 12/6/2022     Left Right   Rung II 44 35   Right hand dominant         Pinch Strength (Measured in psi)       12/6/2022 12/6/2022     Left Right "   Scott Pinch 12  12    3pt Pinch 10  9    2pt Pinch 10  9          CMS Impairment/Limitation/Restriction for FOTO Hand/Wrist/Finger Survey     Therapist reviewed FOTO scores for Melani Sloan on 12/6/2022.   FOTO documents entered into [employer] - see Media section.     Limitation Score: 52.3%  Category: Self Care              Treatment       Melani received the following supervised modalities after being cleared for contradictions for 10 minutes:   - Fluidotherapy to increase blood flow and tissue elasticity     Melani received the following manual therapy techniques for 15 minutes:   - IASTM to right upper extremity  - Massage to extensor wad     Melani received therapeutic exercises for 20 minutes including:  - Manual radial nerve glides supine on mat  - Manual ulnar nerve glides supine on mat  - self performed radial n glides x10  - self performed ulnar nerve glides x10        Home Exercises and Education Provided     Education provided:   - Discussed nerve compression syndromes   - Pt took video on her phone of therapist demonstrating nerve glides   - Massage extensor wad  - Progress towards goals     Written Home Exercises Provided: yes.  Exercises were reviewed and Melani was able to demonstrate them prior to the end of the session.  Melani demonstrated good  understanding of the HEP provided.   .   See EMR under Patient Instructions for exercises provided 12/7/2022.        Assessment     Pt tolerated OT session well including manual and self performed nerve glides. Report exacerbation of symptoms following glides - OT advised to do the nerve glides sparingly at home.     Melani is progressing well towards her goals and there are no updates to goals at this time. Pt prognosis is Fair.     Pt will continue to benefit from skilled outpatient occupational therapy to address the deficits listed in the problem list on initial evaluation provide pt/family education and to maximize pt's level of independence in  the home and community environment.     Anticipated barriers to occupational therapy: none     Pt's spiritual, cultural and educational needs considered and pt agreeable to plan of care and goals.  Goals:    LTG's (8 weeks):  1)   Increase  strength to 50 lbs. to grasp heavier dogs/heavier work equipment  Ongoing   2)   Decrease complaints of pain to  2 out of 10 at worst to increase functional hand use for ADL/work/leisure activities. Ongoing   3)   Patient to score at 20% or less on FOTO to demonstrate improved perception of functional right upper extremity use. Ongoing   4)   Pt will return to near to prior level of function for ADLs and household management reporting I or Mod I with ADLs (dressing, feeding, grooming, toileting).  Ongoing   5)   Pt will reports decreased pain/discomfort will palpation of radial tunnel  Ongoing   6)   Pt will report back to prior level of function at work/no longer needing to modify her work tasks  Ongoing      STG's (4 weeks)  1)   Patient to be IND with HEP and modalities for pain/edema managment. Ongoing   2)   Increase  strength 45 lbs. to improve functional grasp for ADLs/work/leisure activities.  Ongoing   3)   Decrease complaints of pain to  5 out of 10 at worst to increase functional hand use for ADL/work/leisure activities. Ongoing   4)   Pt will report [place] with performing radial nerve glides  Ongoing            Plan      Continue to work toward goals.     Pt to be treated by Occupational Therapy 2x times per week for 8 weeks during the certification period from 12/6/2022 to 1/31/23 to achieve the established goals.      Treatment to include: Paraffin, Fluidotherapy, Manual therapy/joint mobilizations, Modalities for pain management, US 3 mhz, Therapeutic exercises/activities., Iontophoresis with 2.0 cc Dexamethasone, Strengthening, Orthotic Fabrication/Fit/Training, Edema Control, Scar Management, Wound Care, Electrical Modalities, Joint Protection, and  Energy Conservation, as well as any other treatments deemed necessary based on the patient's needs or progress.        Cecilia Felix, OT

## 2022-12-15 ENCOUNTER — CLINICAL SUPPORT (OUTPATIENT)
Dept: REHABILITATION | Facility: HOSPITAL | Age: 32
End: 2022-12-15
Payer: MEDICAID

## 2022-12-15 DIAGNOSIS — M79.641 RIGHT HAND PAIN: Primary | ICD-10-CM

## 2022-12-15 DIAGNOSIS — Z78.9 DIFFICULTY WITH ACTIVITIES OF DAILY LIVING: ICD-10-CM

## 2022-12-15 DIAGNOSIS — R29.898 RIGHT ARM WEAKNESS: ICD-10-CM

## 2022-12-15 PROCEDURE — 97530 THERAPEUTIC ACTIVITIES: CPT | Mod: PN

## 2022-12-15 NOTE — PATIENT INSTRUCTIONS
[facility]  OCCUPATIONAL THERAPY  HOME EXERCISE PROGRAM     Resisted    Squeeze putty using thumb and all fingers.      Resisted Lumbrical   Bending only at large knuckles, press putty down against thumb. Keep fingertips straight.                  PAPITO Shearer   Occupational Therapist

## 2022-12-15 NOTE — PROGRESS NOTES
"  Occupational Therapy Daily Treatment Note     Name: Melani Sloan  Clinic Number: [id number]    Therapy Diagnosis:   Encounter Diagnoses   Name Primary?    Right hand pain Yes    Right arm weakness     Difficulty with activities of daily living      Physician: Satish Clark MD    Visit Date: 12/15/2022    Physician Orders: Eval and treat - "right hand range of motion, ultrasound, deep heat massage, paraffin"  Medical Diagnosis: M65.841 (ICD-10-CM) - Other synovitis and tenosynovitis, right hand  Evaluation Date: 12/6/2022  Plan of Care Certification Date: 1/31/23  Authorization Period: 12/5/22 - 12/31/22  Surgery Date and Procedure: N/A  Date of Return to MD: TBD  Visit # / Visits authorized:  2 / 20    Time In:1345  Time Out: 1430  Total Billable Time: 45 minutes    Precautions:  Standard    Subjective     Pt reports: She reports painful/nerve/stiffness symptoms are persistent.   she was compliant with home exercise program given last session.   Response to previous treatment:2nd visit   Functional change: n/a    Pain: 3/10  Location: Right hand    Objective       Mental status: alert, oriented x3        Sensation: volar fingertips    12/6/2022 12/6/2022     Left Right   Monofilament Testing       Normal 1.65-2.83 2.83 2.83   Diminished Light Touch 3.22-3.61       Diminished Protective 3.84-4.31       Loss of Protective 4.56-6.65       Untestable >6.65                Range of Motion:   Bilateral wrist active range of motion: within normal limits but with crepitis   Bilateral digits, wrist, forearm: wnl         Special Tests:   - long finger extension right: negative  - tinels of right guyons canal: negative  - palpation of arcade of froesh right arm: positive   - ulnar n tension test right arm- positive          Strength: (RACHEL Dynamometer in psi.)        12/6/2022 12/6/2022     Left Right   Rung II 44 35   Right hand dominant         Pinch Strength (Measured in psi)       12/6/2022 12/6/2022     Left " Right   Scott Pinch 12  12    3pt Pinch 10  9    2pt Pinch 10  9          CMS Impairment/Limitation/Restriction for FOTO Hand/Wrist/Finger Survey     Therapist reviewed FOTO scores for Melani Sloan on 12/6/2022.   FOTO documents entered into [employer] - see Media section.     Limitation Score: 52.3%  Category: Self Care              Treatment       Melani received the following supervised modalities after being cleared for contradictions for 10 minutes:   - Fluidotherapy to increase blood flow and tissue elasticity     Melani received the following manual therapy techniques for 15 minutes:   - IASTM to right upper extremity  - Massage to extensor wad     Melani received therapeutic exercises for 20 minutes including:  - Manual radial nerve glides supine on mat  - Manual ulnar nerve glides supine on mat  - self performed radial n glides x10  - self performed ulnar nerve glides x10  - Upper trap stretch (bilateral) held 30 seconds x 3      Home Exercises and Education Provided     Education provided:   - Discussed nerve compression syndromes   - Pt took video on her phone of therapist demonstrating nerve glides   - Massage extensor wad  - Progress towards goals     Written Home Exercises Provided: yes.  Exercises were reviewed and Melani was able to demonstrate them prior to the end of the session.  Melani demonstrated good  understanding of the HEP provided.   .   See EMR under Patient Instructions for exercises provided 12/7/2022.        Assessment     Pt tolerated OT session well including manual and self performed nerve glides. Report exacerbation of symptoms following glides - OT advised to do the nerve glides sparingly at home.     Melani is progressing well towards her goals and there are no updates to goals at this time. Pt prognosis is Fair.     Pt will continue to benefit from skilled outpatient occupational therapy to address the deficits listed in the problem list on initial evaluation provide pt/family  education and to maximize pt's level of independence in the home and community environment.     Anticipated barriers to occupational therapy: none     Pt's spiritual, cultural and educational needs considered and pt agreeable to plan of care and goals.  Goals:    LTG's (8 weeks):  1)   Increase  strength to 50 lbs. to grasp heavier dogs/heavier work equipment  Ongoing   2)   Decrease complaints of pain to  2 out of 10 at worst to increase functional hand use for ADL/work/leisure activities. Ongoing   3)   Patient to score at 20% or less on FOTO to demonstrate improved perception of functional right upper extremity use. Ongoing   4)   Pt will return to near to prior level of function for ADLs and household management reporting I or Mod I with ADLs (dressing, feeding, grooming, toileting).  Ongoing   5)   Pt will reports decreased pain/discomfort will palpation of radial tunnel  Ongoing   6)   Pt will report back to prior level of function at work/no longer needing to modify her work tasks  Ongoing      STG's (4 weeks)  1)   Patient to be IND with HEP and modalities for pain/edema managment. Ongoing   2)   Increase  strength 45 lbs. to improve functional grasp for ADLs/work/leisure activities.  Ongoing   3)   Decrease complaints of pain to  5 out of 10 at worst to increase functional hand use for ADL/work/leisure activities. Ongoing   4)   Pt will report [place] with performing radial nerve glides  Ongoing            Plan      Continue to work toward goals. Initiate isometric strengthening.     Pt to be treated by Occupational Therapy 2x times per week for 8 weeks during the certification period from 12/6/2022 to 1/31/23 to achieve the established goals.      Treatment to include: Paraffin, Fluidotherapy, Manual therapy/joint mobilizations, Modalities for pain management, US 3 mhz, Therapeutic exercises/activities., Iontophoresis with 2.0 cc Dexamethasone, Strengthening, Orthotic Fabrication/Fit/Training,  Edema Control, Scar Management, Wound Care, Electrical Modalities, Joint Protection, and Energy Conservation, as well as any other treatments deemed necessary based on the patient's needs or progress.        Cecilia Felix, OT

## 2022-12-23 ENCOUNTER — CLINICAL SUPPORT (OUTPATIENT)
Dept: REHABILITATION | Facility: HOSPITAL | Age: 32
End: 2022-12-23
Payer: MEDICAID

## 2022-12-23 DIAGNOSIS — R29.898 RIGHT ARM WEAKNESS: ICD-10-CM

## 2022-12-23 DIAGNOSIS — Z78.9 DIFFICULTY WITH ACTIVITIES OF DAILY LIVING: ICD-10-CM

## 2022-12-23 DIAGNOSIS — M79.641 RIGHT HAND PAIN: Primary | ICD-10-CM

## 2022-12-23 PROCEDURE — 97530 THERAPEUTIC ACTIVITIES: CPT | Mod: PN

## 2022-12-23 NOTE — PROGRESS NOTES
"  Occupational Therapy Daily Treatment Note     Name: Melani Sloan  Clinic Number: [id number]    Therapy Diagnosis:   Encounter Diagnoses   Name Primary?    Right hand pain Yes    Right arm weakness     Difficulty with activities of daily living      Physician: Satish Clark MD    Visit Date: 12/23/2022    Physician Orders: Eval and treat - "right hand range of motion, ultrasound, deep heat massage, paraffin"  Medical Diagnosis: M65.841 (ICD-10-CM) - Other synovitis and tenosynovitis, right hand  Evaluation Date: 12/6/2022  Plan of Care Certification Date: 1/31/23  Authorization Period: 12/5/22 - 12/31/22  Surgery Date and Procedure: N/A  Date of Return to MD: TBD  Visit # / Visits authorized:  3 / 20    Time In:1245  Time Out: 1330  Total Billable Time: 45 minutes    Precautions:  Standard    Subjective     Pt reports: She reports her fingers feel stiff.   she was compliant with home exercise program given last session.   Response to previous treatment:2nd visit   Functional change: n/a    Pain: 3/10  Location: Right hand    Objective       Mental status: alert, oriented x3        Sensation: volar fingertips    12/6/2022 12/6/2022     Left Right   Monofilament Testing       Normal 1.65-2.83 2.83 2.83   Diminished Light Touch 3.22-3.61       Diminished Protective 3.84-4.31       Loss of Protective 4.56-6.65       Untestable >6.65                Range of Motion:   Bilateral wrist active range of motion: within normal limits but with crepitis   Bilateral digits, wrist, forearm: wnl         Special Tests:   - long finger extension right: negative  - tinels of right guyons canal: negative  - palpation of arcade of froesh right arm: positive   - ulnar n tension test right arm- positive          Strength: (RACHEL Dynamometer in psi.)        12/6/2022 12/6/2022 12/23/22     Left Right Right    Rung II 44 35 55   Right hand dominant         Pinch Strength (Measured in psi)       12/6/2022 12/6/2022     Left Right "   Scott Pinch 12  12    3pt Pinch 10  9    2pt Pinch 10  9          CMS Impairment/Limitation/Restriction for FOTO Hand/Wrist/Finger Survey     Therapist reviewed FOTO scores for Melani Sloan on 12/6/2022.   FOTO documents entered into [employer] - see Media section.     Limitation Score: 52.3%  Category: Self Care              Treatment       Melani received the following supervised modalities after being cleared for contradictions for 10 minutes:   - Fluidotherapy to increase blood flow and tissue elasticity     Melani received the following manual therapy techniques for 15 minutes:   - IASTM to right upper extremity  - Massage to extensor wad     Melani received therapeutic exercises for 20 minutes including:  - Manual radial nerve glides supine on mat  - Manual ulnar nerve glides supine on mat  - self performed radial n glides x10  - self performed ulnar nerve glides x10  - Upper trap stretch (bilateral) held 30 seconds x 3  - Chin tucks x 20  - Postural exercises:   - Supine on mat with 2 lb weights: shoulder flexion, scapular protraction (punches to ceiling)  2 x 15   - Green band: Bilateral Shoulder flexion, rows with scapular squeezes 2 x 15   - Red band: bilateral shoulder abduction, X's and T's      Home Exercises and Education Provided     Education provided:   - Discussed nerve compression syndromes   - Pt took video on her phone of therapist demonstrating nerve glides   - Massage extensor wad  - Progress towards goals     Written Home Exercises Provided: yes.  Exercises were reviewed and Melani was able to demonstrate them prior to the end of the session.  Melani demonstrated good  understanding of the HEP provided.   .   See EMR under Patient Instructions for exercises provided 12/7/2022.        Assessment     Pt tolerated OT session well including manual and self performed nerve glides. She tolerated introduction of postural exercises well. Re-assessed strength and she demonstrates increased right   strength. Issued postural exercises and red theraband for home use.     Melani is progressing well towards her goals and there are no updates to goals at this time. Pt prognosis is Fair.     Pt will continue to benefit from skilled outpatient occupational therapy to address the deficits listed in the problem list on initial evaluation provide pt/family education and to maximize pt's level of independence in the home and community environment.     Anticipated barriers to occupational therapy: none     Pt's spiritual, cultural and educational needs considered and pt agreeable to plan of care and goals.  Goals:    LTG's (8 weeks):  1)   Increase  strength to 50 lbs. to grasp heavier dogs/heavier work equipment  Met 12/23  2)   Decrease complaints of pain to  2 out of 10 at worst to increase functional hand use for ADL/work/leisure activities. Ongoing   3)   Patient to score at 20% or less on FOTO to demonstrate improved perception of functional right upper extremity use. Ongoing   4)   Pt will return to near to prior level of function for ADLs and household management reporting I or Mod I with ADLs (dressing, feeding, grooming, toileting).  Ongoing   5)   Pt will reports decreased pain/discomfort will palpation of radial tunnel  Ongoing   6)   Pt will report back to prior level of function at work/no longer needing to modify her work tasks  Ongoing      STG's (4 weeks)  1)   Patient to be IND with HEP and modalities for pain/edema managment. Ongoing   2)   Increase  strength 45 lbs. to improve functional grasp for ADLs/work/leisure activities.  Met 12/23  3)   Decrease complaints of pain to  5 out of 10 at worst to increase functional hand use for ADL/work/leisure activities. Ongoing   4)   Pt will report [place] with performing radial nerve glides  Ongoing            Plan      Continue to work toward goals.     Pt to be treated by Occupational Therapy 2x times per week for 8 weeks during the  certification period from 12/6/2022 to 1/31/23 to achieve the established goals.      Treatment to include: Paraffin, Fluidotherapy, Manual therapy/joint mobilizations, Modalities for pain management, US 3 mhz, Therapeutic exercises/activities., Iontophoresis with 2.0 cc Dexamethasone, Strengthening, Orthotic Fabrication/Fit/Training, Edema Control, Scar Management, Wound Care, Electrical Modalities, Joint Protection, and Energy Conservation, as well as any other treatments deemed necessary based on the patient's needs or progress.        Cecilia Felix, OT

## 2023-01-03 ENCOUNTER — CLINICAL SUPPORT (OUTPATIENT)
Dept: REHABILITATION | Facility: HOSPITAL | Age: 33
End: 2023-01-03
Payer: MEDICAID

## 2023-01-03 DIAGNOSIS — Z78.9 DIFFICULTY WITH ACTIVITIES OF DAILY LIVING: ICD-10-CM

## 2023-01-03 DIAGNOSIS — R29.898 RIGHT ARM WEAKNESS: ICD-10-CM

## 2023-01-03 DIAGNOSIS — M79.641 RIGHT HAND PAIN: Primary | ICD-10-CM

## 2023-01-03 PROCEDURE — 97530 THERAPEUTIC ACTIVITIES: CPT | Mod: PN

## 2023-01-03 NOTE — PROGRESS NOTES
"  Occupational Therapy Daily Treatment Note     Name: Melani Sloan  Clinic Number: [id number]    Therapy Diagnosis:   Encounter Diagnoses   Name Primary?    Right hand pain Yes    Right arm weakness     Difficulty with activities of daily living      Physician: Satish Clark MD    Visit Date: 1/3/2023    Physician Orders: Eval and treat - "right hand range of motion, ultrasound, deep heat massage, paraffin"  Medical Diagnosis: M65.841 (ICD-10-CM) - Other synovitis and tenosynovitis, right hand  Evaluation Date: 12/6/2022  Plan of Care Certification Date: 1/31/23  Authorization Period: 12/5/22 - 12/31/22  Surgery Date and Procedure: N/A  Date of Return to MD: TBD  Visit # / Visits authorized:  1 / 20  Total Visits: 4    Time In:1245  Time Out: 1330  Total Billable Time: 45 minutes    Precautions:  Standard    Subjective     Pt reports: "Most of my problems are on my right side. I'd like him (MD) to order an MRI." She reports she was lifting something   she was compliant with home exercise program given last session.   Response to previous treatment: fingers feel less stiff, but pain at the elbow and wrist levels are persistent  Functional change: n/a    Pain: 3/10  Location: Right hand    Objective       Mental status: alert, oriented x3        Sensation: volar fingertips    12/6/2022 12/6/2022     Left Right   Monofilament Testing       Normal 1.65-2.83 2.83 2.83   Diminished Light Touch 3.22-3.61       Diminished Protective 3.84-4.31       Loss of Protective 4.56-6.65       Untestable >6.65                Range of Motion:   Bilateral wrist active range of motion: within normal limits but with crepitis   Bilateral digits, wrist, forearm: wnl         Special Tests:   - long finger extension right: negative  - tinels of right guyons canal: negative  - palpation of arcade of froesh right arm: positive   - ulnar n tension test right arm- positive          Strength: (RACHEL Dynamometer in psi.)        12/6/2022 " 12/6/2022 12/23/22     Left Right Right    Rung II 44 35 55   Right hand dominant         Pinch Strength (Measured in psi)       12/6/2022 12/6/2022     Left Right   Key Pinch 12  12    3pt Pinch 10  9    2pt Pinch 10  9          CMS Impairment/Limitation/Restriction for FOTO Hand/Wrist/Finger Survey     Therapist reviewed FOTO scores for Melani Sloan on 12/6/2022.   FOTO documents entered into [employer] - see Media section.     Limitation Score: 52.3%  Category: Self Care              Treatment       Melani received the following supervised modalities after being cleared for contradictions for 10 minutes:   - Moist heat to increase blood flow and tissue elasticity     Melani received the following manual therapy techniques for 15 minutes:   - IASTM to right upper extremity  - Massage to extensor wad     Melani received therapeutic exercises for 20 minutes including:  - Manual radial nerve glides supine on mat  - Manual ulnar nerve glides supine on mat  - self performed radial n glides x10  - self performed ulnar nerve glides x10  - Upper trap stretch (bilateral) held 30 seconds x 3  - Chin tucks x 20  - Postural exercises:   - Supine on mat with 2 lb weights: shoulder flexion, scapular protraction (punches to ceiling), chest fly  2 x 15   - Green band: Bilateral Shoulder extension, rows with scapular squeezes 2 x 15   - Red band: bilateral shoulder abduction, X's and T's      Home Exercises and Education Provided     Education provided:   - Discussed nerve compression syndromes   - Pt took video on her phone of therapist demonstrating nerve glides   - Massage extensor wad  - Progress towards goals     Written Home Exercises Provided: yes.  Exercises were reviewed and Melani was able to demonstrate them prior to the end of the session.  Melani demonstrated good  understanding of the HEP provided.   .   See EMR under Patient Instructions for exercises provided 12/7/2022.        Assessment     Pt tolerated  therapeutic exercises well with crepitus noted: At the medial elbow with shoulder flexion, At the lateral elbow and dorsal wrist (DRUJ level) with wrist extension, At volar wrist DRUJ level with wrist flexion. Pt reports she'd like to have an MRI of her right arm/hand. Pt would benefit from additional imaging of the right upper extremity.     Melani is progressing well towards her goals and there are no updates to goals at this time. Pt prognosis is Fair.     Pt will continue to benefit from skilled outpatient occupational therapy to address the deficits listed in the problem list on initial evaluation provide pt/family education and to maximize pt's level of independence in the home and community environment.     Anticipated barriers to occupational therapy: none     Pt's spiritual, cultural and educational needs considered and pt agreeable to plan of care and goals.  Goals:    LTG's (8 weeks):  1)   Increase  strength to 50 lbs. to grasp heavier dogs/heavier work equipment  Met 12/23  2)   Decrease complaints of pain to  2 out of 10 at worst to increase functional hand use for ADL/work/leisure activities. Ongoing   3)   Patient to score at 20% or less on FOTO to demonstrate improved perception of functional right upper extremity use. Ongoing   4)   Pt will return to near to prior level of function for ADLs and household management reporting I or Mod I with ADLs (dressing, feeding, grooming, toileting).  Ongoing   5)   Pt will reports decreased pain/discomfort will palpation of radial tunnel  Ongoing   6)   Pt will report back to prior level of function at work/no longer needing to modify her work tasks  Ongoing      STG's (4 weeks)  1)   Patient to be IND with HEP and modalities for pain/edema managment. Ongoing   2)   Increase  strength 45 lbs. to improve functional grasp for ADLs/work/leisure activities.  Met 12/23  3)   Decrease complaints of pain to  5 out of 10 at worst to increase functional hand  use for ADL/work/leisure activities. Ongoing   4)   Pt will report [place] with performing radial nerve glides  Ongoing            Plan      Continue to work toward goals. Fax next visit's note to MD office.     Pt to be treated by Occupational Therapy 2x times per week for 8 weeks during the certification period from 12/6/2022 to 1/31/23 to achieve the established goals.      Treatment to include: Paraffin, Fluidotherapy, Manual therapy/joint mobilizations, Modalities for pain management, US 3 mhz, Therapeutic exercises/activities., Iontophoresis with 2.0 cc Dexamethasone, Strengthening, Orthotic Fabrication/Fit/Training, Edema Control, Scar Management, Wound Care, Electrical Modalities, Joint Protection, and Energy Conservation, as well as any other treatments deemed necessary based on the patient's needs or progress.        Cecilia Felix, OT

## 2023-01-06 ENCOUNTER — CLINICAL SUPPORT (OUTPATIENT)
Dept: REHABILITATION | Facility: HOSPITAL | Age: 33
End: 2023-01-06
Payer: MEDICAID

## 2023-01-06 DIAGNOSIS — M79.641 RIGHT HAND PAIN: Primary | ICD-10-CM

## 2023-01-06 DIAGNOSIS — R29.898 RIGHT ARM WEAKNESS: ICD-10-CM

## 2023-01-06 DIAGNOSIS — Z78.9 DIFFICULTY WITH ACTIVITIES OF DAILY LIVING: ICD-10-CM

## 2023-01-06 PROCEDURE — 97530 THERAPEUTIC ACTIVITIES: CPT | Mod: PN

## 2023-01-06 NOTE — PROGRESS NOTES
"  Occupational Therapy Daily Treatment Note     Name: Melani Sloan  Clinic Number: [id number]    Therapy Diagnosis:   Encounter Diagnoses   Name Primary?    Right hand pain Yes    Right arm weakness     Difficulty with activities of daily living        Physician: Satish Clark MD    Visit Date: 1/6/2023    Physician Orders: Eval and treat - "right hand range of motion, ultrasound, deep heat massage, paraffin"  Medical Diagnosis: M65.841 (ICD-10-CM) - Other synovitis and tenosynovitis, right hand  Evaluation Date: 12/6/2022  Plan of Care Certification Date: 1/31/23  Authorization Period: 12/5/22 - 12/31/22  Surgery Date and Procedure: N/A  Date of Return to MD: 1/18/23  Visit # / Visits authorized:  2 / 20  Total Visits: 5    Time In:1330  Time Out: 1415  Total Billable Time: 45 minutes    Precautions:  Standard    Subjective     Pt reports: "after my last session I had pain in my right elbow and wrist"  she was compliant with home exercise program given last session.   Response to previous treatment: fingers feel less stiff, but pain at the elbow and wrist levels are persistent  Functional change: n/a    Pain: 3/10  Location: Right hand    Objective       Mental status: alert, oriented x3        Sensation: volar fingertips    12/6/2022 12/6/2022     Left Right   Monofilament Testing       Normal 1.65-2.83 2.83 2.83   Diminished Light Touch 3.22-3.61       Diminished Protective 3.84-4.31       Loss of Protective 4.56-6.65       Untestable >6.65                Range of Motion:   Bilateral wrist active range of motion: within normal limits but with crepitis   Bilateral digits, wrist, forearm: wnl         Special Tests:   - long finger extension right: negative  - tinels of right guyons canal: negative  - palpation of arcade of froesh right arm: positive   - ulnar n tension test right arm- positive          Strength: (RACHEL Dynamometer in psi.)        12/6/2022 12/6/2022 12/23/22     Left Right Right    Rung II " "44 35 55   Right hand dominant         Pinch Strength (Measured in psi)       12/6/2022 12/6/2022     Left Right   Key Pinch 12  12    3pt Pinch 10  9    2pt Pinch 10  9          CMS Impairment/Limitation/Restriction for FOTO Hand/Wrist/Finger Survey     Therapist reviewed FOTO scores for Melani Sloan on 12/6/2022.   FOTO documents entered into [facility] - see Media section.     Limitation Score: 52.3%  Category: Self Care              Treatment       Melani received the following supervised modalities after being cleared for contradictions for 15 minutes:   - Fluidotherapy to increase blood flow and tissue elasticity     Melani received the following manual therapy techniques for 30 minutes:   - IASTM to right upper extremity  - Massage to extensor wad  - static cupping to extensor wad origin and belly    - therapy tape applied: decompression strip applies at extensor wad origin  - IASTYM stretches held 30 seconds x 3      Home Exercises and Education Provided     Education provided:   - Discussed nerve compression syndromes   - Pt took video on her phone of therapist demonstrating nerve glides   - Massage extensor wad  - Progress towards goals     Written Home Exercises Provided: yes.  Exercises were reviewed and Melani was able to demonstrate them prior to the end of the session.  Melani demonstrated good  understanding of the HEP provided.   .   See EMR under Patient Instructions for exercises provided 12/7/2022.        Assessment     She reports pain at the elbow (lateral epicondyle/extensor wad origin pain 7/10) and dorsal wrist/ulnar aspect pain (6/10) with wrist extension and flexion. Therapy helps to relieve elbow pain temporarily, however pain returns at the same intensity and wrist pain is persistent. She feels "stiffness" in digits 2, 3, and 4 right hand that has made minimal improvements but persistent. She reports paresthesia of digits 2-5 fingertips. OT performed provocative testing with positive " finding for radial tunnel syndrome. Pt reports she'd like to have an MRI of her right arm/hand. Pt would benefit from additional imaging of the right upper extremity, if MD agrees.       Melani is progressing well towards her goals and there are no updates to goals at this time. Pt prognosis is Fair.     Pt will continue to benefit from skilled outpatient occupational therapy to address the deficits listed in the problem list on initial evaluation provide pt/family education and to maximize pt's level of independence in the home and community environment.     Anticipated barriers to occupational therapy: none     Pt's spiritual, cultural and educational needs considered and pt agreeable to plan of care and goals.  Goals:    LTG's (8 weeks):  1)   Increase  strength to 50 lbs. to grasp heavier dogs/heavier work equipment  Met 12/23  2)   Decrease complaints of pain to  2 out of 10 at worst to increase functional hand use for ADL/work/leisure activities. Ongoing   3)   Patient to score at 20% or less on FOTO to demonstrate improved perception of functional right upper extremity use. Ongoing   4)   Pt will return to near to prior level of function for ADLs and household management reporting I or Mod I with ADLs (dressing, feeding, grooming, toileting).  Ongoing   5)   Pt will reports decreased pain/discomfort will palpation of radial tunnel  Ongoing   6)   Pt will report back to prior level of function at work/no longer needing to modify her work tasks  Ongoing      STG's (4 weeks)  1)   Patient to be IND with HEP and modalities for pain/edema managment. Ongoing   2)   Increase  strength 45 lbs. to improve functional grasp for ADLs/work/leisure activities.  Met 12/23  3)   Decrease complaints of pain to  5 out of 10 at worst to increase functional hand use for ADL/work/leisure activities. Ongoing   4)   Pt will report [place] with performing radial nerve glides  Ongoing            Plan      Continue to  work toward goals.     Pt to be treated by Occupational Therapy 2x times per week for 8 weeks during the certification period from 12/6/2022 to 1/31/23 to achieve the established goals.      Treatment to include: Paraffin, Fluidotherapy, Manual therapy/joint mobilizations, Modalities for pain management, US 3 mhz, Therapeutic exercises/activities., Iontophoresis with 2.0 cc Dexamethasone, Strengthening, Orthotic Fabrication/Fit/Training, Edema Control, Scar Management, Wound Care, Electrical Modalities, Joint Protection, and Energy Conservation, as well as any other treatments deemed necessary based on the patient's needs or progress.        Cecilia Felix, OT

## 2023-01-10 ENCOUNTER — CLINICAL SUPPORT (OUTPATIENT)
Dept: REHABILITATION | Facility: HOSPITAL | Age: 33
End: 2023-01-10
Payer: MEDICAID

## 2023-01-10 DIAGNOSIS — Z78.9 DIFFICULTY WITH ACTIVITIES OF DAILY LIVING: ICD-10-CM

## 2023-01-10 DIAGNOSIS — R29.898 RIGHT ARM WEAKNESS: ICD-10-CM

## 2023-01-10 DIAGNOSIS — M79.641 RIGHT HAND PAIN: Primary | ICD-10-CM

## 2023-01-10 PROCEDURE — 97530 THERAPEUTIC ACTIVITIES: CPT | Mod: PN

## 2023-01-10 NOTE — PROGRESS NOTES
"  Occupational Therapy Daily Treatment Note     Name: Melani Sloan  Clinic Number: [id number]    Therapy Diagnosis:   Encounter Diagnoses   Name Primary?    Right hand pain Yes    Right arm weakness     Difficulty with activities of daily living          Physician: Satish Clark MD    Visit Date: 1/10/2023    Physician Orders: Eval and treat - "right hand range of motion, ultrasound, deep heat massage, paraffin"  Medical Diagnosis: M65.841 (ICD-10-CM) - Other synovitis and tenosynovitis, right hand  Evaluation Date: 12/6/2022  Plan of Care Certification Date: 1/31/23  Authorization Period: 12/5/22 - 12/31/23  Surgery Date and Procedure: N/A  Date of Return to MD: 1/18/23  Visit # / Visits authorized:  4/ 20  Total Visits: 7    Time In: 2:15 pm  Time Out: 3:00 pm  Total Billable Time: 45 minutes    Precautions:  Standard    Subjective     Pt reports: "Last night, I had sharp pain in my right elbow down into my small finger and my R wrist."   she was compliant with home exercise program given last session.   Response to previous treatment: fingers feel less stiff, but pain at the elbow and wrist levels are persistent  Functional change: n/a    Pain: 8/10  Location: Right hand    Objective       Mental status: alert, oriented x3        Sensation: volar fingertips    12/6/2022 12/6/2022     Left Right   Monofilament Testing       Normal 1.65-2.83 2.83 2.83   Diminished Light Touch 3.22-3.61       Diminished Protective 3.84-4.31       Loss of Protective 4.56-6.65       Untestable >6.65                Range of Motion:   Bilateral wrist active range of motion: within normal limits but with crepitis   Bilateral digits, wrist, forearm: wnl         Special Tests:   - long finger extension right: negative  - tinels of right guyons canal: negative  - palpation of arcade of froesh right arm: positive   - ulnar n tension test right arm- positive          Strength: (RACHEL Dynamometer in psi.)        12/6/2022 12/6/2022 " 12/23/22     Left Right Right    Rung II 44 35 55   Right hand dominant         Pinch Strength (Measured in psi)       12/6/2022 12/6/2022     Left Right   Key Pinch 12  12    3pt Pinch 10  9    2pt Pinch 10  9          CMS Impairment/Limitation/Restriction for FOTO Hand/Wrist/Finger Survey     Therapist reviewed FOTO scores for Melani Sloan on 12/6/2022.   FOTO documents entered into EPIC - see Media section.     Limitation Score: 52.3%  Category: Self Care              Treatment       Melani received the following supervised modalities after being cleared for contradictions for 15 minutes:   - Fluidotherapy to increase blood flow and tissue elasticity     Melani received the following manual therapy techniques for 15 minutes:   - IASTM to right upper extremity  - Massage to extensor wad  - therapy tape applied: decompression strip applies at extensor wad origin    Melani received the following Therapeutic exercises for 15 minutes:   - IASTYM stretches held 30 seconds x 3    - Green band: Bilateral Shoulder extension, rows with scapular squeezes, pull downs 2 x 15    Home Exercises and Education Provided     Education provided:   - Discussed nerve compression syndromes, sleeping posture and gym activity.   - Progress towards goals     Written Home Exercises Provided: yes.  Exercises were reviewed and Melani was able to demonstrate them prior to the end of the session.  Melani demonstrated good  understanding of the HEP provided.   .   See EMR under Patient Instructions for exercises provided 12/7/2022.        Assessment     Pt seen on this date for her skilled OT treatment session. She continues to report pain at the elbow (lateral epicondyle/extensor wad origin pain 8/10) and dorsal wrist/ulnar aspect pain (6/10) with wrist extension and flexion.  Pt reports that therapy helps to relieve elbow pain temporarily. Postural exercises were addressed today and the patient was encouraged to continue these  exercises at home. Pt to complete a FOTO questionnaire next treatment session.     Melani is progressing well towards her goals and there are no updates to goals at this time. Pt prognosis is Fair.     Pt will continue to benefit from skilled outpatient occupational therapy to address the deficits listed in the problem list on initial evaluation provide pt/family education and to maximize pt's level of independence in the home and community environment.     Anticipated barriers to occupational therapy: none     Pt's spiritual, cultural and educational needs considered and pt agreeable to plan of care and goals.  Goals:    LTG's (8 weeks):  1)   Increase  strength to 50 lbs. to grasp heavier dogs/heavier work equipment  Met 12/23  2)   Decrease complaints of pain to  2 out of 10 at worst to increase functional hand use for ADL/work/leisure activities. Ongoing   3)   Patient to score at 20% or less on FOTO to demonstrate improved perception of functional right upper extremity use. Ongoing   4)   Pt will return to near to prior level of function for ADLs and household management reporting I or Mod I with ADLs (dressing, feeding, grooming, toileting).  Ongoing   5)   Pt will reports decreased pain/discomfort will palpation of radial tunnel  Ongoing   6)   Pt will report back to prior level of function at work/no longer needing to modify her work tasks  Ongoing      STG's (4 weeks)  1)   Patient to be IND with HEP and modalities for pain/edema managment. Ongoing   2)   Increase  strength 45 lbs. to improve functional grasp for ADLs/work/leisure activities.  Met 12/23  3)   Decrease complaints of pain to  5 out of 10 at worst to increase functional hand use for ADL/work/leisure activities. Ongoing   4)   Pt will report [place] with performing radial nerve glides  Ongoing            Plan      Continue to work toward goals.     Pt to be treated by Occupational Therapy 2x times per week for 8 weeks during the  certification period from 12/6/2022 to 1/31/23 to achieve the established goals.      Treatment to include: Paraffin, Fluidotherapy, Manual therapy/joint mobilizations, Modalities for pain management, US 3 mhz, Therapeutic exercises/activities., Iontophoresis with 2.0 cc Dexamethasone, Strengthening, Orthotic Fabrication/Fit/Training, Edema Control, Scar Management, Wound Care, Electrical Modalities, Joint Protection, and Energy Conservation, as well as any other treatments deemed necessary based on the patient's needs or progress.        Samantha Sterling, OT

## 2023-01-17 ENCOUNTER — CLINICAL SUPPORT (OUTPATIENT)
Dept: REHABILITATION | Facility: HOSPITAL | Age: 33
End: 2023-01-17
Payer: MEDICAID

## 2023-01-17 DIAGNOSIS — Z78.9 DIFFICULTY WITH ACTIVITIES OF DAILY LIVING: ICD-10-CM

## 2023-01-17 DIAGNOSIS — R29.898 RIGHT ARM WEAKNESS: ICD-10-CM

## 2023-01-17 DIAGNOSIS — M79.641 RIGHT HAND PAIN: Primary | ICD-10-CM

## 2023-01-17 PROCEDURE — 97530 THERAPEUTIC ACTIVITIES: CPT | Mod: PN

## 2023-01-17 NOTE — PROGRESS NOTES
"  Occupational Therapy Daily Treatment Note     Name: Melani Sloan  Clinic Number: [id number]    Therapy Diagnosis:   Encounter Diagnoses   Name Primary?    Right hand pain Yes    Right arm weakness     Difficulty with activities of daily living        Physician: Satish Clark MD    Visit Date: 1/17/2023    Physician Orders: Eval and treat - "right hand range of motion, ultrasound, deep heat massage, paraffin"  Medical Diagnosis: M65.841 (ICD-10-CM) - Other synovitis and tenosynovitis, right hand  Evaluation Date: 12/6/2022  Plan of Care Certification Date: 1/31/23  Authorization Period: 12/5/22 - 12/31/23  Surgery Date and Procedure: N/A  Date of Return to MD: 1/18/23  Visit # / Visits authorized:  8 / 20  Total Visits: 8    Time In: 3:05 pm  Time Out: 3:50 pm  Total Billable Time: 45 minutes    Precautions:  Standard    Subjective     Pt reports: she has pain in R lateral thumb and in forearm mass  she was compliant with home exercise program given last session.   Response to previous treatment: fingers feel less stiff, but pain at the elbow and wrist levels are persistent  Functional change: n/a    Pain: 6-7/10  Location: Right hand    Objective        Range of Motion:   Bilateral wrist active range of motion: within normal limits but with crepitis   Bilateral digits, wrist, forearm: wnl         Special Tests:   - long finger extension right: negative  - tinels of right guyons canal: negative  - palpation of arcade of froesh right arm: positive   - ulnar n tension test right arm- positive          Strength: (RACHEL Dynamometer in psi.)        12/6/2022 12/6/2022 12/23/22 1/17/23     Left Right Right  Right   Rung II 44 35 55 55   Right hand dominant         Pinch Strength (Measured in psi)       12/6/2022 12/6/2022 1/17/23     Left Right Right   Key Pinch 12  12  18   3pt Pinch 10  9  14   2pt Pinch 10  9  11         CMS Impairment/Limitation/Restriction for FOTO Hand/Wrist/Finger Survey     Therapist " reviewed FOTO scores for Melani Sloan on 1/17/2023.   FOTO documents entered into [facility] - see Media section.     Limitation Score: 39%  Category: Self Care              Treatment       Melani received the following supervised modalities after being cleared for contradictions for 15 minutes:   - Fluidotherapy to increase blood flow and tissue elasticity     Melani received the following manual therapy techniques for 15 minutes:   - IASTM and cupping to right upper extremity forearm extensors  - therapy tape applied: decompression strip applies at extensor wad origin    Melani received the following Therapeutic exercises for 15 minutes:   - IASTYM stretches held 30 seconds x 3    - Green band: Bilateral Shoulder extension, rows with scapular squeezes, pull downs, ER and Ts 2 x 15    Home Exercises and Education Provided     Education provided:   - Discussed nerve compression syndromes, sleeping posture and gym activity.   - Progress towards goals     Written Home Exercises Provided: yes.  Exercises were reviewed and Melani was able to demonstrate them prior to the end of the session.  Melani demonstrated good  understanding of the HEP provided.   .   See EMR under Patient Instructions for exercises provided 12/7/2022.        Assessment     Pt continues to report pain in R forearm extensors and R radial thumb. She reports that therapy helps to relieve elbow pain temporarily. Postural exercises were addressed today and the patient was encouraged to continue these exercises at home with green theraband that was issued. Pt to follow up with  on 1/19/23. Will await further orders.    Melani is progressing well towards her goals and there are no updates to goals at this time. Pt prognosis is Fair.     Pt will continue to benefit from skilled outpatient occupational therapy to address the deficits listed in the problem list on initial evaluation provide pt/family education and to maximize pt's level of independence  in the home and community environment.     Anticipated barriers to occupational therapy: none     Pt's spiritual, cultural and educational needs considered and pt agreeable to plan of care and goals.  Goals:    LTG's (8 weeks):  1)   Increase  strength to 50 lbs. to grasp heavier dogs/heavier work equipment  Met 12/23  2)   Decrease complaints of pain to  2 out of 10 at worst to increase functional hand use for ADL/work/leisure activities. Ongoing   3)   Patient to score at 20% or less on FOTO to demonstrate improved perception of functional right upper extremity use. Ongoing   4)   Pt will return to near to prior level of function for ADLs and household management reporting I or Mod I with ADLs (dressing, feeding, grooming, toileting).  Ongoing   5)   Pt will reports decreased pain/discomfort will palpation of radial tunnel  Ongoing   6)   Pt will report back to prior level of function at work/no longer needing to modify her work tasks  Ongoing      STG's (4 weeks)  1)   Patient to be IND with HEP and modalities for pain/edema managment. Ongoing   2)   Increase  strength 45 lbs. to improve functional grasp for ADLs/work/leisure activities.  Met 12/23  3)   Decrease complaints of pain to  5 out of 10 at worst to increase functional hand use for ADL/work/leisure activities. Ongoing   4)   Pt will report [place] with performing radial nerve glides  Ongoing            Plan      Continue to work toward goals.     Pt to be treated by Occupational Therapy 2x times per week for 8 weeks during the certification period from 12/6/2022 to 1/31/23 to achieve the established goals.      Treatment to include: Paraffin, Fluidotherapy, Manual therapy/joint mobilizations, Modalities for pain management, US 3 mhz, Therapeutic exercises/activities., Iontophoresis with 2.0 cc Dexamethasone, Strengthening, Orthotic Fabrication/Fit/Training, Edema Control, Scar Management, Wound Care, Electrical Modalities, Joint Protection,  and Energy Conservation, as well as any other treatments deemed necessary based on the patient's needs or progress.        Margaret Boasberg, OT

## 2023-03-01 DIAGNOSIS — M77.11 RIGHT LATERAL EPICONDYLITIS: Primary | ICD-10-CM

## 2023-03-13 ENCOUNTER — CLINICAL SUPPORT (OUTPATIENT)
Dept: REHABILITATION | Facility: HOSPITAL | Age: 33
End: 2023-03-13
Payer: MEDICAID

## 2023-03-13 DIAGNOSIS — R29.898 DECREASED GRIP STRENGTH: ICD-10-CM

## 2023-03-13 DIAGNOSIS — R52 PAIN: ICD-10-CM

## 2023-03-13 PROCEDURE — 97165 OT EVAL LOW COMPLEX 30 MIN: CPT

## 2023-03-13 NOTE — PATIENT INSTRUCTIONS
[facility]  OCCUPATIONAL THERAPY  HOME EXERCISE PROGRAM     Hold each stretch for 30s, repeat 3x, perform 3x/day           Therapist: NO Salamanca/KIRSTIN

## 2023-03-15 PROBLEM — R29.898 DECREASED GRIP STRENGTH: Status: ACTIVE | Noted: 2023-03-15

## 2023-03-15 PROBLEM — R52 PAIN: Status: ACTIVE | Noted: 2023-03-15

## 2023-03-16 ENCOUNTER — CLINICAL SUPPORT (OUTPATIENT)
Dept: REHABILITATION | Facility: HOSPITAL | Age: 33
End: 2023-03-16
Payer: MEDICAID

## 2023-03-16 DIAGNOSIS — R29.898 DECREASED GRIP STRENGTH: ICD-10-CM

## 2023-03-16 DIAGNOSIS — R52 PAIN: Primary | ICD-10-CM

## 2023-03-16 PROCEDURE — 97530 THERAPEUTIC ACTIVITIES: CPT

## 2023-03-16 NOTE — PLAN OF CARE
[facility]  Occupational Therapy Initial Evaluation    Date: 3/13/2023  Name: Melani Sloan  Clinic Number: [id number]    Therapy Diagnosis:   Encounter Diagnoses   Name Primary?    Pain     Decreased  strength      Physician: Satish Clark MD    Physician Orders: Eval and treat; ROM, ultrasound, massage, paraffin, slow progressive resistance exercises   Medical Diagnosis: M77.11 (ICD-10-CM) - Right lateral epicondylitis  Date of Injury/Onset: December 2022  Evaluation Date: 3/13/2023  Insurance Authorization Period Expiration: 03/09/2024  Plan of Care Expiration: 8 weeks; 5/12/2023  Date of Return to MD: 3/22/2023   Visit # / Visits authorized: 1 / 1  FOTO: (date and score)    Precautions:  Standard hx of seizure     Time In: 09:55 AM   Time Out: 11:00 AM  Total Appointment Time (timed & untimed codes): 65 minutes    SUBJECTIVE     Date of Onset: 2-3 months     History of Current Condition/Mechanism of Injury: Melani reports:  Her R elbow started to hurt 2-3 months ago. Reports she started drinking a drink, Nopal Cactus, in the AM that her sister in law mother started to drink for relief with same issue. Denies any relief. She also stated she began to have numbness in her LF, RF, and SF ~4-5 weeks ago.  Reported it was intermittent for a while but now is constant. Hx of CTS bilateral wrists. Per pt medical hx she has been treated for stenosing tenosynovitis in R wrist as well as arthralgia in bilateral hands and digits.     Falls: 0    Involved Side: Right  Dominant Side: Right  Imaging:  outside source   Prior Therapy: yes - multiple   Occupation:     Working presently: part time   Duties: hours 8:30-2     Functional Limitations/Social History:    Previous functional status includes: Independent with all ADLs.     Current Functional Status   Home/Living environment: lives alone      Limitation of Functional Status as follows:   ADLs/IADLs:     - Feeding: I; unable to   heavy items at grocery store     - Bathing: I; pain in R shoulder     - Dressing/Grooming: I    - Driving: mod I; has to switch hands when driving      Leisure: gym, more legs    Pain:  Functional Pain Scale Rating 0-10: Current 9/10, worst 10/10, best 8/10   Location: lateral elbow- radiates distally to wrist and digits 2-5  Description: Aching, Tingling, Numb, and Shooting  Aggravating Factors: working longer hours, picking up  Easing Factors: ice     Patient's Goals for Therapy: Pt would like to her elbow to get better     Medical History:   Past Medical History:   Diagnosis Date    Seizures        Surgical History:    has a past surgical history that includes Vagus nerve stimulator insertion.    Medications:   has a current medication list which includes the following prescription(s): brivaracetam, brivaracetam, felbamate, hydrocodone-acetaminophen, naproxen, and zonisamide.    Allergies:   Review of patient's allergies indicates:   Allergen Reactions    Benadryl [diphenhydramine hcl]      Drug interactions          OBJECTIVE     Observation/Appearance: forward posture     Edema. Measured in centimeters.   3/15/2023 3/15/2023    Left Right   2in. Above elbow 24.5  24.5    2in. Below elbow 24.8  23.9   Wrist Crease     Figure of 8     MCPs       Bilateral shoulder flexion WNL   Bilateral shoulder abduction ~75% WNL       Elbow and Wrist ROM. Measured in degrees.   3/15/2023 3/15/2023    Left Right   Elbow Ext/Flex  WNL   Supination/Pronation  WNL   Wrist Ext/Flex 49/74 55/70   Wrist RD/UD 30/30 25/45     Hand ROM. Measured in degrees.   3/15/2023 3/15/2023    Left Right        Index: MP   Able to make full composite fist    Thumb: MP                  IP         Rad ADD/ABD         Pal ADD/ABD            Opposition  DPC       Strength (Dynamometer) and Pinch Strength (Pinch Gauge)  Measured in pounds. B   3/15/2023 3/15/2023    Left Right   Rung II; flexed    Extended 24      30 33       33   Scott Pinch  11 11.5   3pt Pinch 11 9   2pt Pinch       Sensation   3/13/2023 3/13/2023    Left Right   [place] Jose     Normal 1.65-2.83     Diminished Light Touch 3.22-3.61     Diminished Protective 3.84-4.31     Loss of Protective 4.56-6.65     Untestable >6.65     2 Point Discrimination     Static     Dynamic       Manual Muscle Test   3/15/2023 3/15/2023    Left Right   Wrist Extension  3+ 3   Wrist Flexion     Radial Deviation     Ulnar Deviation     Supination     Pronation     Elbow Extension     Elbow Flexion       Special Tests  Thumb CMC Grind Test    Finkelstein's Test    Phalen's Test    Scratch Collapse Test    Tennis Elbow Test pos   Resisted Middle Finger Extension Test pos   Mills Test        Limitation/Restriction for FOTO initial eval;  Survey    Therapist reviewed FOTO scores for Melani Sloan on 3/13/2023.   FOTO documents entered into [employer] - see Media section.    Limitation Score: 61%         Treatment   Total Treatment time (time-based codes) separate from Evaluation: 11 minutes    Melani received the treatments listed below:     Supervised modalities after being cleared for contradictions: Hot Pack - 6 minutes       Therapeutic exercises to develop flexibility for 5 minutes, including:  - astym stretches holding for 30s x 3 reps       Patient Education and Home Exercises      Education provided:   - sleeping positions    Written Home Exercises Provided: yes.  Exercises were reviewed and Melani was able to demonstrate them prior to the end of the session.  Melani demonstrated good  understanding of the education provided. See EMR under Patient Instructions for exercises provided during therapy sessions.     Pt was advised to perform these exercises free of pain, and to stop performing them if pain occurs.    Patient/Family Education: role of OT, goals for OT, scheduling/cancellations - pt verbalized understanding. Discussed insurance limitations with patient.    ASSESSMENT     Melani ALEKSANDRA Sloan is a  33 y.o. female referred to outpatient occupational therapy and presents with a medical diagnosis of R lateral epicondylitis.  Patient presents with the following therapy deficits: Decreased  strength, Decreased muscle strength, Decreased functional hand use, Increased pain, and Edema and demonstrates limitations as described in the chart below. Following medical record review it is determined that pt will benefit from occupational therapy services in order to maximize pain free and/or functional use of right elbow. The following goals were discussed with the patient and patient is in agreement with them as to be addressed in the treatment plan. The patient's rehab potential is Good.     Anticipated barriers to occupational therapy: n/a  Pt has no cultural, educational or language barriers to learning provided.    Profile and History Assessment of Occupational Performance Level of Clinical Decision Making Complexity Score   Occupational Profile:   Melani Sloan is a 33 y.o. female who lives alone and is currently employed Melani Sloan has difficulty with  ADLs and IADLs as listed previously, which  Affecting herdaily functional abilities.      Comorbidities:    has a past medical history of Seizures.    Medical and Therapy History Review:   Brief               Performance Deficits    Physical:  Joint Mobility    Cognitive:  No Deficits    Psychosocial:    Habits  Routines  Rituals     Clinical Decision Making:  low    Assessment Process:  Problem-Focused Assessments    Modification/Need for Assistance:  Not Necessary    Intervention Selection:  Several Treatment Options       low  Based on PMHX, co morbidities , data from assessments and functional level of assistance required with task and clinical presentation directly impacting function.         Goals:   The following goals were discussed with the patient and patient is in agreement with them as to be addressed in the treatment plan.   Long Term Goals  (LTGs); to be met by discharge.  Pt will demo improved  strength by 8 pounds   Pt report FOTO score of 41% limitation or below  Pt will report pain level no greater than 1-2/10 during functional daily work and community activities   Pt will demo improved pinch strength by 3 pounds     Short Term Goals (STGs); to be met within 4 weeks ().  Pt will demo improved  strength by 5 pounds   Pt will report improvement in FOTO score by decreasing limitation by 5-8% points.  Pt will report pain level no greater than 2-3/10 during light activity   Pt will demo improved pinch strength by 2 pounds         PLAN   Plan of Care Certification: 3/13/2023 to 5/12/2023.     Outpatient Occupational Therapy 1-2 times weekly for 8 weeks to include the following interventions: Paraffin, Fluidotherapy, Manual therapy/joint mobilizations, Modalities for pain management, US 3 mhz, Therapeutic exercises/activities., Iontophoresis with 2.0 cc Dexamethasone, Strengthening, Orthotic Fabrication/Fit/Training, Joint Protection, and Energy Conservation.      Denisha Humphrey OT      I CERTIFY THE NEED FOR THESE SERVICES FURNISHED UNDER THIS PLAN OF TREATMENT AND WHILE UNDER MY CARE  Physician's comments:      Physician's Signature: ___________________________________________________

## 2023-03-16 NOTE — PROGRESS NOTES
"  [facility]  Occupational Therapy Treatment Note    Date: 3/16/2023  Name: Melani Sloan  Clinic Number: [id number]    Therapy Diagnosis:   Encounter Diagnoses   Name Primary?    Pain Yes    Decreased  strength      Physician: Satish Clark MD    Physician Orders: Eval and treat; ROM, ultrasound, massage, paraffin, slow progressive resistance exercises   Medical Diagnosis: M77.11 (ICD-10-CM) - Right lateral epicondylitis  Date of Injury/Onset: December 2022  Evaluation Date: 3/13/2023  Insurance Authorization Period Expiration: 03/09/2024  Plan of Care Expiration: 8 weeks; 5/12/2023  Date of Return to MD: 3/22/2023   Visit # / Visits authorized: 1 / 1  FOTO: (date and score)     Precautions:  Standard hx of seizure      Time In: 1:15 PM   Time Out:  02:02 PM  Total Appointment Time (timed & untimed codes): 47 minutes    SUBJECTIVE     Pt reports: "I don't know why but my L started really hurting me yesterday.   She was compliant with home exercise program given last session.   Response to previous treatment: first tx   Functional change: none noted to this date     Pain: 6-7/10  Location: right elbow    OBJECTIVE   Objective Measures updated at progress report unless specified.    Observation/Appearance: forward posture      Edema. Measured in centimeters.    3/15/2023 3/15/2023     Left Right   2in. Above elbow 24.5  24.5    2in. Below elbow 24.8  23.9   Wrist Crease       Figure of 8       MCPs          Bilateral shoulder flexion WNL   Bilateral shoulder abduction ~75% WNL      Elbow and Wrist ROM. Measured in degrees.    3/15/2023 3/15/2023     Left Right   Elbow Ext/Flex   WNL   Supination/Pronation   WNL   Wrist Ext/Flex 49/74 55/70   Wrist RD/UD 30/30 25/45      Hand ROM. Measured in degrees.    3/15/2023 3/15/2023     Left Right           Index: MP    Able to make full composite fist    Thumb: MP                    IP           Rad ADD/ABD           Pal ADD/ABD              " Opposition   DPC        Strength (Dynamometer) and Pinch Strength (Pinch Gauge)  Measured in pounds. B    3/15/2023 3/15/2023     Left Right   Rung II; flexed     Extended 24        30 33         33   Scott Pinch 11 11.5   3pt Pinch 11 9   2pt Pinch          Sensation    3/13/2023 3/13/2023     Left Right   [place] Jose       Normal 1.65-2.83       Diminished Light Touch 3.22-3.61       Diminished Protective 3.84-4.31       Loss of Protective 4.56-6.65       Untestable >6.65       2 Point Discrimination       Static       Dynamic          Manual Muscle Test    3/15/2023 3/15/2023     Left Right   Wrist Extension  3+ 3   Wrist Flexion       Radial Deviation       Ulnar Deviation       Supination       Pronation       Elbow Extension       Elbow Flexion          Special Tests  Thumb CMC Grind Test     Finkelstein's Test     Phalen's Test     Scratch Collapse Test     Tennis Elbow Test pos   Resisted Middle Finger Extension Test pos   Mills Test           Limitation/Restriction for FOTO initial eval;  Survey     Therapist reviewed FOTO scores for Melani Sloan on 3/13/2023.   FOTO documents entered into [facility] - see Media section.     Limitation Score: 61%            Treatment     Melani received the treatments listed below:     Supervised modalities after being cleared for contradictions: Hot Pack - 10 minutes         Manual therapy techniques: Soft tissue Mobilization were applied to the: R extensors for 10 minutes, including:  - STM using hawk's  extensors       Therapeutic activities to improve functional performance for 27  minutes, including:  - prone rows, extension, horizontal abduction x 10 reps   - astym stretch holding for 30s x 3 reps   - eccentric wrist extension with 1# DB x 10 reps (2 sets)   - eccentric RD with 1# DB x 10 reps (2 sets)   - jessica twist red flexbar x 10 reps     Patient Education and Home Exercises      Education provided:   - eccentric   - Progress towards goals     Written  Home Exercises Provided: yes.  Exercises were reviewed and Melani was able to demonstrate them prior to the end of the session.  Melani demonstrated good  understanding of the HEP provided. See EMR under Patient Instructions for exercises provided during therapy sessions.      ASSESSMENT      Scapula weakness noted. Min tactile cue for proper form with RTC strengthening. Able to ward eccentric strengthening with R elbow. Will progress as ward. Informed pt she may benefit from seeing ortho doctor to evaluate her back and neck since she is being affected bilaterally in her arms.     Melani is progressing well towards her goals and there are no updates to goals at this time. Pt prognosis is Fair.     Pt will continue to benefit from skilled outpatient occupational therapy to address the deficits listed in the problem list on initial evaluation, provide pt/family education and to maximize pt's level of independence in the home and community environment.     Pt's spiritual, cultural and educational needs considered and pt agreeable to plan of care and goals.    Anticipated barriers to occupational therapy: n/a     Goals:  Long Term Goals (LTGs); to be met by discharge.  Pt will demo improved  strength by 8 pounds   Pt report FOTO score of 41% limitation or below  Pt will report pain level no greater than 1-2/10 during functional daily work and community activities   Pt will demo improved pinch strength by 3 pounds      Short Term Goals (STGs); to be met within 4 weeks ().  Pt will demo improved  strength by 5 pounds   Pt will report improvement in FOTO score by decreasing limitation by 5-8% points.  Pt will report pain level no greater than 2-3/10 during light activity   Pt will demo improved pinch strength by 2 pounds     PLAN     Continue skilled occupational therapy with individualized plan of care focusing on improving functional independence with use of RUE    Updates/Grading for next session: cont as ward      Denisha Humphrey, OT

## 2023-03-17 DIAGNOSIS — G40.219 LOCALIZATION-RELATED SYMPTOMATIC EPILEPSY AND EPILEPTIC SYNDROMES WITH COMPLEX PARTIAL SEIZURES, INTRACTABLE, WITHOUT STATUS EPILEPTICUS: Primary | ICD-10-CM

## 2023-03-21 ENCOUNTER — CLINICAL SUPPORT (OUTPATIENT)
Dept: REHABILITATION | Facility: HOSPITAL | Age: 33
End: 2023-03-21
Payer: MEDICAID

## 2023-03-21 DIAGNOSIS — R52 PAIN: Primary | ICD-10-CM

## 2023-03-21 DIAGNOSIS — R29.898 DECREASED GRIP STRENGTH: ICD-10-CM

## 2023-03-21 PROCEDURE — 97530 THERAPEUTIC ACTIVITIES: CPT | Mod: CO

## 2023-03-21 NOTE — PROGRESS NOTES
"  [facility]  Occupational Therapy Treatment Note    Date: 3/21/2023  Name: Melani Sloan  Clinic Number: [id number]    Therapy Diagnosis:   Encounter Diagnoses   Name Primary?    Pain Yes    Decreased  strength      Physician: Satish Clark MD    Physician Orders: Eval and treat; ROM, ultrasound, massage, paraffin, slow progressive resistance exercises   Medical Diagnosis: M77.11 (ICD-10-CM) - Right lateral epicondylitis  Date of Injury/Onset: December 2022  Evaluation Date: 3/13/2023  Insurance Authorization Period Expiration: 03/09/2024  Plan of Care Expiration: 8 weeks; 5/12/2023  Date of Return to MD: 3/22/2023   Visit # / Visits authorized: 1 / 20  FOTO: (date and score)     Precautions:  Standard hx of seizure      Time In: 10:30 PM   Time Out:  11:48 PM  Total Appointment Time (timed & untimed codes): 72 minutes    SUBJECTIVE     Pt reports: "Both my elbows are hurting now, the L more than the right."  She was compliant with home exercise program given last session.   Response to previous treatment: none noted   Functional change: none noted to this date     Pain: 6-7/10  Location: right elbow    OBJECTIVE   Objective Measures updated at progress report unless specified.    Observation/Appearance: forward posture      Edema. Measured in centimeters.    3/15/2023 3/15/2023     Left Right   2in. Above elbow 24.5  24.5    2in. Below elbow 24.8  23.9   Wrist Crease       Figure of 8       MCPs          Bilateral shoulder flexion WNL   Bilateral shoulder abduction ~75% WNL      Elbow and Wrist ROM. Measured in degrees.    3/15/2023 3/15/2023     Left Right   Elbow Ext/Flex   WNL   Supination/Pronation   WNL   Wrist Ext/Flex 49/74 55/70   Wrist RD/UD 30/30 25/45      Hand ROM. Measured in degrees.    3/15/2023 3/15/2023     Left Right           Index: MP    Able to make full composite fist    Thumb: MP                    IP           Rad ADD/ABD           Pal ADD/ABD              " Opposition   DPC        Strength (Dynamometer) and Pinch Strength (Pinch Gauge)  Measured in pounds. B    3/15/2023 3/15/2023     Left Right   Rung II; flexed     Extended 24        30 33         33   Scott Pinch 11 11.5   3pt Pinch 11 9   2pt Pinch          Sensation    3/13/2023 3/13/2023     Left Right   [place]       Normal 1.65-2.83       Diminished Light Touch 3.22-3.61       Diminished Protective 3.84-4.31       Loss of Protective 4.56-6.65       Untestable >6.65       2 Point Discrimination       Static       Dynamic          Manual Muscle Test    3/15/2023 3/15/2023     Left Right   Wrist Extension  3+ 3   Wrist Flexion       Radial Deviation       Ulnar Deviation       Supination       Pronation       Elbow Extension       Elbow Flexion          Special Tests  Thumb CMC Grind Test     Finkelstein's Test     Phalen's Test     Scratch Collapse Test     Tennis Elbow Test pos   Resisted Middle Finger Extension Test pos   Mills Test           Limitation/Restriction for FOTO initial eval;  Survey     Therapist reviewed FOTO scores for Melani Sloan on 3/13/2023.   FOTO documents entered into [employer] - see Media section.     Limitation Score: 61%            Treatment     Melani received the treatments listed below:     Supervised modalities after being cleared for contradictions: Hot Pack - 10 minutes     Patient received cold pack x 8 minutes to decrease pain/inflammation and edema following treatment session.     Manual therapy techniques: Soft tissue Mobilization were applied to the: R extensors for 15 minutes, including:  - STM using hawk's  extensors   - ktape for lateral epicondylitis       Therapeutic activities to improve functional performance for 39  minutes, including:  - prone rows (1#), extension, horizontal abduction, scaption x 10 reps   - astym stretch holding for 30s x 3 reps   - eccentric wrist extension with 1# DB x 10 reps (2 sets)   - eccentric RD with 1# DB x 10 reps (2 sets)  (NT)  - jessica twist red flexbar x 10 reps   - oscillations x2 min   - wrist flex/ext isometric x10   - scap AROM: elevation, retraction, rolls x15 ea   - serratus anterior rolls on wall with foam roller  - radial nerve glides     Patient Education and Home Exercises      Education provided:   - eccentric   - Progress towards goals     Written Home Exercises Provided: yes.  Exercises were reviewed and Melani was able to demonstrate them prior to the end of the session.  Melani demonstrated good  understanding of the HEP provided. See EMR under Patient Instructions for exercises provided during therapy sessions.      ASSESSMENT      Continues to demo decr scapula strength and stability with cues for proper technique of exercises. Difficulty tolerating eccentric strengthening with R elbow. Will progress as ward. Informed pt she may benefit from seeing ortho doctor to evaluate her back and neck since she is being affected bilaterally in her arms.     Melani is progressing well towards her goals and there are no updates to goals at this time. Pt prognosis is Fair.     Pt will continue to benefit from skilled outpatient occupational therapy to address the deficits listed in the problem list on initial evaluation, provide pt/family education and to maximize pt's level of independence in the home and community environment.     Pt's spiritual, cultural and educational needs considered and pt agreeable to plan of care and goals.    Anticipated barriers to occupational therapy: n/a     Goals:  Long Term Goals (LTGs); to be met by discharge.  Pt will demo improved  strength by 8 pounds   Pt report FOTO score of 41% limitation or below  Pt will report pain level no greater than 1-2/10 during functional daily work and community activities   Pt will demo improved pinch strength by 3 pounds      Short Term Goals (STGs); to be met within 4 weeks ().  Pt will demo improved  strength by 5 pounds   Pt will report improvement  in FOTO score by decreasing limitation by 5-8% points.  Pt will report pain level no greater than 2-3/10 during light activity   Pt will demo improved pinch strength by 2 pounds     PLAN     Continue skilled occupational therapy with individualized plan of care focusing on improving functional independence with use of RUE    Updates/Grading for next session: cont as ward     CHINMAY Mcdonough/KIRSTIN

## 2023-03-23 ENCOUNTER — CLINICAL SUPPORT (OUTPATIENT)
Dept: REHABILITATION | Facility: HOSPITAL | Age: 33
End: 2023-03-23
Payer: MEDICAID

## 2023-03-23 DIAGNOSIS — M77.11 RIGHT LATERAL EPICONDYLITIS: Primary | ICD-10-CM

## 2023-03-23 DIAGNOSIS — M77.11 RIGHT LATERAL EPICONDYLITIS: ICD-10-CM

## 2023-03-23 PROCEDURE — 97530 THERAPEUTIC ACTIVITIES: CPT

## 2023-03-23 NOTE — PROGRESS NOTES
"  [facility]  Occupational Therapy Treatment Note    Date: 3/23/2023  Name: Melani Sloan  Clinic Number: [id number]    Therapy Diagnosis:   No diagnosis found.    Physician: Satish Clark MD    Physician Orders: Eval and treat; ROM, ultrasound, massage, paraffin, slow progressive resistance exercises   Medical Diagnosis: M77.11 (ICD-10-CM) - Right lateral epicondylitis  Date of Injury/Onset: December 2022  Evaluation Date: 3/13/2023  Insurance Authorization Period Expiration: 03/09/2024  Plan of Care Expiration: 8 weeks; 5/12/2023  Date of Return to MD: 3/22/2023   Visit # / Visits authorized: 2 / 20  FOTO: (date and score)     Precautions:  Standard hx of seizure      Time In: *** PM   Time Out:  *** PM  Total Appointment Time (timed & untimed codes): *** minutes    SUBJECTIVE     Pt reports: "Both my elbows are hurting now, the L more than the right." ***  She was compliant with home exercise program given last session.   Response to previous treatment: none noted   Functional change: none noted to this date     Pain: 6-7/10  Location: right elbow    OBJECTIVE   Objective Measures updated at progress report unless specified.    Observation/Appearance: forward posture      Edema. Measured in centimeters.    3/15/2023 3/15/2023     Left Right   2in. Above elbow 24.5  24.5    2in. Below elbow 24.8  23.9   Wrist Crease       Figure of 8       MCPs          Bilateral shoulder flexion WNL   Bilateral shoulder abduction ~75% WNL      Elbow and Wrist ROM. Measured in degrees.    3/15/2023 3/15/2023     Left Right   Elbow Ext/Flex   WNL   Supination/Pronation   WNL   Wrist Ext/Flex 49/74 55/70   Wrist RD/UD 30/30 25/45      Hand ROM. Measured in degrees.    3/15/2023 3/15/2023     Left Right           Index: MP    Able to make full composite fist    Thumb: MP                    IP           Rad ADD/ABD           Pal ADD/ABD              Opposition   DPC        Strength (Dynamometer) and " Pinch Strength (Pinch Gauge)  Measured in pounds. B    3/15/2023 3/15/2023     Left Right   Rung II; flexed     Extended 24        30 33         33   Scott Pinch 11 11.5   3pt Pinch 11 9   2pt Pinch          Sensation    3/13/2023 3/13/2023     Left Right   [place]       Normal 1.65-2.83       Diminished Light Touch 3.22-3.61       Diminished Protective 3.84-4.31       Loss of Protective 4.56-6.65       Untestable >6.65       2 Point Discrimination       Static       Dynamic          Manual Muscle Test    3/15/2023 3/15/2023     Left Right   Wrist Extension  3+ 3   Wrist Flexion       Radial Deviation       Ulnar Deviation       Supination       Pronation       Elbow Extension       Elbow Flexion          Special Tests  Thumb CMC Grind Test     Finkelstein's Test     Phalen's Test     Scratch Collapse Test     Tennis Elbow Test pos   Resisted Middle Finger Extension Test pos   Mills Test           Limitation/Restriction for FOTO initial eval;  Survey     Therapist reviewed FOTO scores for Melani Sloan on 3/13/2023.   FOTO documents entered into [employer] - see Media section.     Limitation Score: 61%            Treatment     Melani received the treatments listed below:     Supervised modalities after being cleared for contradictions: Hot Pack - 10 minutes     Patient received cold pack x 8 minutes to decrease pain/inflammation and edema following treatment session.     Manual therapy techniques: Soft tissue Mobilization were applied to the: R extensors for 15 minutes, including:  - STM using hawk's  extensors   - ktape for lateral epicondylitis       Therapeutic activities to improve functional performance for *** minutes, including:  - prone rows (1#), extension, horizontal abduction, scaption x 10 reps   - astym stretch holding for 30s x 3 reps   - eccentric wrist extension with 1# DB x 10 reps (2 sets)   - eccentric RD with 1# DB x 10 reps (2 sets) (NT)  - jessica twist red flexbar x 10 reps   -  oscillations x2 min   - wrist flex/ext isometric x10   - scap AROM: elevation, retraction, rolls x15 ea   - serratus anterior rolls on wall with foam roller  - radial nerve glides     Patient Education and Home Exercises      Education provided:   - eccentric   - Progress towards goals     Written Home Exercises Provided: yes.  Exercises were reviewed and Melani was able to demonstrate them prior to the end of the session.  Melani demonstrated good  understanding of the HEP provided. See EMR under Patient Instructions for exercises provided during therapy sessions.      ASSESSMENT      Continues to demo decr scapula strength and stability with cues for proper technique of exercises. Difficulty tolerating eccentric strengthening with R elbow. Will progress as ward. Informed pt she may benefit from seeing ortho doctor to evaluate her back and neck since she is being affected bilaterally in her arms. ***    Melani is progressing well towards her goals and there are no updates to goals at this time. Pt prognosis is Fair.     Pt will continue to benefit from skilled outpatient occupational therapy to address the deficits listed in the problem list on initial evaluation, provide pt/family education and to maximize pt's level of independence in the home and community environment.     Pt's spiritual, cultural and educational needs considered and pt agreeable to plan of care and goals.    Anticipated barriers to occupational therapy: n/a     Goals:  Long Term Goals (LTGs); to be met by discharge.  Pt will demo improved  strength by 8 pounds   Pt report FOTO score of 41% limitation or below  Pt will report pain level no greater than 1-2/10 during functional daily work and community activities   Pt will demo improved pinch strength by 3 pounds      Short Term Goals (STGs); to be met within 4 weeks ().  Pt will demo improved  strength by 5 pounds   Pt will report improvement in FOTO score by decreasing limitation by  5-8% points.  Pt will report pain level no greater than 2-3/10 during light activity   Pt will demo improved pinch strength by 2 pounds     PLAN     Continue skilled occupational therapy with individualized plan of care focusing on improving functional independence with use of RUE    Updates/Grading for next session: cont as CHINMAY Lewis/KIRSTIN

## 2023-03-23 NOTE — PATIENT INSTRUCTIONS
Exercises  - Supine Chest Stretch on Foam Roll  - 2 x daily - 7 x weekly - 2 sets - 10 reps  - Seated Scapular Retraction  - 2 x daily - 7 x weekly - 2 sets - 10 reps - 5 hold  - Prone Single Arm Shoulder Y  - 2 x daily - 7 x weekly - 2 sets - 10 reps  - Prone Shoulder Horizontal Abduction  - 2 x daily - 7 x weekly - 2 sets - 10 reps  - Prone Shoulder Extension - Single Arm  - 2 x daily - 7 x weekly - 2 sets - 10 reps  - Prone Shoulder Row  - 2 x daily - 7 x weekly - 2 sets - 10 reps  - Standing Wrist Extension Stretch  - 1 x daily - 7 x weekly - 3 sets - 1 reps  - Standing Wrist Flexion Stretch  - 1 x daily - 7 x weekly - 3 sets - 10 reps  - Standing Single Arm Eccentric Bicep Curl Pronated then Supinated  - 2 x daily - 7 x weekly - 1 sets - 10 reps

## 2023-03-23 NOTE — PROGRESS NOTES
[facility]  Occupational Therapy Treatment Note    Date: 3/23/2023  Name: Melani Sloan  Clinic Number: [id number]    Therapy Diagnosis:   Encounter Diagnosis   Name Primary?    Right lateral epicondylitis      Physician: Satish Clark MD    Physician Orders: Eval and treat; ROM, ultrasound, massage, paraffin, slow progressive resistance exercises   Medical Diagnosis: M77.11 (ICD-10-CM) - Right lateral epicondylitis  Date of Injury/Onset: December 2022  Evaluation Date: 3/13/2023  Insurance Authorization Period Expiration: 03/09/2024  Plan of Care Expiration: 8 weeks; 5/12/2023  Date of Return to MD: 3/22/2023   Visit # / Visits authorized: 1 / 20  FOTO: (date and score)       Precautions:  Standard hx of seizure      Time In: 09:27 AM   Time Out: 10:25 AM  Total Appointment Time (timed & untimed codes): 58 minutes    SUBJECTIVE     Pt reports: R elbow feels less painful compared to last visit.   She was compliant with home exercise program given last session.   Response to previous treatment: none noted   Functional change: none noted to this date     Pain: 6/10 (R)   Location: right elbow    OBJECTIVE   Objective Measures updated at progress report unless specified.    Observation/Appearance: forward posture      Edema. Measured in centimeters.    3/15/2023 3/15/2023     Left Right   2in. Above elbow 24.5  24.5    2in. Below elbow 24.8  23.9   Wrist Crease       Figure of 8       MCPs          Bilateral shoulder flexion WNL   Bilateral shoulder abduction ~75% WNL      Elbow and Wrist ROM. Measured in degrees.    3/15/2023 3/15/2023     Left Right   Elbow Ext/Flex   WNL   Supination/Pronation   WNL   Wrist Ext/Flex 49/74 55/70   Wrist RD/UD 30/30 25/45      Hand ROM. Measured in degrees.    3/15/2023 3/15/2023     Left Right           Index: MP    Able to make full composite fist    Thumb: MP                    IP           Rad ADD/ABD           Pal ADD/ABD              Opposition    DPC        Strength (Dynamometer) and Pinch Strength (Pinch Gauge)  Measured in pounds. B    3/15/2023 3/15/2023     Left Right   Rung II; flexed     Extended 24        30 33         33   Scott Pinch 11 11.5   3pt Pinch 11 9   2pt Pinch          Sensation    3/13/2023 3/13/2023     Left Right   [place] Jose       Normal 1.65-2.83       Diminished Light Touch 3.22-3.61       Diminished Protective 3.84-4.31       Loss of Protective 4.56-6.65       Untestable >6.65       2 Point Discrimination       Static       Dynamic          Manual Muscle Test    3/15/2023 3/15/2023     Left Right   Wrist Extension  3+ 3   Wrist Flexion       Radial Deviation       Ulnar Deviation       Supination       Pronation       Elbow Extension       Elbow Flexion          Special Tests  Thumb CMC Grind Test     Finkelstein's Test     Phalen's Test     Scratch Collapse Test     Tennis Elbow Test pos   Resisted Middle Finger Extension Test pos   Mills Test           Limitation/Restriction for FOTO initial eval;  Survey     Therapist reviewed FOTO scores for Melani Sloan on 3/13/2023.   FOTO documents entered into [employer] - see Media section.     Limitation Score: 61%            Treatment     Melani received the treatments listed below:     Supervised modalities after being cleared for contradictions: Hot Pack - 10 minutes       Manual therapy techniques: Soft tissue Mobilization were applied to the: R extensors for 8 minutes, including:  - STM star tool extensors   - ktape for lateral epicondylitis       Therapeutic activities to improve functional performance for 39  minutes, including:  - prone rows (1#), extension, horizontal abduction, scaption x 10 reps   - astym stretch holding for 30s x 3 reps   - eccentric wrist extension with 1# DB x 10 reps (2 sets)   - eccentric RD with 1# DB x 10 reps (2 sets) (NT)  - jessica twist red flexbar x 10 reps   - oscillations x 2 min   - wrist flex/ext isometric x10   - scap AROM: elevation,  retraction, rolls x 15 ea   - serratus anterior rolls on wall with foam roller x 10 reps (2 sets)   - radial nerve glides   - wrist eccentric with bar and red theraband x 10 reps (2 sets)       Patient Education and Home Exercises      Education provided:   - updated HEP with [employer]   - Progress towards goals     Written Home Exercises Provided: yes.  Exercises were reviewed and Melani was able to demonstrate them prior to the end of the session.  Melani demonstrated good  understanding of the HEP provided. See EMR under Patient Instructions for exercises provided during therapy sessions.      ASSESSMENT      Continues to demo decr scapula strength and stability with cues for proper technique of exercises.  Will progress as ward. Informed pt she may benefit from seeing ortho doctor to evaluate her back and neck since she is being affected bilaterally in her arms.     Melani is progressing well towards her goals and there are no updates to goals at this time. Pt prognosis is Fair.     Pt will continue to benefit from skilled outpatient occupational therapy to address the deficits listed in the problem list on initial evaluation, provide pt/family education and to maximize pt's level of independence in the home and community environment.     Pt's spiritual, cultural and educational needs considered and pt agreeable to plan of care and goals.    Anticipated barriers to occupational therapy: n/a     Goals:  Long Term Goals (LTGs); to be met by discharge.  Pt will demo improved  strength by 8 pounds   Pt report FOTO score of 41% limitation or below  Pt will report pain level no greater than 1-2/10 during functional daily work and community activities   Pt will demo improved pinch strength by 3 pounds      Short Term Goals (STGs); to be met within 4 weeks ().  Pt will demo improved  strength by 5 pounds   Pt will report improvement in FOTO score by decreasing limitation by 5-8% points.  Pt will report pain  level no greater than 2-3/10 during light activity   Pt will demo improved pinch strength by 2 pounds     PLAN     Continue skilled occupational therapy with individualized plan of care focusing on improving functional independence with use of RUE    Updates/Grading for next session: cont as ward     Denisha Humphrey, OT

## 2023-03-28 ENCOUNTER — CLINICAL SUPPORT (OUTPATIENT)
Dept: REHABILITATION | Facility: HOSPITAL | Age: 33
End: 2023-03-28
Payer: MEDICAID

## 2023-03-28 DIAGNOSIS — R29.898 DECREASED GRIP STRENGTH: ICD-10-CM

## 2023-03-28 DIAGNOSIS — R52 PAIN: Primary | ICD-10-CM

## 2023-03-28 PROCEDURE — 97530 THERAPEUTIC ACTIVITIES: CPT

## 2023-03-28 NOTE — PROGRESS NOTES
" [facility]  Occupational Therapy Treatment Note    Date: 3/28/2023  Name: Melani Sloan  Clinic Number: [id number]    Therapy Diagnosis:   Encounter Diagnoses   Name Primary?    Pain Yes    Decreased  strength        Physician: Satish Clark MD    Physician Orders: Eval and treat; ROM, ultrasound, massage, paraffin, slow progressive resistance exercises   Medical Diagnosis: M77.11 (ICD-10-CM) - Right lateral epicondylitis  Date of Injury/Onset: December 2022  Evaluation Date: 3/13/2023  Insurance Authorization Period Expiration: 03/09/2024  Plan of Care Expiration: 8 weeks; 5/12/2023  Date of Return to MD: 3/22/2023   Visit # / Visits authorized: 1 / 20  FOTO: (date and score)       Precautions:  Standard hx of seizure        Time In:    02:10 PM   Time Out: 03:00  PM  Total Appointment Time (timed & untimed codes): 50minutes    SUBJECTIVE     Pt reports: "got injection yesterday for her back"   She was compliant with home exercise program given last session.   Response to previous treatment: none noted   Functional change: less pain     Pain: 7/10 (R)   Location: right elbow    OBJECTIVE   Objective Measures updated at progress report unless specified.    Observation/Appearance: forward posture      Edema. Measured in centimeters.    3/15/2023 3/15/2023     Left Right   2in. Above elbow 24.5  24.5    2in. Below elbow 24.8  23.9   Wrist Crease       Figure of 8       MCPs          Bilateral shoulder flexion WNL   Bilateral shoulder abduction ~75% WNL      Elbow and Wrist ROM. Measured in degrees.    3/15/2023 3/15/2023     Left Right   Elbow Ext/Flex   WNL   Supination/Pronation   WNL   Wrist Ext/Flex 49/74 55/70   Wrist RD/UD 30/30 25/45      Hand ROM. Measured in degrees.    3/15/2023 3/15/2023     Left Right           Index: MP    Able to make full composite fist    Thumb: MP                    IP           Rad ADD/ABD           Pal ADD/ABD              Opposition   DPC     "    Strength (Dynamometer) and Pinch Strength (Pinch Gauge)  Measured in pounds. B    3/15/2023 3/15/2023     Left Right   Rung II; flexed     Extended 24        30 33         33   Scott Pinch 11 11.5   3pt Pinch 11 9   2pt Pinch          Sensation    3/13/2023 3/13/2023     Left Right   [place]       Normal 1.65-2.83       Diminished Light Touch 3.22-3.61       Diminished Protective 3.84-4.31       Loss of Protective 4.56-6.65       Untestable >6.65       2 Point Discrimination       Static       Dynamic          Manual Muscle Test    3/15/2023 3/15/2023     Left Right   Wrist Extension  3+ 3   Wrist Flexion       Radial Deviation       Ulnar Deviation       Supination       Pronation       Elbow Extension       Elbow Flexion          Special Tests  Thumb CMC Grind Test     Finkelstein's Test     Phalen's Test     Scratch Collapse Test     Tennis Elbow Test pos   Resisted Middle Finger Extension Test pos   Mills Test           Limitation/Restriction for FOTO initial eval;  Survey     Therapist reviewed FOTO scores for Melani Sloan on 3/13/2023.   FOTO documents entered into [employer] - see Media section.     Limitation Score: 61%            Treatment     Melani received the treatments listed below:     Supervised modalities after being cleared for contradictions: Hot Pack - 10 minutes       Direct contact modalities:   Patient received ultrasound to Lateral R elbow area to increase blood flow, circulation, tissue elasticity, and for pain management for 8 minutes @ 1 Mhz, Intensity 1.2 w/cm2 at 100% duty cycle.       Manual therapy techniques: Soft tissue Mobilization were applied to the: R extensors for 8 minutes, including:  - STM star tool extensors   - ktape for lateral epicondylitis  NT       Therapeutic activities to improve functional performance for 39 minutes, including:  - astym stretch holding for 30s x 3 reps   - eccentric wrist extension with 1# DB x 10 reps (2 sets)   - eccentric RD with 1# DB  x 10 reps (2 sets)   - jessica twist red flexbar x 10 reps   - oscillations x 2 min   - radial nerve glides   - wrist eccentric with bar and red theraband x 10 reps (2 sets)         Not performed today:   - prone rows (1#), extension, horizontal abduction, scaption x 10 reps   - scap AROM: elevation, retraction, rolls x 15 ea   - serratus anterior rolls on wall with foam roller x 10 reps (2 sets)   Patient Education and Home Exercises      Education provided:   - updated HEP with medbridge   - Progress towards goals     Written Home Exercises Provided: yes.  Exercises were reviewed and Melani was able to demonstrate them prior to the end of the session.  Melani demonstrated good  understanding of the HEP provided. See EMR under Patient Instructions for exercises provided during therapy sessions.      ASSESSMENT     Good ward to tx. Able to ward increased resistance.     Melani is progressing well towards her goals and there are no updates to goals at this time. Pt prognosis is Fair.     Pt will continue to benefit from skilled outpatient occupational therapy to address the deficits listed in the problem list on initial evaluation, provide pt/family education and to maximize pt's level of independence in the home and community environment.     Pt's spiritual, cultural and educational needs considered and pt agreeable to plan of care and goals.    Anticipated barriers to occupational therapy: n/a     Goals:  Long Term Goals (LTGs); to be met by discharge.  Pt will demo improved  strength by 8 pounds   Pt report FOTO score of 41% limitation or below  Pt will report pain level no greater than 1-2/10 during functional daily work and community activities   Pt will demo improved pinch strength by 3 pounds      Short Term Goals (STGs); to be met within 4 weeks ().  Pt will demo improved  strength by 5 pounds   Pt will report improvement in FOTO score by decreasing limitation by 5-8% points.  Pt will report pain level  no greater than 2-3/10 during light activity   Pt will demo improved pinch strength by 2 pounds     PLAN     Continue skilled occupational therapy with individualized plan of care focusing on improving functional independence with use of RUE    Updates/Grading for next session: cont as ward     Denisha Humphrey, OT

## 2023-03-29 ENCOUNTER — ANESTHESIA EVENT (OUTPATIENT)
Dept: SURGERY | Facility: HOSPITAL | Age: 33
End: 2023-03-29
Payer: MEDICAID

## 2023-03-31 ENCOUNTER — HOSPITAL ENCOUNTER (OUTPATIENT)
Facility: HOSPITAL | Age: 33
Discharge: HOME | End: 2023-03-31
Attending: NEUROLOGICAL SURGERY | Admitting: NEUROLOGICAL SURGERY
Payer: MEDICAID

## 2023-03-31 ENCOUNTER — ANESTHESIA (OUTPATIENT)
Dept: SURGERY | Facility: HOSPITAL | Age: 33
End: 2023-03-31
Payer: MEDICAID

## 2023-03-31 VITALS
BODY MASS INDEX: 20.99 KG/M2 | RESPIRATION RATE: 20 BRPM | OXYGEN SATURATION: 98 % | TEMPERATURE: 98 F | DIASTOLIC BLOOD PRESSURE: 58 MMHG | WEIGHT: 126 LBS | HEART RATE: 52 BPM | SYSTOLIC BLOOD PRESSURE: 101 MMHG | HEIGHT: 65 IN

## 2023-03-31 DIAGNOSIS — G40.919 INTRACTABLE EPILEPSY: ICD-10-CM

## 2023-03-31 LAB
ABO + RH BLD: NORMAL
ANION GAP SERPL CALC-SCNC: 9 MMOL/L (ref 8–16)
APTT PPP: 24.8 SEC (ref 21–32)
B-HCG UR QL: NEGATIVE
BASOPHILS # BLD AUTO: 0.03 K/UL (ref 0–0.2)
BASOPHILS NFR BLD: 0.3 % (ref 0–1.9)
BLD GP AB SCN CELLS X3 SERPL QL: NORMAL
BUN SERPL-MCNC: 15 MG/DL (ref 6–20)
CALCIUM SERPL-MCNC: 9.1 MG/DL (ref 8.7–10.5)
CHLORIDE SERPL-SCNC: 107 MMOL/L (ref 95–110)
CO2 SERPL-SCNC: 24 MMOL/L (ref 23–29)
CREAT SERPL-MCNC: 0.7 MG/DL (ref 0.5–1.4)
CTP QC/QA: YES
DIFFERENTIAL METHOD: ABNORMAL
EOSINOPHIL # BLD AUTO: 0.2 K/UL (ref 0–0.5)
EOSINOPHIL NFR BLD: 1.4 % (ref 0–8)
ERYTHROCYTE [DISTWIDTH] IN BLOOD BY AUTOMATED COUNT: 14.5 % (ref 11.5–14.5)
EST. GFR  (NO RACE VARIABLE): >60 ML/MIN/1.73 M^2
GLUCOSE SERPL-MCNC: 75 MG/DL (ref 70–110)
HCT VFR BLD AUTO: 46.1 % (ref 37–48.5)
HGB BLD-MCNC: 14.5 G/DL (ref 12–16)
IMM GRANULOCYTES # BLD AUTO: 0.07 K/UL (ref 0–0.04)
IMM GRANULOCYTES NFR BLD AUTO: 0.7 % (ref 0–0.5)
INR PPP: 1 (ref 0.8–1.2)
LYMPHOCYTES # BLD AUTO: 2.6 K/UL (ref 1–4.8)
LYMPHOCYTES NFR BLD: 24 % (ref 18–48)
MCH RBC QN AUTO: 28.3 PG (ref 27–31)
MCHC RBC AUTO-ENTMCNC: 31.5 G/DL (ref 32–36)
MCV RBC AUTO: 90 FL (ref 82–98)
MONOCYTES # BLD AUTO: 1.2 K/UL (ref 0.3–1)
MONOCYTES NFR BLD: 11.1 % (ref 4–15)
NEUTROPHILS # BLD AUTO: 6.7 K/UL (ref 1.8–7.7)
NEUTROPHILS NFR BLD: 62.5 % (ref 38–73)
NRBC BLD-RTO: 0 /100 WBC
PLATELET # BLD AUTO: 203 K/UL (ref 150–450)
PMV BLD AUTO: 12.8 FL (ref 9.2–12.9)
POTASSIUM SERPL-SCNC: 3.8 MMOL/L (ref 3.5–5.1)
PROTHROMBIN TIME: 10.5 SEC (ref 9–12.5)
RBC # BLD AUTO: 5.13 M/UL (ref 4–5.4)
SODIUM SERPL-SCNC: 140 MMOL/L (ref 136–145)
SPECIMEN OUTDATE: NORMAL
WBC # BLD AUTO: 10.67 K/UL (ref 3.9–12.7)

## 2023-03-31 PROCEDURE — 27201423 OPTIME MED/SURG SUP & DEVICES STERILE SUPPLY: Performed by: NEUROLOGICAL SURGERY

## 2023-03-31 PROCEDURE — 85025 COMPLETE CBC W/AUTO DIFF WBC: CPT | Performed by: STUDENT IN AN ORGANIZED HEALTH CARE EDUCATION/TRAINING PROGRAM

## 2023-03-31 PROCEDURE — D9220A PRA ANESTHESIA: ICD-10-PCS | Mod: CRNA,,, | Performed by: NURSE ANESTHETIST, CERTIFIED REGISTERED

## 2023-03-31 PROCEDURE — 25000003 PHARM REV CODE 250: Performed by: ANESTHESIOLOGY

## 2023-03-31 PROCEDURE — 63600175 PHARM REV CODE 636 W HCPCS: Performed by: NURSE ANESTHETIST, CERTIFIED REGISTERED

## 2023-03-31 PROCEDURE — 25000003 PHARM REV CODE 250: Performed by: NURSE ANESTHETIST, CERTIFIED REGISTERED

## 2023-03-31 PROCEDURE — 00400 ANES INTEGUMENTARY SYS NOS: CPT | Performed by: NEUROLOGICAL SURGERY

## 2023-03-31 PROCEDURE — D9220A PRA ANESTHESIA: ICD-10-PCS | Mod: ANES,,, | Performed by: ANESTHESIOLOGY

## 2023-03-31 PROCEDURE — 37000009 HC ANESTHESIA EA ADD 15 MINS: Performed by: NEUROLOGICAL SURGERY

## 2023-03-31 PROCEDURE — 25000003 PHARM REV CODE 250: Performed by: NEUROLOGICAL SURGERY

## 2023-03-31 PROCEDURE — 80048 BASIC METABOLIC PNL TOTAL CA: CPT | Performed by: STUDENT IN AN ORGANIZED HEALTH CARE EDUCATION/TRAINING PROGRAM

## 2023-03-31 PROCEDURE — 71000016 HC POSTOP RECOV ADDL HR: Performed by: NEUROLOGICAL SURGERY

## 2023-03-31 PROCEDURE — 86900 BLOOD TYPING SEROLOGIC ABO: CPT | Performed by: STUDENT IN AN ORGANIZED HEALTH CARE EDUCATION/TRAINING PROGRAM

## 2023-03-31 PROCEDURE — 36415 COLL VENOUS BLD VENIPUNCTURE: CPT | Performed by: STUDENT IN AN ORGANIZED HEALTH CARE EDUCATION/TRAINING PROGRAM

## 2023-03-31 PROCEDURE — D9220A PRA ANESTHESIA: Mod: CRNA,,, | Performed by: NURSE ANESTHETIST, CERTIFIED REGISTERED

## 2023-03-31 PROCEDURE — 71000015 HC POSTOP RECOV 1ST HR: Performed by: NEUROLOGICAL SURGERY

## 2023-03-31 PROCEDURE — 25000003 PHARM REV CODE 250

## 2023-03-31 PROCEDURE — 63600175 PHARM REV CODE 636 W HCPCS: Performed by: NEUROLOGICAL SURGERY

## 2023-03-31 PROCEDURE — 36000707: Performed by: NEUROLOGICAL SURGERY

## 2023-03-31 PROCEDURE — 36000706: Performed by: NEUROLOGICAL SURGERY

## 2023-03-31 PROCEDURE — D9220A PRA ANESTHESIA: Mod: ANES,,, | Performed by: ANESTHESIOLOGY

## 2023-03-31 PROCEDURE — 85610 PROTHROMBIN TIME: CPT | Performed by: STUDENT IN AN ORGANIZED HEALTH CARE EDUCATION/TRAINING PROGRAM

## 2023-03-31 PROCEDURE — C1767 GENERATOR, NEURO NON-RECHARG: HCPCS | Performed by: NEUROLOGICAL SURGERY

## 2023-03-31 PROCEDURE — 85730 THROMBOPLASTIN TIME PARTIAL: CPT | Performed by: STUDENT IN AN ORGANIZED HEALTH CARE EDUCATION/TRAINING PROGRAM

## 2023-03-31 PROCEDURE — 37000008 HC ANESTHESIA 1ST 15 MINUTES: Performed by: NEUROLOGICAL SURGERY

## 2023-03-31 PROCEDURE — 71000044 HC DOSC ROUTINE RECOVERY FIRST HOUR: Performed by: NEUROLOGICAL SURGERY

## 2023-03-31 PROCEDURE — 81025 URINE PREGNANCY TEST: CPT | Performed by: STUDENT IN AN ORGANIZED HEALTH CARE EDUCATION/TRAINING PROGRAM

## 2023-03-31 DEVICE — GENERATOR SENTIVA: Type: IMPLANTABLE DEVICE | Site: CHEST  WALL | Status: FUNCTIONAL

## 2023-03-31 RX ORDER — OXYCODONE AND ACETAMINOPHEN 5; 325 MG/1; MG/1
1 TABLET ORAL EVERY 4 HOURS PRN
Status: DISCONTINUED | OUTPATIENT
Start: 2023-03-31 | End: 2023-03-31 | Stop reason: HOSPADM

## 2023-03-31 RX ORDER — MIDAZOLAM HYDROCHLORIDE 1 MG/ML
INJECTION INTRAMUSCULAR; INTRAVENOUS
Status: DISCONTINUED | OUTPATIENT
Start: 2023-03-31 | End: 2023-03-31

## 2023-03-31 RX ORDER — ONDANSETRON 2 MG/ML
INJECTION INTRAMUSCULAR; INTRAVENOUS
Status: DISCONTINUED | OUTPATIENT
Start: 2023-03-31 | End: 2023-03-31

## 2023-03-31 RX ORDER — ONDANSETRON 2 MG/ML
8 INJECTION INTRAMUSCULAR; INTRAVENOUS EVERY 6 HOURS PRN
Status: DISCONTINUED | OUTPATIENT
Start: 2023-03-31 | End: 2023-03-31 | Stop reason: HOSPADM

## 2023-03-31 RX ORDER — VANCOMYCIN HYDROCHLORIDE 1 G/20ML
INJECTION, POWDER, LYOPHILIZED, FOR SOLUTION INTRAVENOUS
Status: DISCONTINUED | OUTPATIENT
Start: 2023-03-31 | End: 2023-03-31 | Stop reason: HOSPADM

## 2023-03-31 RX ORDER — FENTANYL CITRATE 50 UG/ML
INJECTION, SOLUTION INTRAMUSCULAR; INTRAVENOUS
Status: DISCONTINUED | OUTPATIENT
Start: 2023-03-31 | End: 2023-03-31

## 2023-03-31 RX ORDER — OXYCODONE AND ACETAMINOPHEN 5; 325 MG/1; MG/1
1 TABLET ORAL
Status: DISCONTINUED | OUTPATIENT
Start: 2023-03-31 | End: 2023-03-31 | Stop reason: HOSPADM

## 2023-03-31 RX ORDER — ACETAMINOPHEN 500 MG
1000 TABLET ORAL 3 TIMES DAILY
Status: DISCONTINUED | OUTPATIENT
Start: 2023-03-31 | End: 2023-03-31 | Stop reason: HOSPADM

## 2023-03-31 RX ORDER — HYDROCODONE BITARTRATE AND ACETAMINOPHEN 10; 325 MG/1; MG/1
1 TABLET ORAL EVERY 6 HOURS PRN
Qty: 30 TABLET | Refills: 0 | Status: SHIPPED | OUTPATIENT
Start: 2023-03-31

## 2023-03-31 RX ORDER — ONDANSETRON 2 MG/ML
4 INJECTION INTRAMUSCULAR; INTRAVENOUS DAILY PRN
Status: DISCONTINUED | OUTPATIENT
Start: 2023-03-31 | End: 2023-03-31 | Stop reason: HOSPADM

## 2023-03-31 RX ORDER — PROPOFOL 10 MG/ML
VIAL (ML) INTRAVENOUS
Status: DISCONTINUED | OUTPATIENT
Start: 2023-03-31 | End: 2023-03-31

## 2023-03-31 RX ORDER — MUPIROCIN 20 MG/G
OINTMENT TOPICAL 2 TIMES DAILY
Status: DISCONTINUED | OUTPATIENT
Start: 2023-03-31 | End: 2023-03-31 | Stop reason: HOSPADM

## 2023-03-31 RX ORDER — HALOPERIDOL 5 MG/ML
0.5 INJECTION INTRAMUSCULAR EVERY 10 MIN PRN
Status: DISCONTINUED | OUTPATIENT
Start: 2023-03-31 | End: 2023-03-31 | Stop reason: HOSPADM

## 2023-03-31 RX ORDER — MUPIROCIN 20 MG/G
1 OINTMENT TOPICAL 2 TIMES DAILY
Status: DISCONTINUED | OUTPATIENT
Start: 2023-03-31 | End: 2023-03-31

## 2023-03-31 RX ORDER — HYDROMORPHONE HYDROCHLORIDE 1 MG/ML
0.2 INJECTION, SOLUTION INTRAMUSCULAR; INTRAVENOUS; SUBCUTANEOUS EVERY 5 MIN PRN
Status: DISCONTINUED | OUTPATIENT
Start: 2023-03-31 | End: 2023-03-31 | Stop reason: HOSPADM

## 2023-03-31 RX ORDER — CEPHALEXIN 500 MG/1
500 CAPSULE ORAL EVERY 6 HOURS
Qty: 20 CAPSULE | Refills: 0 | Status: SHIPPED | OUTPATIENT
Start: 2023-03-31 | End: 2023-04-05

## 2023-03-31 RX ORDER — BUPIVACAINE HYDROCHLORIDE AND EPINEPHRINE 5; 5 MG/ML; UG/ML
INJECTION, SOLUTION EPIDURAL; INTRACAUDAL; PERINEURAL
Status: DISCONTINUED | OUTPATIENT
Start: 2023-03-31 | End: 2023-03-31 | Stop reason: HOSPADM

## 2023-03-31 RX ORDER — METHOCARBAMOL 750 MG/1
500 TABLET, FILM COATED ORAL DAILY PRN
COMMUNITY

## 2023-03-31 RX ORDER — LIDOCAINE HYDROCHLORIDE 10 MG/ML
INJECTION, SOLUTION EPIDURAL; INFILTRATION; INTRACAUDAL; PERINEURAL
Status: DISCONTINUED | OUTPATIENT
Start: 2023-03-31 | End: 2023-03-31

## 2023-03-31 RX ORDER — MUPIROCIN 20 MG/G
OINTMENT TOPICAL
Status: COMPLETED
Start: 2023-03-31 | End: 2023-03-31

## 2023-03-31 RX ORDER — DOXYCYCLINE 100 MG/1
100 CAPSULE ORAL DAILY
COMMUNITY

## 2023-03-31 RX ORDER — MUPIROCIN 20 MG/G
OINTMENT TOPICAL
Status: DISCONTINUED | OUTPATIENT
Start: 2023-03-31 | End: 2023-03-31

## 2023-03-31 RX ORDER — SODIUM CHLORIDE 0.9 % (FLUSH) 0.9 %
3 SYRINGE (ML) INJECTION
Status: DISCONTINUED | OUTPATIENT
Start: 2023-03-31 | End: 2023-03-31 | Stop reason: HOSPADM

## 2023-03-31 RX ORDER — CEFAZOLIN SODIUM 1 G/3ML
INJECTION, POWDER, FOR SOLUTION INTRAMUSCULAR; INTRAVENOUS
Status: DISCONTINUED | OUTPATIENT
Start: 2023-03-31 | End: 2023-03-31

## 2023-03-31 RX ORDER — CELECOXIB 200 MG/1
200 CAPSULE ORAL DAILY
COMMUNITY

## 2023-03-31 RX ORDER — SODIUM CHLORIDE 9 MG/ML
INJECTION, SOLUTION INTRAVENOUS CONTINUOUS
Status: DISCONTINUED | OUTPATIENT
Start: 2023-03-31 | End: 2023-03-31 | Stop reason: HOSPADM

## 2023-03-31 RX ORDER — DEXAMETHASONE SODIUM PHOSPHATE 4 MG/ML
INJECTION, SOLUTION INTRA-ARTICULAR; INTRALESIONAL; INTRAMUSCULAR; INTRAVENOUS; SOFT TISSUE
Status: DISCONTINUED | OUTPATIENT
Start: 2023-03-31 | End: 2023-03-31

## 2023-03-31 RX ADMIN — LIDOCAINE HYDROCHLORIDE 100 MG: 10 INJECTION, SOLUTION EPIDURAL; INFILTRATION; INTRACAUDAL; PERINEURAL at 10:03

## 2023-03-31 RX ADMIN — SODIUM CHLORIDE: 9 INJECTION, SOLUTION INTRAVENOUS at 10:03

## 2023-03-31 RX ADMIN — MUPIROCIN: 20 OINTMENT TOPICAL at 08:03

## 2023-03-31 RX ADMIN — PROPOFOL 160 MG: 10 INJECTION, EMULSION INTRAVENOUS at 10:03

## 2023-03-31 RX ADMIN — GLYCOPYRROLATE 0.2 MG: 0.2 INJECTION, SOLUTION INTRAMUSCULAR; INTRAVENOUS at 10:03

## 2023-03-31 RX ADMIN — MIDAZOLAM HYDROCHLORIDE 2 MG: 1 INJECTION INTRAMUSCULAR; INTRAVENOUS at 09:03

## 2023-03-31 RX ADMIN — FENTANYL CITRATE 50 MCG: 50 INJECTION, SOLUTION INTRAMUSCULAR; INTRAVENOUS at 10:03

## 2023-03-31 RX ADMIN — ONDANSETRON 4 MG: 2 INJECTION INTRAMUSCULAR; INTRAVENOUS at 10:03

## 2023-03-31 RX ADMIN — OXYCODONE HYDROCHLORIDE AND ACETAMINOPHEN 1 TABLET: 5; 325 TABLET ORAL at 11:03

## 2023-03-31 RX ADMIN — CEFAZOLIN 2 G: 330 INJECTION, POWDER, FOR SOLUTION INTRAMUSCULAR; INTRAVENOUS at 10:03

## 2023-03-31 RX ADMIN — DEXAMETHASONE SODIUM PHOSPHATE 4 MG: 4 INJECTION, SOLUTION INTRAMUSCULAR; INTRAVENOUS at 10:03

## 2023-03-31 NOTE — ANESTHESIA PROCEDURE NOTES
Intubation    Date/Time: 3/31/2023 10:02 AM  Performed by: Graham Claire CRNA  Authorized by: Gian Sprague Jr., MD     Intubation:     Induction:  Intravenous    Intubated:  Postinduction    Mask Ventilation:  Not attempted    Difficult Airway Encountered?: No      Airway Device:  Supraglottic airway/LMA    Airway Device Size:  3.5    Placement Verified By:  Capnometry    Findings Post-Intubation:  BS equal bilateral and atraumatic/condition of teeth unchanged

## 2023-03-31 NOTE — PATIENT INSTRUCTIONS
--Patient stable for discharge to home    --Please take prescriptions as detailed in medication list    --All questions/concerns addressed and answered    --Please followup with neurosurgery clinic in 2 weeks    --Please call immediately for any new onset nausea/vomiting/fever/chills, wound breakdown, numbness/tingling/weakness    Wound Care Instructions:  -If you have any dressings at discharge, please remove 48 hours after the surgery.    -You may shower daily but do not soak or submerge wound in water. Pat incision dry, do not rub.  -Keep all incisions clean, dry, and open to air.  -Do not apply creams or ointments to the wound.  -No driving while on narcotic pain medications  -If you were given a brace for spine surgery, please remove to shower, may remove while in bed, no driving, and must be worn for 6 weeks when out of bed.  -Call Neurosurgery if the wound opens, drains, or becomes red.  --no lifting >10 lbs until clinic follow up

## 2023-03-31 NOTE — OP NOTE
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  She presents with end of life generator and was referred for generator replacement.  We spoke at length with the patient her family regarding the risks and benefits alternatives to proceeding with surgical management.  Informed consent was obtained.      Procedure detail: Patient was taken to the operating room placed in a supine position.  An LMA was inserted she was given sedation.  She was prepped and draped in usual sterile fashion.  Her axillary incision was opened.  The pulse generator was identified and removed.  The electrode was removed from the old generator and placed into the new generator.  The connection was secured.  Generator was placed into the pocket.  Diagnostics was performed in everything was satisfactory.  Wound was copiously irrigated with normal saline.  Generator was tacked to the pectoralis fascia with 2-0 silk suture.  Incision was then closed in layers.      There were no apparent intraoperative complications incurred during this procedure.  I was present for this entire procedure.  Patient was transferred to the recovery area in stable condition.

## 2023-03-31 NOTE — BRIEF OP NOTE
[facility] - Surgery ([facility])  Brief Operative Note    SUMMARY     Surgery Date: 3/31/2023     Surgeon(s) and Role:     * Davey Montejo MD - Primary    Assisting Surgeon: Berry Hull MD    Pre-op Diagnosis:  Localization-related symptomatic epilepsy and epileptic syndromes with complex partial seizures, intractable, without status epilepticus [G40.219]    Post-op Diagnosis:  Post-Op Diagnosis Codes:     * Localization-related symptomatic epilepsy and epileptic syndromes with complex partial seizures, intractable, without status epilepticus [G40.219]    Procedure(s) (LRB):  REPLACEMENT, BATTERY, NEUROSTIMULATOR, VAGAL (N/A)    Anesthesia: General/MAC    Operative Findings: IPG replacement    Estimated Blood Loss: 25cc         Specimens:   Specimen (24h ago, onward)      None            [id number]

## 2023-03-31 NOTE — ANESTHESIA POSTPROCEDURE EVALUATION
Anesthesia Post Evaluation    Patient: Melani Sloan    Procedure(s) Performed: Procedure(s) (LRB):  REPLACEMENT, BATTERY, NEUROSTIMULATOR, VAGAL (N/A)    Final Anesthesia Type: general      Patient location during evaluation: PACU  Patient participation: Yes- Able to Participate  Level of consciousness: awake and alert and oriented  Post-procedure vital signs: reviewed and stable  Pain management: adequate  Airway patency: patent    PONV status at discharge: No PONV  Anesthetic complications: no      Cardiovascular status: stable  Respiratory status: unassisted, spontaneous ventilation and room air  Hydration status: euvolemic  Follow-up not needed.          Vitals Value Taken Time   BP 99/58 03/31/23 1217   Temp  03/31/23 1230   Pulse 53 03/31/23 1229   Resp 30 03/31/23 1229   SpO2 98 % 03/31/23 1229   Vitals shown include unvalidated device data.      No case tracking events are documented in the log.      Pain/Dandre Score: Pain Rating Prior to Med Admin: 4 (3/31/2023 11:27 AM)  Dandre Score: 10 (3/31/2023 10:50 AM)

## 2023-03-31 NOTE — H&P (VIEW-ONLY)
[facility] - Surgery ([facility])  Neurosurgery  History & Physical    Patient Name: Melani Sloan  MRN: [id number]  Admission Date: (Not on file)  Attending Physician: Davey Montejo MD   Primary Care Provider: Portia Cohen MD    Patient information was obtained from past medical records.     Subjective:     Chief Complaint/Reason for Admission: VNS Battery Change     History of Present Illness: Pt. Is a 32 year old woman who presents for VNS battery change. 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 Pt. Had VNS placed at age 16 which greatly reduced the seizures. Battery changed in 2010, had VNS replaced in 2017.     No medications prior to admission.       Review of patient's allergies indicates:   Allergen Reactions    Benadryl [diphenhydramine hcl]      Drug interactions       Past Medical History:   Diagnosis Date    Seizures      Past Surgical History:   Procedure Laterality Date    VAGUS NERVE STIMULATOR INSERTION       Family History    None       Tobacco Use    Smoking status: Every Day     Types: Vaping w/o nicotine    Smokeless tobacco: Not on file   Substance and Sexual Activity    Alcohol use: No    Drug use: Yes     Types: Marijuana    Sexual activity: Not on file     Review of Systems  All pertinent positive and negative as stated in above HPI. A full review of systems was performed and otherwise negative.   Objective:        There is no height or weight on file to calculate BMI.  Vital Signs (Most Recent):    Vital Signs (24h Range):  BP: ()/()   Arterial Line BP: ()/()                               Neurosurgery Physical Exam  On exam pt. Is a WDWN 32 year old female sitting up in chair in NAD. She is awake, alert & oriented. Speech is fluent and appropriate. CN II-XII are grossly intact. EOMI, face symmetric, tongue midline. Moves all four extremities on command. With appropriate strength. Sensation to light touch is  grossly intact throughout. Previous surgical incisions are clean, dry and intact.     Significant Labs:  No results for input(s): GLU, NA, K, CL, CO2, BUN, CREATININE, CALCIUM, MG in the last 48 hours.  No results for input(s): WBC, HGB, HCT, PLT in the last 48 hours.  No results for input(s): LABPT, INR, APTT in the last 48 hours.  Microbiology Results (last 7 days)       ** No results found for the last 168 hours. **          All pertinent labs from the last 24 hours have been reviewed.    Significant Diagnostics:  I have reviewed all pertinent imaging results/findings within the past 24 hours.    Assessment/Plan:     Localization-related symptomatic epilepsy and epileptic syndromes with complex partial seizures, intractable, without status epilepticus     Pt. Is a 32 year old woman who has had seizures since age 5, with a history of congenital toxoplasmosis, who is here for VNS battery change. Pt. Current battery is at 11-25%. We discussed the risks and benefits of procedure. Pt. Elected to proceed. All questions were answered.       Davey Montejo MD  Neurosurgery  [facility] - Surgery (2nd Fl)

## 2023-03-31 NOTE — DISCHARGE SUMMARY
[facility] - Surgery ([facility])  Discharge Note  Short Stay    Procedure(s) (LRB):  REPLACEMENT, BATTERY, NEUROSTIMULATOR, VAGAL (N/A)      OUTCOME: Patient was admitted for IPG replacement for battery endo of life on 03/31/23 and underwent surgery without perioperative complications. The patient remained on abx per surgical protocol until discharge. At the time of discharge, the patient was tolerating PO intake without N/V, dysphagia, denied bowel or bladder dysfunction, denied new neurological symptoms, and reported pain controlled with current regimen. The patient was accompanied by family and woke from anesthesia at baseline neuro-status. The surgical site was without evidence of drainage or dehisence and all sutures were intact. The patient will follow up in clinic as indicated in discharge instructions. All questions were answered and continued treatment/woundcare instructions were discussed in detail prior to discharge.    DISPOSITION: Home or Self Care    FINAL DIAGNOSIS:  <principal problem not specified>    FOLLOWUP: In clinic    DISCHARGE INSTRUCTIONS:  See patient chart

## 2023-03-31 NOTE — ANESTHESIA PREPROCEDURE EVALUATION
03/31/2023  Melani Sloan is a 33 y.o., female.      Pre-operative evaluation for Procedure(s) (LRB):  REPLACEMENT, BATTERY, NEUROSTIMULATOR, VAGAL (N/A)    @[id number]@@    Encounter Diagnosis   Name Primary?    Intractable epilepsy        Review of patient's allergies indicates:   Allergen Reactions    Benadryl [diphenhydramine hcl]      Drug interactions, patient states no longer allergic to benadryl off med that caused reaction w benadryl       Medications Prior to Admission   Medication Sig Dispense Refill Last Dose    brivaracetam (BRIVIACT) 50 mg Tab Take 50 mg by mouth every morning.   3/30/2023    brivaracetam (BRIVIACT) 50 mg Tab Take 50 mg by mouth nightly.   3/30/2023    celecoxib (CELEBREX) 200 MG capsule Take 200 mg by mouth once daily.   3/30/2023    doxycycline (MONODOX) 100 MG capsule Take 100 mg by mouth once daily.   3/30/2023    hydrocodone-acetaminophen 10-325mg (NORCO)  mg Tab Take 1 tablet by mouth every 6 (six) hours as needed for Pain (severe pain (7 or more out of 10).). 15 tablet 0 3/30/2023    methocarbamoL (ROBAXIN) 750 MG Tab Take 500 mg by mouth daily as needed.   3/30/2023    naproxen (NAPROSYN) 250 MG tablet Take 250 mg by mouth every 4 to 6 hours as needed.   Unknown         sodium chloride 0.9%         No current facility-administered medications on file prior to encounter.     Current Outpatient Medications on File Prior to Encounter   Medication Sig Dispense Refill    brivaracetam (BRIVIACT) 50 mg Tab Take 50 mg by mouth every morning.      brivaracetam (BRIVIACT) 50 mg Tab Take 50 mg by mouth nightly.      celecoxib (CELEBREX) 200 MG capsule Take 200 mg by mouth once daily.      doxycycline (MONODOX) 100 MG capsule Take 100 mg by mouth once daily.      hydrocodone-acetaminophen 10-325mg (NORCO)  mg Tab Take 1 tablet by mouth every 6 (six)  hours as needed for Pain (severe pain (7 or more out of 10).). 15 tablet 0    methocarbamoL (ROBAXIN) 750 MG Tab Take 500 mg by mouth daily as needed.      naproxen (NAPROSYN) 250 MG tablet Take 250 mg by mouth every 4 to 6 hours as needed.         Past Medical History:  No date: Seizures    Past Surgical History:   Procedure Laterality Date    VAGUS NERVE STIMULATOR INSERTION         Social History     Tobacco Use   Smoking Status Every Day    Types: Vaping w/o nicotine   Smokeless Tobacco Not on file       Social History     Substance and Sexual Activity   Alcohol Use No       Physical Activity: Not on file         No results for input(s): HCT in the last 72 hours.  No results for input(s): PLT in the last 72 hours.  No results for input(s): K in the last 72 hours.  No results for input(s): CREATININE in the last 72 hours.  No results for input(s): GLU in the last 72 hours.  No results for input(s): PT in the last 72 hours.                      Pre-op Assessment          Review of Systems  Anesthesia Hx:  No problems with previous Anesthesia    Hematology/Oncology:  Hematology Normal   Oncology Normal     Cardiovascular:  Cardiovascular Normal     Pulmonary:   Denies COPD. Asthma mild  Denies Shortness of breath. No routine asthma meds, inhaler about 5 times a year.   Renal/:   Denies Chronic Renal Disease.     Hepatic/GI:   Denies Liver Disease.    Neurological:   Denies TIA. Denies CVA. Denies Seizures.    Endocrine:   Denies Diabetes.        Physical Exam  General: Well nourished, Cooperative, Alert and Oriented    Airway:  Mallampati: II   Mouth Opening: Normal  TM Distance: Normal  Tongue: Normal  Neck ROM: Normal ROM        Anesthesia Plan  Type of Anesthesia, risks & benefits discussed:    Anesthesia Type: Gen ETT  Intra-op Monitoring Plan: Standard ASA Monitors  Post Op Pain Control Plan: multimodal analgesia and IV/PO Opioids PRN  Induction:  IV  Informed Consent: Informed consent signed with the  Patient and all parties understand the risks and agree with anesthesia plan.  All questions answered.   ASA Score: 1  Day of Surgery Review of History & Physical: H&P Update referred to the surgeon/provider.    Ready For Surgery From Anesthesia Perspective.     .  Present Prior to Hospital Arrival?: No; Placement Date: 12/29/17; Placement Time: 0952; Method of Intubation: Direct laryngoscopy; Inserted by: Anesthesia Resident; Airway Device: Endotracheal Tube; Mask Ventilation: Easy; Intubated: Postinduction; Blade: Healy #2; Airway Device Size: 7.0; Style: Cuffed; Cuff Inflation: Minimal occlusive pressure; Placement Verified By: Auscultation, Capnometry, ETT Condensation; Grade: Grade I; Complicating Factors: None; Intubation Findings: Positive EtCO2, Bilateral breath sounds, Atraumatic/Condition of teeth unchanged;  Depth of Insertion: 22; Securment: Lips; Complications: None; Breath Sounds: Equal Bilateral; Insertion Attempts: 1; Removal Date: 12/29/17;  Removal Time: 1127

## 2023-03-31 NOTE — H&P
[facility] - Surgery ([facility])  Neurosurgery  History & Physical    Patient Name: Melani Sloan  MRN: [id number]  Admission Date: (Not on file)  Attending Physician: Davey Montejo MD   Primary Care Provider: Portia Cohen MD    Patient information was obtained from past medical records.     Subjective:     Chief Complaint/Reason for Admission: VNS Battery Change     History of Present Illness: Pt. Is a 32 year old woman who presents for VNS battery change. 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 Pt. Had VNS placed at age 16 which greatly reduced the seizures. Battery changed in 2010, had VNS replaced in 2017.     No medications prior to admission.       Review of patient's allergies indicates:   Allergen Reactions    Benadryl [diphenhydramine hcl]      Drug interactions       Past Medical History:   Diagnosis Date    Seizures      Past Surgical History:   Procedure Laterality Date    VAGUS NERVE STIMULATOR INSERTION       Family History    None       Tobacco Use    Smoking status: Every Day     Types: Vaping w/o nicotine    Smokeless tobacco: Not on file   Substance and Sexual Activity    Alcohol use: No    Drug use: Yes     Types: Marijuana    Sexual activity: Not on file     Review of Systems  All pertinent positive and negative as stated in above HPI. A full review of systems was performed and otherwise negative.   Objective:        There is no height or weight on file to calculate BMI.  Vital Signs (Most Recent):    Vital Signs (24h Range):  BP: ()/()   Arterial Line BP: ()/()                               Neurosurgery Physical Exam  On exam pt. Is a WDWN 32 year old female sitting up in chair in NAD. She is awake, alert & oriented. Speech is fluent and appropriate. CN II-XII are grossly intact. EOMI, face symmetric, tongue midline. Moves all four extremities on command. With appropriate strength. Sensation to light touch is  grossly intact throughout. Previous surgical incisions are clean, dry and intact.     Significant Labs:  No results for input(s): GLU, NA, K, CL, CO2, BUN, CREATININE, CALCIUM, MG in the last 48 hours.  No results for input(s): WBC, HGB, HCT, PLT in the last 48 hours.  No results for input(s): LABPT, INR, APTT in the last 48 hours.  Microbiology Results (last 7 days)       ** No results found for the last 168 hours. **          All pertinent labs from the last 24 hours have been reviewed.    Significant Diagnostics:  I have reviewed all pertinent imaging results/findings within the past 24 hours.    Assessment/Plan:     Localization-related symptomatic epilepsy and epileptic syndromes with complex partial seizures, intractable, without status epilepticus     Pt. Is a 32 year old woman who has had seizures since age 5, with a history of congenital toxoplasmosis, who is here for VNS battery change. Pt. Current battery is at 11-25%. We discussed the risks and benefits of procedure. Pt. Elected to proceed. All questions were answered.       Davey Montejo MD  Neurosurgery  [facility] - Surgery (2nd Fl)

## 2023-04-03 ENCOUNTER — CLINICAL SUPPORT (OUTPATIENT)
Dept: REHABILITATION | Facility: HOSPITAL | Age: 33
End: 2023-04-03
Payer: MEDICAID

## 2023-04-03 DIAGNOSIS — R52 PAIN: Primary | ICD-10-CM

## 2023-04-03 DIAGNOSIS — R29.898 DECREASED GRIP STRENGTH: ICD-10-CM

## 2023-04-03 PROCEDURE — 97530 THERAPEUTIC ACTIVITIES: CPT | Mod: CO

## 2023-04-03 NOTE — PROGRESS NOTES
[facility]  Occupational Therapy Treatment Note    Date: 4/3/2023  Name: Melani Sloan  Clinic Number: [id number]    Therapy Diagnosis:   Encounter Diagnoses   Name Primary?    Pain Yes    Decreased  strength          Physician: Satish Clark MD    Physician Orders: Eval and treat; ROM, ultrasound, massage, paraffin, slow progressive resistance exercises   Medical Diagnosis: M77.11 (ICD-10-CM) - Right lateral epicondylitis  Date of Injury/Onset: December 2022  Evaluation Date: 3/13/2023  Insurance Authorization Period Expiration: 03/09/2024  Plan of Care Expiration: 8 weeks; 5/12/2023  Date of Return to MD: 3/22/2023   Visit # / Visits authorized: 5 / 20  FOTO: (date and score)       Precautions:  Standard hx of seizure        Time In:    11:56 AM   Time Out: 1:04 PM  Total Appointment Time (timed & untimed codes): 68 minutes    SUBJECTIVE     Pt reports: reported her R hand is feeling numb near the tips of her 3-5 digits, also waiting on referral from MD for L arm  She was compliant with home exercise program given last session.   Response to previous treatment: none noted   Functional change: less pain     Pain: 7/10 (R)   Location: right elbow    OBJECTIVE   Objective Measures updated at progress report unless specified.    Observation/Appearance: forward posture      Edema. Measured in centimeters.    3/15/2023 3/15/2023     Left Right   2in. Above elbow 24.5  24.5    2in. Below elbow 24.8  23.9   Wrist Crease       Figure of 8       MCPs          Bilateral shoulder flexion WNL   Bilateral shoulder abduction ~75% WNL      Elbow and Wrist ROM. Measured in degrees.    3/15/2023 3/15/2023     Left Right   Elbow Ext/Flex   WNL   Supination/Pronation   WNL   Wrist Ext/Flex 49/74 55/70   Wrist RD/UD 30/30 25/45      Hand ROM. Measured in degrees.    3/15/2023 3/15/2023     Left Right           Index: MP    Able to make full composite fist    Thumb: MP                    IP            Rad ADD/ABD           Pal ADD/ABD              Opposition   DPC        Strength (Dynamometer) and Pinch Strength (Pinch Gauge)  Measured in pounds. B    3/15/2023 3/15/2023     Left Right   Rung II; flexed     Extended 24        30 33         33   Scott Pinch 11 11.5   3pt Pinch 11 9   2pt Pinch          Sensation    3/13/2023 3/13/2023     Left Right   [place]       Normal 1.65-2.83       Diminished Light Touch 3.22-3.61       Diminished Protective 3.84-4.31       Loss of Protective 4.56-6.65       Untestable >6.65       2 Point Discrimination       Static       Dynamic          Manual Muscle Test    3/15/2023 3/15/2023     Left Right   Wrist Extension  3+ 3   Wrist Flexion       Radial Deviation       Ulnar Deviation       Supination       Pronation       Elbow Extension       Elbow Flexion          Special Tests  Thumb CMC Grind Test     Finkelstein's Test     Phalen's Test     Scratch Collapse Test     Tennis Elbow Test pos   Resisted Middle Finger Extension Test pos   Mills Test           Limitation/Restriction for FOTO initial eval;  Survey     Therapist reviewed FOTO scores for Melani Sloan on 3/13/2023.   FOTO documents entered into [employer] - see Media section.     Limitation Score: 61%            Treatment     Melani received the treatments listed below:     Supervised modalities after being cleared for contradictions: Hot Pack - 8 minutes     Patient received cold pack x 5 minutes to decrease pain/inflammation and edema following treatment session.     Therapeutic activities to improve functional performance for 55 minutes, including:    Direct contact modalities:   Patient received ultrasound to Lateral R elbow area to increase blood flow, circulation, tissue elasticity, and for pain management for 8 minutes @ 1 Mhz, Intensity 1.2 w/cm2 at 100% duty cycle.       Manual therapy techniques: Soft tissue Mobilization were applied   - STM Briana tool extensors   - light cupping along wrist extensors  and lateral epicondyle       - astym stretch holding for 30s x 3 reps (flex/ext only)  - eccentric wrist extension with 1# DB x 10 reps (2 sets)   - eccentric RD with 1# DB x 10 reps (2 sets)   - jessica twist red flexbar x 10 reps (2 sets)  - oscillations x 2 min   - radial nerve glides   - wrist eccentric with bar and red theraband x 10 reps (2 sets)   - pec stretch R on wall  - scap AROM: elevation, retraction, rolls x 15 ea       Not performed today:   - prone rows (1#), extension, horizontal abduction, scaption x 10 reps     - serratus anterior rolls on wall with foam roller x 10 reps (2 sets)   Patient Education and Home Exercises      Education provided:   - updated HEP with thang   - Progress towards goals     Written Home Exercises Provided: yes.  Exercises were reviewed and Melani was able to demonstrate them prior to the end of the session.  Melani demonstrated good  understanding of the HEP provided. See EMR under Patient Instructions for exercises provided during therapy sessions.      ASSESSMENT     Pt tolerated session well. C/o tightness along wrist extensors and numbness at the tips of her digits 3-5. Also c/o tightness in her shoulders. Cues for postural awareness during exercises. Will continue as tolerated.    Melani is progressing well towards her goals and there are no updates to goals at this time. Pt prognosis is Fair.     Pt will continue to benefit from skilled outpatient occupational therapy to address the deficits listed in the problem list on initial evaluation, provide pt/family education and to maximize pt's level of independence in the home and community environment.     Pt's spiritual, cultural and educational needs considered and pt agreeable to plan of care and goals.    Anticipated barriers to occupational therapy: n/a     Goals:  Long Term Goals (LTGs); to be met by discharge.  Pt will demo improved  strength by 8 pounds   Pt report FOTO score of 41% limitation or  below  Pt will report pain level no greater than 1-2/10 during functional daily work and community activities   Pt will demo improved pinch strength by 3 pounds      Short Term Goals (STGs); to be met within 4 weeks ().  Pt will demo improved  strength by 5 pounds   Pt will report improvement in FOTO score by decreasing limitation by 5-8% points.  Pt will report pain level no greater than 2-3/10 during light activity   Pt will demo improved pinch strength by 2 pounds     PLAN     Continue skilled occupational therapy with individualized plan of care focusing on improving functional independence with use of RUE    Updates/Grading for next session: cont as CHINMAY Lewis/KIRSTIN

## 2023-04-04 ENCOUNTER — CLINICAL SUPPORT (OUTPATIENT)
Dept: REHABILITATION | Facility: HOSPITAL | Age: 33
End: 2023-04-04
Payer: MEDICAID

## 2023-04-04 DIAGNOSIS — R29.898 DECREASED GRIP STRENGTH: ICD-10-CM

## 2023-04-04 DIAGNOSIS — R52 PAIN: Primary | ICD-10-CM

## 2023-04-04 PROCEDURE — 97530 THERAPEUTIC ACTIVITIES: CPT

## 2023-04-04 NOTE — PROGRESS NOTES
" [facility]  Occupational Therapy Treatment Note    Date: 4/4/2023  Name: Melani Sloan  Clinic Number: [id number]    Therapy Diagnosis:   Encounter Diagnoses   Name Primary?    Pain Yes    Decreased  strength          Physician: Satish Clark MD    Physician Orders: Eval and treat; ROM, ultrasound, massage, paraffin, slow progressive resistance exercises   Medical Diagnosis: M77.11 (ICD-10-CM) - Right lateral epicondylitis  Date of Injury/Onset: December 2022  Evaluation Date: 3/13/2023  Insurance Authorization Period Expiration: 03/09/2024  Plan of Care Expiration: 8 weeks; 5/12/2023  Date of Return to MD: 3/22/2023   Visit # / Visits authorized: 6 / 20  FOTO: (date and score)       Precautions:  Standard hx of seizure        Time In:    10:00 AM   Time Out:  11:00 AM  Total Appointment Time (timed & untimed codes): 60 minutes    SUBJECTIVE     Pt reports: "numbness in digits 3-5"   She was compliant with home exercise program given last session.   Response to previous treatment: none noted   Functional change: less pain     Pain:  5/10 (R)   Location: right elbow    OBJECTIVE   Objective Measures updated at progress report unless specified.    Observation/Appearance: forward posture      Edema. Measured in centimeters.    3/15/2023 3/15/2023     Left Right   2in. Above elbow 24.5  24.5    2in. Below elbow 24.8  23.9   Wrist Crease       Figure of 8       MCPs          Bilateral shoulder flexion WNL   Bilateral shoulder abduction ~75% WNL      Elbow and Wrist ROM. Measured in degrees.    3/15/2023 3/15/2023     Left Right   Elbow Ext/Flex   WNL   Supination/Pronation   WNL   Wrist Ext/Flex 49/74 55/70   Wrist RD/UD 30/30 25/45      Hand ROM. Measured in degrees.    3/15/2023 3/15/2023     Left Right           Index: MP    Able to make full composite fist    Thumb: MP                    IP           Rad ADD/ABD           Pal ADD/ABD              Opposition   DPC        " Strength (Dynamometer) and Pinch Strength (Pinch Gauge)  Measured in pounds. B    3/15/2023 3/15/2023     Left Right   Rung II; flexed     Extended 24        30 33         33   Scott Pinch 11 11.5   3pt Pinch 11 9   2pt Pinch          Sensation    3/13/2023 3/13/2023     Left Right   [place] Jose       Normal 1.65-2.83       Diminished Light Touch 3.22-3.61       Diminished Protective 3.84-4.31       Loss of Protective 4.56-6.65       Untestable >6.65       2 Point Discrimination       Static       Dynamic          Manual Muscle Test    3/15/2023 3/15/2023     Left Right   Wrist Extension  3+ 3   Wrist Flexion       Radial Deviation       Ulnar Deviation       Supination       Pronation       Elbow Extension       Elbow Flexion          Special Tests  Thumb CMC Grind Test     Finkelstein's Test     Phalen's Test     Scratch Collapse Test     Tennis Elbow Test pos   Resisted Middle Finger Extension Test pos   Mills Test           Limitation/Restriction for FOTO initial eval;  Survey     Therapist reviewed FOTO scores for Melani Sloan on 3/13/2023.   FOTO documents entered into [employer] - see Media section.     Limitation Score: 61%            Treatment     Melani received the treatments listed below:     Supervised modalities after being cleared for contradictions: Hot Pack - 8 minutes       Therapeutic activities to improve functional performance for 55 minutes, including:        Direct contact modalities:   Patient received ultrasound to Lateral R elbow area to increase blood flow, circulation, tissue elasticity, and for pain management for 8 minutes @ 1 Mhz, Intensity 1.2 w/cm2 at 100% duty cycle.         Manual therapy techniques: Soft tissue Mobilization were applied   - [facility] lala tool extensors       - prone rows, extension, horizontal abduction, scaption x 10 reps   - astym stretch holding for 30s x 3 reps (flex/ext only)  - eccentric wrist extension with 1# DB x 10 reps (2 sets)   - jessica twist red flexbar x  10 reps (2 sets)  - radial nerve glides passive x 10 reps   - scap AROM: elevation, retraction, rolls x 15 ea       KT tape postural correction + radial nerve distribution     Not performed today:   - serratus anterior rolls on wall with foam roller x 10 reps (2 sets)   Patient Education and Home Exercises      Education provided:   - updated HEP with thang   - Progress towards goals     Written Home Exercises Provided: yes.  Exercises were reviewed and Melani was able to demonstrate them prior to the end of the session.  Melani demonstrated good  understanding of the HEP provided. See EMR under Patient Instructions for exercises provided during therapy sessions.      ASSESSMENT     Pt tolerated session well. Demonstrated improved postural control with shoulder ROM. Educated on HEP and pt verbalized understanding.     Will continue as tolerated.    Melani is progressing well towards her goals and there are no updates to goals at this time. Pt prognosis is Fair.     Pt will continue to benefit from skilled outpatient occupational therapy to address the deficits listed in the problem list on initial evaluation, provide pt/family education and to maximize pt's level of independence in the home and community environment.     Pt's spiritual, cultural and educational needs considered and pt agreeable to plan of care and goals.    Anticipated barriers to occupational therapy: n/a     Goals:  Long Term Goals (LTGs); to be met by discharge.  Pt will demo improved  strength by 8 pounds   Pt report FOTO score of 41% limitation or below  Pt will report pain level no greater than 1-2/10 during functional daily work and community activities   Pt will demo improved pinch strength by 3 pounds      Short Term Goals (STGs); to be met within 4 weeks ().  Pt will demo improved  strength by 5 pounds   Pt will report improvement in FOTO score by decreasing limitation by 5-8% points.  Pt will report pain level no greater  than 2-3/10 during light activity   Pt will demo improved pinch strength by 2 pounds     PLAN     Continue skilled occupational therapy with individualized plan of care focusing on improving functional independence with use of RUE    Updates/Grading for next session: cont as ward     Denisha Humphrey, OT

## 2023-04-06 ENCOUNTER — CLINICAL SUPPORT (OUTPATIENT)
Dept: REHABILITATION | Facility: HOSPITAL | Age: 33
End: 2023-04-06
Payer: MEDICAID

## 2023-04-06 DIAGNOSIS — R29.898 DECREASED GRIP STRENGTH: ICD-10-CM

## 2023-04-06 DIAGNOSIS — R52 PAIN: Primary | ICD-10-CM

## 2023-04-06 PROCEDURE — 97530 THERAPEUTIC ACTIVITIES: CPT

## 2023-04-06 NOTE — PROGRESS NOTES
"  [facility]  Occupational Therapy Treatment Note    Date: 4/6/2023  Name: Melani Sloan  Clinic Number: [id number]    Therapy Diagnosis:   Encounter Diagnoses   Name Primary?    Pain Yes    Decreased  strength          Physician: Satish Clark MD    Physician Orders: Eval and treat; ROM, ultrasound, massage, paraffin, slow progressive resistance exercises   Medical Diagnosis: M77.11 (ICD-10-CM) - Right lateral epicondylitis  Date of Injury/Onset: December 2022  Evaluation Date: 3/13/2023  Insurance Authorization Period Expiration: 03/09/2024  Plan of Care Expiration: 8 weeks; 5/12/2023  Date of Return to MD: 3/22/2023   Visit # / Visits authorized: 7 / 20  FOTO: (date and score)       Precautions:  Standard hx of seizure        Time In:    10:00 AM   Time Out:  11:00 AM  Total Appointment Time (timed & untimed codes): 60 minutes    SUBJECTIVE     Pt reports: "I woke up this morning and this finger was feeling really stiff (LF) and the back of my shoulder"   She was compliant with home exercise program given last session.   Response to previous treatment: none noted   Functional change: less pain     Pain:  6/10  R elbow   Location: right elbow    OBJECTIVE   Objective Measures updated at progress report unless specified.    Observation/Appearance: forward posture      Edema. Measured in centimeters.    3/15/2023 3/15/2023     Left Right   2in. Above elbow 24.5  24.5    2in. Below elbow 24.8  23.9   Wrist Crease       Figure of 8       MCPs          Bilateral shoulder flexion WNL   Bilateral shoulder abduction ~75% WNL      Elbow and Wrist ROM. Measured in degrees.    3/15/2023 3/15/2023     Left Right   Elbow Ext/Flex   WNL   Supination/Pronation   WNL   Wrist Ext/Flex 49/74 55/70   Wrist RD/UD 30/30 25/45      Hand ROM. Measured in degrees.    3/15/2023 3/15/2023     Left Right           Index: MP    Able to make full composite fist    Thumb: MP                    IP           " Rad ADD/ABD           Pal ADD/ABD              Opposition   DPC        Strength (Dynamometer) and Pinch Strength (Pinch Gauge)  Measured in pounds. B    3/15/2023 3/15/2023     Left Right   Rung II; flexed     Extended 24        30 33         33   Scott Pinch 11 11.5   3pt Pinch 11 9   2pt Pinch          Sensation    3/13/2023 3/13/2023     Left Right   [place] Jose       Normal 1.65-2.83       Diminished Light Touch 3.22-3.61       Diminished Protective 3.84-4.31       Loss of Protective 4.56-6.65       Untestable >6.65       2 Point Discrimination       Static       Dynamic          Manual Muscle Test    3/15/2023 3/15/2023     Left Right   Wrist Extension  3+ 3   Wrist Flexion       Radial Deviation       Ulnar Deviation       Supination       Pronation       Elbow Extension       Elbow Flexion          Special Tests  Thumb CMC Grind Test     Finkelstein's Test     Phalen's Test     Scratch Collapse Test     Tennis Elbow Test pos   Resisted Middle Finger Extension Test pos   Mills Test           Limitation/Restriction for FOTO initial eval;  Survey     Therapist reviewed FOTO scores for Melani Sloan on 3/13/2023.   FOTO documents entered into [employer] - see Media section.     Limitation Score: 61%            Treatment     Melani received the treatments listed below:     Supervised modalities after being cleared for contradictions: Hot Pack - 10 minutes       Therapeutic activities to improve functional performance for 55 minutes, including:      Direct contact modalities: NT   Patient received ultrasound to Lateral R elbow area to increase blood flow, circulation, tissue elasticity, and for pain management for 8 minutes @ 1 Mhz, Intensity 1.2 w/cm2 at 100% duty cycle.       Therapeutic activities : 60 min  - [facility] lala tool extensors   - prone rows, extension, horizontal abduction, scaption x 10 reps   - astym stretch holding for 30s x 3 reps (flex/ext only)  - eccentric wrist extension with 1# DB x 10 reps  (2 sets) NT   - back against wall, B scaption x 10 reps (2 sets)   - jessica twist red flexbar x 10 reps (2 sets)  - radial nerve glides passive x 10 reps   - scap AROM: elevation, retraction, rolls x 15 ea       KT tape postural correction + radial nerve distribution     Not performed today:   - serratus anterior rolls on wall with foam roller x 10 reps (2 sets)   Patient Education and Home Exercises      Education provided:   - updated HEP with [employer]   - Progress towards goals     Written Home Exercises Provided: yes.  Exercises were reviewed and Melani was able to demonstrate them prior to the end of the session.  Melani demonstrated good  understanding of the HEP provided. See EMR under Patient Instructions for exercises provided during therapy sessions.      ASSESSMENT     Required mod to max verbal and tactile cue for proper scapular mechanics. Pt may exhibit scapular dyskinesia, renetta with B side involvement. Educated on HEP and pt verbalized understanding.     Will continue as tolerated.    Melani is progressing well towards her goals and there are no updates to goals at this time. Pt prognosis is Fair.     Pt will continue to benefit from skilled outpatient occupational therapy to address the deficits listed in the problem list on initial evaluation, provide pt/family education and to maximize pt's level of independence in the home and community environment.     Pt's spiritual, cultural and educational needs considered and pt agreeable to plan of care and goals.    Anticipated barriers to occupational therapy: n/a     Goals:  Long Term Goals (LTGs); to be met by discharge.  Pt will demo improved  strength by 8 pounds   Pt report FOTO score of 41% limitation or below  Pt will report pain level no greater than 1-2/10 during functional daily work and community activities   Pt will demo improved pinch strength by 3 pounds      Short Term Goals (STGs); to be met within 4 weeks ().  Pt will demo improved   strength by 5 pounds   Pt will report improvement in FOTO score by decreasing limitation by 5-8% points.  Pt will report pain level no greater than 2-3/10 during light activity   Pt will demo improved pinch strength by 2 pounds     PLAN     Continue skilled occupational therapy with individualized plan of care focusing on improving functional independence with use of RUE    Updates/Grading for next session: cont as ward     Denisha Humphrey, OT

## 2023-04-11 ENCOUNTER — CLINICAL SUPPORT (OUTPATIENT)
Dept: REHABILITATION | Facility: HOSPITAL | Age: 33
End: 2023-04-11
Payer: MEDICAID

## 2023-04-11 DIAGNOSIS — M25.522 LEFT ELBOW PAIN: ICD-10-CM

## 2023-04-11 DIAGNOSIS — M25.522 LEFT ELBOW PAIN: Primary | ICD-10-CM

## 2023-04-11 PROCEDURE — 97530 THERAPEUTIC ACTIVITIES: CPT

## 2023-04-11 NOTE — PROGRESS NOTES
"    [facility]  Occupational Therapy Treatment Note    Date: 4/11/2023  Name: Melani Sloan  Clinic Number: [id number]    Therapy Diagnosis:   Encounter Diagnosis   Name Primary?    Left elbow pain            Physician: Satish Clark MD    Physician Orders: Eval and treat; ROM, ultrasound, massage, paraffin, slow progressive resistance exercises   Medical Diagnosis: M77.11 (ICD-10-CM) - Right lateral epicondylitis  Date of Injury/Onset: December 2022  Evaluation Date: 3/13/2023  Insurance Authorization Period Expiration: 03/09/2024  Plan of Care Expiration: 8 weeks; 5/12/2023  Date of Return to MD: 3/22/2023   Visit # / Visits authorized: 9 / 12  FOTO: (date and score)       Precautions:  Standard hx of seizure        Time In:    10:00 AM   Time Out:  11:00 AM  Total Appointment Time (timed & untimed codes): 60 minutes    SUBJECTIVE     Pt reports: "I woke up this morning and this finger was feeling really stiff (LF) and the back of my shoulder"   She was compliant with home exercise program given last session.   Response to previous treatment: less pain with R    Functional change: less pain     Pain:  5/10  R elbow   Location: right elbow    OBJECTIVE   Objective Measures updated at progress report unless specified.    Observation/Appearance: forward posture      Edema. Measured in centimeters.    3/15/2023 3/15/2023     Left Right   2in. Above elbow 24.5  24.5    2in. Below elbow 24.8  23.9   Wrist Crease       Figure of 8       MCPs          Bilateral shoulder flexion WNL   Bilateral shoulder abduction ~75% WNL      Elbow and Wrist ROM. Measured in degrees.    3/15/2023 3/15/2023     Left Right   Elbow Ext/Flex   WNL   Supination/Pronation   WNL   Wrist Ext/Flex 49/74 55/70   Wrist RD/UD 30/30 25/45      Hand ROM. Measured in degrees.    3/15/2023 3/15/2023     Left Right           Index: MP    Able to make full composite fist    Thumb: MP                    IP           Rad " ADD/ABD           Pal ADD/ABD              Opposition   DPC        Strength (Dynamometer) and Pinch Strength (Pinch Gauge)  Measured in pounds. B    3/15/2023 3/15/2023 4/11/2023 4/11/2023     Left Right Left Right   Rung II; flexed     Extended 24        30 33         33 30      40 50 (+17)      42.5 (+9)   Scott Pinch 11 11.5     3pt Pinch 11 9 16 16 (+7)   2pt Pinch            Sensation    3/13/2023 3/13/2023     Left Right   [place]       Normal 1.65-2.83       Diminished Light Touch 3.22-3.61       Diminished Protective 3.84-4.31       Loss of Protective 4.56-6.65       Untestable >6.65       2 Point Discrimination       Static       Dynamic          Manual Muscle Test    3/15/2023 3/15/2023 4/11/2023 4/11/2023      Left Right Left Right   Wrist Extension  3+ 3 4 4   Wrist Flexion         Radial Deviation         Ulnar Deviation         Supination         Pronation         Elbow Extension         Elbow Flexion            Special Tests  Thumb CMC Grind Test     Finkelstein's Test     Phalen's Test     Scratch Collapse Test     Tennis Elbow Test pos   Resisted Middle Finger Extension Test pos   Mills Test           Limitation/Restriction for FOTO initial eval;  Survey     Therapist reviewed FOTO scores for Melani Sloan on 3/13/2023.   FOTO documents entered into [employer] - see Media section.     Limitation Score: 61%            Treatment     Melani received the treatments listed below:     Supervised modalities after being cleared for contradictions: Hot Pack - 10 minutes       Therapeutic activities to improve functional performance for 55 minutes, including:      Direct contact modalities: NT   Patient received ultrasound to Lateral R elbow area to increase blood flow, circulation, tissue elasticity, and for pain management for 8 minutes @ 1 Mhz, Intensity 1.2 w/cm2 at 100% duty cycle.         Therapeutic activities : 60 min  - prone rows, extension, horizontal abduction (thumb up), scaption x 10  reps with L   - astym stretch holding for 30s x 3 reps (flex/ext only)  - eccentric wrist extension with 1# DB x 10 reps (2 sets)   - back against wall, B scaption x 10 reps (2 sets)   - jessica twist red flexbar x 10 reps (2 sets)  - radial nerve glides passive x 10 reps   - scap AROM: elevation, retraction, rolls x 15 ea         Not performed today:   - serratus anterior rolls on wall with foam roller x 10 reps (2 sets)   Patient Education and Home Exercises      Education provided:   - updated HEP with [employer]   - Progress towards goals     Written Home Exercises Provided: yes.  Exercises were reviewed and Melani was able to demonstrate them prior to the end of the session.  Melani demonstrated good  understanding of the HEP provided. See EMR under Patient Instructions for exercises provided during therapy sessions.      ASSESSMENT     Required mod verbal and tactile cue for proper scapular mechanics. Would benefit from functional restoration program.  Educated on HEP and pt verbalized understanding.     Will continue as tolerated.    Melani is progressing well towards her goals and there are no updates to goals at this time. Pt prognosis is Fair.     Pt will continue to benefit from skilled outpatient occupational therapy to address the deficits listed in the problem list on initial evaluation, provide pt/family education and to maximize pt's level of independence in the home and community environment.     Pt's spiritual, cultural and educational needs considered and pt agreeable to plan of care and goals.    Anticipated barriers to occupational therapy: n/a     Goals:  Long Term Goals (LTGs); to be met by discharge.  Pt will demo improved  strength by 8 pounds   Pt report FOTO score of 41% limitation or below  Pt will report pain level no greater than 1-2/10 during functional daily work and community activities   Pt will demo improved pinch strength by 3 pounds      Short Term Goals (STGs); to be met  within 4 weeks ().  Pt will demo improved  strength by 5 pounds   Pt will report improvement in FOTO score by decreasing limitation by 5-8% points.  Pt will report pain level no greater than 2-3/10 during light activity   Pt will demo improved pinch strength by 2 pounds     PLAN     Continue skilled occupational therapy with individualized plan of care focusing on improving functional independence with use of RUE    Updates/Grading for next session: cont as ward     Denisha Humphrey, OT

## 2023-04-13 ENCOUNTER — CLINICAL SUPPORT (OUTPATIENT)
Dept: REHABILITATION | Facility: HOSPITAL | Age: 33
End: 2023-04-13
Payer: MEDICAID

## 2023-04-13 DIAGNOSIS — R52 PAIN: Primary | ICD-10-CM

## 2023-04-13 DIAGNOSIS — R29.898 DECREASED GRIP STRENGTH: ICD-10-CM

## 2023-04-13 PROCEDURE — 97530 THERAPEUTIC ACTIVITIES: CPT

## 2023-04-13 NOTE — PROGRESS NOTES
[facility]  Occupational Therapy Treatment Note    Date: 4/13/2023  Name: Melani Sloan  Clinic Number: [id number]    Therapy Diagnosis:   Encounter Diagnoses   Name Primary?    Pain Yes    Decreased  strength              Physician: Satish Clark MD    Physician Orders: Eval and treat; ROM, ultrasound, massage, paraffin, slow progressive resistance exercises   Medical Diagnosis: M77.11 (ICD-10-CM) - Right lateral epicondylitis  Date of Injury/Onset: December 2022  Evaluation Date: 3/13/2023  Insurance Authorization Period Expiration: 03/09/2024  Plan of Care Expiration: 8 weeks; 5/12/2023  Date of Return to MD: 3/22/2023   Visit # / Visits authorized: 7 / 20  FOTO: (date and score)       Precautions:  Standard hx of seizure          Time In: 02:00    PM   Time Out:   03:00         PM  Total Appointment Time (timed & untimed codes): 60 minutes    SUBJECTIVE     Pt reports: this finger was feeling really stiff (RF) on R hand   She was compliant with home exercise program given last session.   Response to previous treatment: less pain with R    Functional change: less pain     Pain:  5/10  R  elbow  5/10 L elbow   Location: right elbow    OBJECTIVE   Objective Measures updated at progress report unless specified.    Observation/Appearance: forward posture      Edema. Measured in centimeters.    3/15/2023 3/15/2023     Left Right   2in. Above elbow 24.5  24.5    2in. Below elbow 24.8  23.9   Wrist Crease       Figure of 8       MCPs          Bilateral shoulder flexion WNL   Bilateral shoulder abduction ~75% WNL      Elbow and Wrist ROM. Measured in degrees.    3/15/2023 3/15/2023     Left Right   Elbow Ext/Flex   WNL   Supination/Pronation   WNL   Wrist Ext/Flex 49/74 55/70   Wrist RD/UD 30/30 25/45      Hand ROM. Measured in degrees.    3/15/2023 3/15/2023     Left Right           Index: MP    Able to make full composite fist    Thumb: MP                    IP           Rad  ADD/ABD           Pal ADD/ABD              Opposition   DPC          Strength (Dynamometer) and Pinch Strength (Pinch Gauge)  Measured in pounds. B    3/15/2023 3/15/2023 4/11/2023 4/11/2023     Left Right Left Right   Rung II; flexed     Extended 24        30 33         33 30      40 50      42.5    Scott Pinch 11 11.5     3pt Pinch 11 9 16 16   2pt Pinch            Sensation    3/13/2023 3/13/2023     Left Right   [place]       Normal 1.65-2.83       Diminished Light Touch 3.22-3.61       Diminished Protective 3.84-4.31       Loss of Protective 4.56-6.65       Untestable >6.65       2 Point Discrimination       Static       Dynamic          Manual Muscle Test    3/15/2023 3/15/2023 4/11/2023 4/11/2023      Left Right Left Right   Wrist Extension  3+ 3 4 4   Wrist Flexion         Radial Deviation         Ulnar Deviation         Supination         Pronation         Elbow Extension         Elbow Flexion            Special Tests  Thumb CMC Grind Test     Finkelstein's Test     Phalen's Test     Scratch Collapse Test     Tennis Elbow Test pos   Resisted Middle Finger Extension Test pos   Mills Test           Limitation/Restriction for FOTO initial eval;  Survey     Therapist reviewed FOTO scores for Melani Sloan on 3/13/2023.   FOTO documents entered into [employer] - see Media section.     Limitation Score: 61%            Treatment     Melani received the treatments listed below:     Supervised modalities after being cleared for contradictions: Hot Pack - 10 minutes       Therapeutic activities : 60 min  - [facility] laal tool extensors   - in hand manipulation with coins (2 sets)   - prone rows, extension, horizontal abduction, scaption x 10 reps   - astym stretch holding for 30s x 3 reps (flex/ext only)  - frowns/smiles with red flexbar x 10 reps (2 sets)   - eccentric wrist extension with 1# DB x 10 reps (2 sets) NT   - back against wall, B scaption x 10 reps (2 sets)   - jessica twist red flexbar x 10 reps (2  sets)  - radial nerve glides passive x 10 reps   - scap AROM: elevation, retraction, rolls x 15 ea         Not performed today:   - serratus anterior rolls on wall with foam roller x 10 reps (2 sets)   Patient Education and Home Exercises      Education provided:   - updated HEP with thang   - Progress towards goals     Written Home Exercises Provided: yes.  Exercises were reviewed and Melani was able to demonstrate them prior to the end of the session.  Melani demonstrated good  understanding of the HEP provided. See EMR under Patient Instructions for exercises provided during therapy sessions.      ASSESSMENT        Good ward with eccentric resistive exercises today.    Will continue as tolerated.    Melani is progressing well towards her goals and there are no updates to goals at this time. Pt prognosis is Fair.     Pt will continue to benefit from skilled outpatient occupational therapy to address the deficits listed in the problem list on initial evaluation, provide pt/family education and to maximize pt's level of independence in the home and community environment.     Pt's spiritual, cultural and educational needs considered and pt agreeable to plan of care and goals.    Anticipated barriers to occupational therapy: n/a     Goals:  Long Term Goals (LTGs); to be met by discharge.  Pt will demo improved  strength by 8 pounds   Pt report FOTO score of 41% limitation or below  Pt will report pain level no greater than 1-2/10 during functional daily work and community activities   Pt will demo improved pinch strength by 3 pounds      Short Term Goals (STGs); to be met within 4 weeks ().  Pt will demo improved  strength by 5 pounds   Pt will report improvement in FOTO score by decreasing limitation by 5-8% points.  Pt will report pain level no greater than 2-3/10 during light activity   Pt will demo improved pinch strength by 2 pounds     PLAN     Continue skilled occupational therapy with  individualized plan of care focusing on improving functional independence with use of RUE    Updates/Grading for next session: cont as ward     Denisha Humphrey, OT

## 2023-04-18 ENCOUNTER — CLINICAL SUPPORT (OUTPATIENT)
Dept: REHABILITATION | Facility: HOSPITAL | Age: 33
End: 2023-04-18
Payer: MEDICAID

## 2023-04-18 DIAGNOSIS — G40.219 LOCALIZATION-RELATED SYMPTOMATIC EPILEPSY AND EPILEPTIC SYNDROMES WITH COMPLEX PARTIAL SEIZURES, INTRACTABLE, WITHOUT STATUS EPILEPTICUS: Primary | ICD-10-CM

## 2023-04-18 DIAGNOSIS — R52 PAIN: Primary | ICD-10-CM

## 2023-04-18 DIAGNOSIS — R29.898 DECREASED GRIP STRENGTH: ICD-10-CM

## 2023-04-18 PROCEDURE — 97530 THERAPEUTIC ACTIVITIES: CPT

## 2023-04-18 NOTE — PROGRESS NOTES
"    [facility]  Occupational Therapy Treatment Note    Date: 4/18/2023  Name: Melani Sloan  Clinic Number: [id number]    Therapy Diagnosis:   Encounter Diagnoses   Name Primary?    Pain Yes    Decreased  strength        Physician: Satish Clark MD    Physician Orders: Eval and treat; ROM, ultrasound, massage, paraffin, slow progressive resistance exercises   Medical Diagnosis: M77.11 (ICD-10-CM) - Right lateral epicondylitis  Date of Injury/Onset: December 2022  Evaluation Date: 3/13/2023  Insurance Authorization Period Expiration: 03/09/2024  Plan of Care Expiration: 8 weeks; 5/12/2023  Date of Return to MD: 3/22/2023   Visit # / Visits authorized: 11 /12  FOTO: (date and score)       Precautions:  Standard hx of seizure          Time In:      10:00    AM   Time Out:      11:00           AM  Total Appointment Time (timed & untimed codes): 60 minutes    SUBJECTIVE     Pt reports: "my R elbow is getting better. My L is hurting more today"   She was compliant with home exercise program given last session.   Response to previous treatment: less pain with R    Functional change: less pain     Pain:  4/10  R  elbow  5/10 L elbow   Location: right elbow    OBJECTIVE   Objective Measures updated at progress report unless specified.    Observation/Appearance: forward posture      Edema. Measured in centimeters.    3/15/2023 3/15/2023     Left Right   2in. Above elbow 24.5  24.5    2in. Below elbow 24.8  23.9   Wrist Crease       Figure of 8       MCPs          Bilateral shoulder flexion WNL   Bilateral shoulder abduction ~75% WNL      Elbow and Wrist ROM. Measured in degrees.    3/15/2023 3/15/2023     Left Right   Elbow Ext/Flex   WNL   Supination/Pronation   WNL   Wrist Ext/Flex 49/74 55/70   Wrist RD/UD 30/30 25/45      Hand ROM. Measured in degrees.    3/15/2023 3/15/2023     Left Right           Index: MP    Able to make full composite fist    Thumb: MP                    IP          "  Rad ADD/ABD           Pal ADD/ABD              Opposition   DPC          Strength (Dynamometer) and Pinch Strength (Pinch Gauge)  Measured in pounds. B    3/15/2023 3/15/2023 4/11/2023 4/11/2023     Left Right Left Right   Rung II; flexed     Extended 24        30 33         33 30 (+6)      40 (+10) 50 (+17)      42.5  (+9.5)   Scott Pinch 11 11.5     3pt Pinch 11 9 16 (+5) 16 (+7)   2pt Pinch            Sensation    3/13/2023 3/13/2023     Left Right   [place]       Normal 1.65-2.83       Diminished Light Touch 3.22-3.61       Diminished Protective 3.84-4.31       Loss of Protective 4.56-6.65       Untestable >6.65       2 Point Discrimination       Static       Dynamic          Manual Muscle Test    3/15/2023 3/15/2023 4/11/2023 4/11/2023      Left Right Left Right   Wrist Extension  3+ 3 4 4   Wrist Flexion         Radial Deviation         Ulnar Deviation         Supination         Pronation         Elbow Extension         Elbow Flexion            Special Tests  Thumb CMC Grind Test     Finkelstein's Test     Phalen's Test     Scratch Collapse Test     Tennis Elbow Test pos   Resisted Middle Finger Extension Test pos   Mills Test           Limitation/Restriction for FOTO initial eval;  Survey     Therapist reviewed FOTO scores for Melani Sloan on 3/13/2023.   FOTO documents entered into [employer] - see Media section.     Limitation Score: 61%            Treatment     Melani received the treatments listed below:     Supervised modalities after being cleared for contradictions: Hot Pack - 10 minutes         Therapeutic activities: 60 min  - [facility] lala tool extensors   - in hand manipulation with coins (2 sets)   - prone rows, extension, horizontal abduction, scaption x 10 reps   - astym stretch holding for 30s x 3 reps each   - eccentric frowns/smiles with red flexbar x 10 reps (2 sets)   - eccentric wrist extension with 2# DB x 10 reps (2 sets) with R and 1# with L   - back against wall, B scaption x 10  reps (2 sets)   - B jessica twist red flexbar x 10 reps (2 sets)  - B shoulder stability with ball on wall (1 min) each   - scap AROM: elevation, retraction, rolls x 15 ea       Not performed today:   - serratus anterior rolls on wall with foam roller x 10 reps (2 sets)   Patient Education and Home Exercises      Education provided:   - updated HEP with thang   - Progress towards goals     Written Home Exercises Provided: yes.  Exercises were reviewed and Melani was able to demonstrate them prior to the end of the session.  Melani demonstrated good  understanding of the HEP provided. See EMR under Patient Instructions for exercises provided during therapy sessions.      ASSESSMENT       Pt able to ward eccentric resistance exercises today.     Will continue as tolerated.    Melani is progressing well towards her goals and there are no updates to goals at this time. Pt prognosis is Fair.     Pt will continue to benefit from skilled outpatient occupational therapy to address the deficits listed in the problem list on initial evaluation, provide pt/family education and to maximize pt's level of independence in the home and community environment.     Pt's spiritual, cultural and educational needs considered and pt agreeable to plan of care and goals.    Anticipated barriers to occupational therapy: n/a     Goals:  Long Term Goals (LTGs); to be met by discharge.  Pt will demo improved  strength by 8 pounds   Pt report FOTO score of 41% limitation or below  Pt will report pain level no greater than 1-2/10 during functional daily work and community activities   Pt will demo improved pinch strength by 3 pounds      Short Term Goals (STGs); to be met within 4 weeks ().  Pt will demo improved  strength by 5 pounds   Pt will report improvement in FOTO score by decreasing limitation by 5-8% points.  Pt will report pain level no greater than 2-3/10 during light activity   Pt will demo improved pinch strength by 2  ilene     PLAN     Continue skilled occupational therapy with individualized plan of care focusing on improving functional independence with use of RUE    Updates/Grading for next session: cont as ward     Denisha Humphrey, OT

## 2023-04-19 RX ORDER — MUPIROCIN 20 MG/G
1 OINTMENT TOPICAL 2 TIMES DAILY
Status: CANCELLED | OUTPATIENT
Start: 2023-04-19 | End: 2023-04-20

## 2023-04-19 RX ORDER — MUPIROCIN 20 MG/G
OINTMENT TOPICAL
Status: CANCELLED | OUTPATIENT
Start: 2023-04-19

## 2023-04-19 NOTE — H&P
[facility] ([facility])  Neurosurgery  History & Physical    Patient Name: Melani Sloan  MRN: [id number]  Admission Date: (Not on file)  Attending Physician: Davey Montejo MD   Primary Care Provider: Portia Cohen MD    Patient information was obtained from patient.     Subjective:     Chief Complaint/Reason for Admission: VNS Revision    History of Present Illness: 33 year old female status post VNS revision. Overall she is doing quite well. No fever or wound drainage. She also complains of lower back pain worse on the left side.     No medications prior to admission.       Review of patient's allergies indicates:   Allergen Reactions    Benadryl [diphenhydramine hcl]      Drug interactions, patient states no longer allergic to benadryl off med that caused reaction w benadryl       Past Medical History:   Diagnosis Date    Seizures      Past Surgical History:   Procedure Laterality Date    REPLACEMENT OF BATTERY OF VAGUS NERVE STIMULATOR N/A 3/31/2023    Procedure: REPLACEMENT, BATTERY, NEUROSTIMULATOR, VAGAL;  Surgeon: Davey Montejo MD;  Location: [facility] OR [address] [place] [address];  Service: Neurosurgery;  Laterality: N/A;    VAGUS NERVE STIMULATOR INSERTION       Family History    None       Tobacco Use    Smoking status: Every Day     Types: Vaping w/o nicotine    Smokeless tobacco: Not on file   Substance and Sexual Activity    Alcohol use: No    Drug use: Yes     Types: Marijuana    Sexual activity: Not on file     Review of Systems    All pertinent positive and negative as stated in above HPI. A full review of systems was performed and otherwise negative.   Objective:        There is no height or weight on file to calculate BMI.  Vital Signs (Most Recent):    Vital Signs (24h Range):  BP: ()/()   Arterial Line BP: ()/()                               Neurosurgery Physical Exam    On Exam, she is awake and alert. Her skin incision is healing well with Dermabond in place. She is at her neurological baseline.      Significant Labs:  No results for input(s): GLU, NA, K, CL, CO2, BUN, CREATININE, CALCIUM, MG in the last 48 hours.  No results for input(s): WBC, HGB, HCT, PLT in the last 48 hours.  No results for input(s): LABPT, INR, APTT in the last 48 hours.  Microbiology Results (last 7 days)       ** No results found for the last 168 hours. **          All pertinent labs from the last 24 hours have been reviewed.    Significant Diagnostics:  I have reviewed and interpreted all pertinent imaging results/findings within the past 24 hours.    Assessment/Plan:     There are no hospital problems to display for this patient.   Sp placement of VNS device     33 ear old female status post vagus nerve stimulator revision with replacement of pulse generator. When diagnostics was performed today the lead impedance was high. We test the stimulator twice in the OR. Once after we first connected and then once after al the incisions are closed. There was no high lead impedance at th time. We obtained x-rays. I do not see any obvious problem We discussed performing a re-exploration of the wound and possible revision of the VNS. This is very unusual given that we checked it twice during surgery including after the incisions were closed and lead impedance was fine. Risks of revision which may include simply reconnecting the system or possibly revision of the entire system were discussed. All questions were answered. They would like to proceed.     Davey Montejo MD  Neurosurgery  [facility] - Surgery (2nd Fl)

## 2023-04-21 ENCOUNTER — ANESTHESIA (OUTPATIENT)
Dept: SURGERY | Facility: HOSPITAL | Age: 33
End: 2023-04-21
Payer: MEDICAID

## 2023-04-21 ENCOUNTER — ANESTHESIA EVENT (OUTPATIENT)
Dept: SURGERY | Facility: HOSPITAL | Age: 33
End: 2023-04-21
Payer: MEDICAID

## 2023-04-21 ENCOUNTER — HOSPITAL ENCOUNTER (OUTPATIENT)
Facility: HOSPITAL | Age: 33
Discharge: HOME | End: 2023-04-21
Attending: NEUROLOGICAL SURGERY | Admitting: NEUROLOGICAL SURGERY
Payer: MEDICAID

## 2023-04-21 VITALS
TEMPERATURE: 99 F | BODY MASS INDEX: 27.11 KG/M2 | HEART RATE: 77 BPM | RESPIRATION RATE: 16 BRPM | DIASTOLIC BLOOD PRESSURE: 66 MMHG | OXYGEN SATURATION: 95 % | WEIGHT: 162.94 LBS | SYSTOLIC BLOOD PRESSURE: 111 MMHG

## 2023-04-21 DIAGNOSIS — G40.219 LOCALIZATION-RELATED EPILEPSY WITH COMPLEX PARTIAL SEIZURES WITH INTRACTABLE EPILEPSY: ICD-10-CM

## 2023-04-21 LAB
APTT PPP: 27.5 SEC (ref 21–32)
B-HCG UR QL: NEGATIVE
BACTERIA #/AREA URNS AUTO: ABNORMAL /HPF
BILIRUB UR QL STRIP: NEGATIVE
CLARITY UR REFRACT.AUTO: ABNORMAL
COLOR UR AUTO: YELLOW
CTP QC/QA: YES
GLUCOSE UR QL STRIP: NEGATIVE
HGB UR QL STRIP: NEGATIVE
INR PPP: 1 (ref 0.8–1.2)
KETONES UR QL STRIP: NEGATIVE
LEUKOCYTE ESTERASE UR QL STRIP: ABNORMAL
MICROSCOPIC COMMENT: ABNORMAL
NITRITE UR QL STRIP: NEGATIVE
PH UR STRIP: 7 [PH] (ref 5–8)
PROT UR QL STRIP: NEGATIVE
PROTHROMBIN TIME: 10.3 SEC (ref 9–12.5)
RBC #/AREA URNS AUTO: 4 /HPF (ref 0–4)
SP GR UR STRIP: 1.02 (ref 1–1.03)
SQUAMOUS #/AREA URNS AUTO: 15 /HPF
URN SPEC COLLECT METH UR: ABNORMAL
WBC #/AREA URNS AUTO: 24 /HPF (ref 0–5)

## 2023-04-21 PROCEDURE — 63600175 PHARM REV CODE 636 W HCPCS: Performed by: STUDENT IN AN ORGANIZED HEALTH CARE EDUCATION/TRAINING PROGRAM

## 2023-04-21 PROCEDURE — 81001 URINALYSIS AUTO W/SCOPE: CPT | Performed by: NEUROLOGICAL SURGERY

## 2023-04-21 PROCEDURE — 63600175 PHARM REV CODE 636 W HCPCS: Performed by: ANESTHESIOLOGY

## 2023-04-21 PROCEDURE — D9220A PRA ANESTHESIA: Mod: CRNA,,, | Performed by: NURSE ANESTHETIST, CERTIFIED REGISTERED

## 2023-04-21 PROCEDURE — 71000044 HC DOSC ROUTINE RECOVERY FIRST HOUR: Performed by: NEUROLOGICAL SURGERY

## 2023-04-21 PROCEDURE — 27201423 OPTIME MED/SURG SUP & DEVICES STERILE SUPPLY: Performed by: NEUROLOGICAL SURGERY

## 2023-04-21 PROCEDURE — 71000015 HC POSTOP RECOV 1ST HR: Performed by: NEUROLOGICAL SURGERY

## 2023-04-21 PROCEDURE — 87086 URINE CULTURE/COLONY COUNT: CPT | Performed by: NEUROLOGICAL SURGERY

## 2023-04-21 PROCEDURE — D9220A PRA ANESTHESIA: Mod: ANES,,, | Performed by: ANESTHESIOLOGY

## 2023-04-21 PROCEDURE — 81025 URINE PREGNANCY TEST: CPT | Performed by: STUDENT IN AN ORGANIZED HEALTH CARE EDUCATION/TRAINING PROGRAM

## 2023-04-21 PROCEDURE — 25000003 PHARM REV CODE 250: Performed by: STUDENT IN AN ORGANIZED HEALTH CARE EDUCATION/TRAINING PROGRAM

## 2023-04-21 PROCEDURE — 37000008 HC ANESTHESIA 1ST 15 MINUTES: Performed by: NEUROLOGICAL SURGERY

## 2023-04-21 PROCEDURE — 63600175 PHARM REV CODE 636 W HCPCS: Performed by: NURSE ANESTHETIST, CERTIFIED REGISTERED

## 2023-04-21 PROCEDURE — 36000707: Performed by: NEUROLOGICAL SURGERY

## 2023-04-21 PROCEDURE — 63600175 PHARM REV CODE 636 W HCPCS: Performed by: NEUROLOGICAL SURGERY

## 2023-04-21 PROCEDURE — D9220A PRA ANESTHESIA: ICD-10-PCS | Mod: CRNA,,, | Performed by: NURSE ANESTHETIST, CERTIFIED REGISTERED

## 2023-04-21 PROCEDURE — 37000009 HC ANESTHESIA EA ADD 15 MINS: Performed by: NEUROLOGICAL SURGERY

## 2023-04-21 PROCEDURE — 63600175 PHARM REV CODE 636 W HCPCS

## 2023-04-21 PROCEDURE — 85730 THROMBOPLASTIN TIME PARTIAL: CPT | Performed by: NEUROLOGICAL SURGERY

## 2023-04-21 PROCEDURE — D9220A PRA ANESTHESIA: ICD-10-PCS | Mod: ANES,,, | Performed by: ANESTHESIOLOGY

## 2023-04-21 PROCEDURE — 25000003 PHARM REV CODE 250: Performed by: NEUROLOGICAL SURGERY

## 2023-04-21 PROCEDURE — 36000706: Performed by: NEUROLOGICAL SURGERY

## 2023-04-21 PROCEDURE — 00300 ANES ALL PX INTEG H/N/PTRUNK: CPT | Performed by: NEUROLOGICAL SURGERY

## 2023-04-21 PROCEDURE — 25000003 PHARM REV CODE 250: Performed by: NURSE ANESTHETIST, CERTIFIED REGISTERED

## 2023-04-21 PROCEDURE — 85610 PROTHROMBIN TIME: CPT | Performed by: NEUROLOGICAL SURGERY

## 2023-04-21 RX ORDER — VANCOMYCIN HYDROCHLORIDE 1 G/20ML
INJECTION, POWDER, LYOPHILIZED, FOR SOLUTION INTRAVENOUS
Status: DISCONTINUED | OUTPATIENT
Start: 2023-04-21 | End: 2023-04-21 | Stop reason: HOSPADM

## 2023-04-21 RX ORDER — CEPHALEXIN 500 MG/1
500 CAPSULE ORAL EVERY 6 HOURS
Qty: 20 CAPSULE | Refills: 0 | Status: SHIPPED | OUTPATIENT
Start: 2023-04-21 | End: 2023-04-26

## 2023-04-21 RX ORDER — FENTANYL CITRATE 50 UG/ML
25 INJECTION, SOLUTION INTRAMUSCULAR; INTRAVENOUS EVERY 5 MIN PRN
Status: COMPLETED | OUTPATIENT
Start: 2023-04-21 | End: 2023-04-21

## 2023-04-21 RX ORDER — DEXAMETHASONE SODIUM PHOSPHATE 4 MG/ML
INJECTION, SOLUTION INTRA-ARTICULAR; INTRALESIONAL; INTRAMUSCULAR; INTRAVENOUS; SOFT TISSUE
Status: DISCONTINUED | OUTPATIENT
Start: 2023-04-21 | End: 2023-04-21

## 2023-04-21 RX ORDER — HYDROMORPHONE HYDROCHLORIDE 1 MG/ML
INJECTION, SOLUTION INTRAMUSCULAR; INTRAVENOUS; SUBCUTANEOUS
Status: DISCONTINUED | OUTPATIENT
Start: 2023-04-21 | End: 2023-04-21

## 2023-04-21 RX ORDER — DEXMEDETOMIDINE HYDROCHLORIDE 100 UG/ML
INJECTION, SOLUTION INTRAVENOUS
Status: DISCONTINUED | OUTPATIENT
Start: 2023-04-21 | End: 2023-04-21

## 2023-04-21 RX ORDER — FENTANYL CITRATE 50 UG/ML
INJECTION, SOLUTION INTRAMUSCULAR; INTRAVENOUS
Status: COMPLETED
Start: 2023-04-21 | End: 2023-04-21

## 2023-04-21 RX ORDER — PROPOFOL 10 MG/ML
VIAL (ML) INTRAVENOUS
Status: DISCONTINUED | OUTPATIENT
Start: 2023-04-21 | End: 2023-04-21

## 2023-04-21 RX ORDER — ONDANSETRON 2 MG/ML
INJECTION INTRAMUSCULAR; INTRAVENOUS
Status: DISCONTINUED | OUTPATIENT
Start: 2023-04-21 | End: 2023-04-21

## 2023-04-21 RX ORDER — ROCURONIUM BROMIDE 10 MG/ML
INJECTION, SOLUTION INTRAVENOUS
Status: DISCONTINUED | OUTPATIENT
Start: 2023-04-21 | End: 2023-04-21

## 2023-04-21 RX ORDER — ACETAMINOPHEN 10 MG/ML
INJECTION, SOLUTION INTRAVENOUS
Status: DISCONTINUED | OUTPATIENT
Start: 2023-04-21 | End: 2023-04-21

## 2023-04-21 RX ORDER — SODIUM CHLORIDE 9 MG/ML
INJECTION, SOLUTION INTRAVENOUS CONTINUOUS
Status: DISCONTINUED | OUTPATIENT
Start: 2023-04-21 | End: 2023-04-21 | Stop reason: HOSPADM

## 2023-04-21 RX ORDER — MUPIROCIN 20 MG/G
1 OINTMENT TOPICAL 2 TIMES DAILY
Status: DISCONTINUED | OUTPATIENT
Start: 2023-04-21 | End: 2023-04-21 | Stop reason: HOSPADM

## 2023-04-21 RX ORDER — LIDOCAINE HYDROCHLORIDE AND EPINEPHRINE 10; 10 MG/ML; UG/ML
INJECTION, SOLUTION INFILTRATION; PERINEURAL
Status: DISCONTINUED | OUTPATIENT
Start: 2023-04-21 | End: 2023-04-21 | Stop reason: HOSPADM

## 2023-04-21 RX ORDER — MIDAZOLAM HYDROCHLORIDE 1 MG/ML
INJECTION, SOLUTION INTRAMUSCULAR; INTRAVENOUS
Status: DISCONTINUED | OUTPATIENT
Start: 2023-04-21 | End: 2023-04-21

## 2023-04-21 RX ORDER — MUPIROCIN 20 MG/G
OINTMENT TOPICAL
Status: DISCONTINUED | OUTPATIENT
Start: 2023-04-21 | End: 2023-04-21 | Stop reason: HOSPADM

## 2023-04-21 RX ORDER — LIDOCAINE HYDROCHLORIDE 20 MG/ML
INJECTION INTRAVENOUS
Status: DISCONTINUED | OUTPATIENT
Start: 2023-04-21 | End: 2023-04-21

## 2023-04-21 RX ORDER — FENTANYL CITRATE 50 UG/ML
INJECTION, SOLUTION INTRAMUSCULAR; INTRAVENOUS
Status: DISCONTINUED | OUTPATIENT
Start: 2023-04-21 | End: 2023-04-21

## 2023-04-21 RX ORDER — OXYCODONE HYDROCHLORIDE 5 MG/1
5 TABLET ORAL EVERY 4 HOURS PRN
Qty: 35 TABLET | Refills: 0 | Status: SHIPPED | OUTPATIENT
Start: 2023-04-21

## 2023-04-21 RX ORDER — HALOPERIDOL 5 MG/ML
0.5 INJECTION INTRAMUSCULAR EVERY 10 MIN PRN
Status: DISCONTINUED | OUTPATIENT
Start: 2023-04-21 | End: 2023-04-21 | Stop reason: HOSPADM

## 2023-04-21 RX ADMIN — FENTANYL CITRATE 25 MCG: 50 INJECTION, SOLUTION INTRAMUSCULAR; INTRAVENOUS at 11:04

## 2023-04-21 RX ADMIN — PROPOFOL 40 MG: 10 INJECTION, EMULSION INTRAVENOUS at 10:04

## 2023-04-21 RX ADMIN — SODIUM CHLORIDE: 9 INJECTION, SOLUTION INTRAVENOUS at 08:04

## 2023-04-21 RX ADMIN — SUGAMMADEX 400 MG: 100 INJECTION, SOLUTION INTRAVENOUS at 10:04

## 2023-04-21 RX ADMIN — FENTANYL CITRATE 100 MCG: 50 INJECTION, SOLUTION INTRAMUSCULAR; INTRAVENOUS at 09:04

## 2023-04-21 RX ADMIN — DEXAMETHASONE SODIUM PHOSPHATE 8 MG: 4 INJECTION, SOLUTION INTRAMUSCULAR; INTRAVENOUS at 10:04

## 2023-04-21 RX ADMIN — LIDOCAINE HYDROCHLORIDE 75 MG: 20 INJECTION INTRAVENOUS at 09:04

## 2023-04-21 RX ADMIN — CEFAZOLIN 2 G: 2 INJECTION, POWDER, FOR SOLUTION INTRAMUSCULAR; INTRAVENOUS at 10:04

## 2023-04-21 RX ADMIN — ONDANSETRON 4 MG: 2 INJECTION INTRAMUSCULAR; INTRAVENOUS at 10:04

## 2023-04-21 RX ADMIN — MIDAZOLAM HYDROCHLORIDE 2 MG: 1 INJECTION, SOLUTION INTRAMUSCULAR; INTRAVENOUS at 09:04

## 2023-04-21 RX ADMIN — PROPOFOL 180 MG: 10 INJECTION, EMULSION INTRAVENOUS at 09:04

## 2023-04-21 RX ADMIN — MUPIROCIN: 20 OINTMENT TOPICAL at 08:04

## 2023-04-21 RX ADMIN — DEXMEDETOMIDINE HYDROCHLORIDE 4 MCG: 100 INJECTION, SOLUTION INTRAVENOUS at 10:04

## 2023-04-21 RX ADMIN — HYDROMORPHONE HYDROCHLORIDE 1 MG: 1 INJECTION, SOLUTION INTRAMUSCULAR; INTRAVENOUS; SUBCUTANEOUS at 10:04

## 2023-04-21 RX ADMIN — ACETAMINOPHEN 1000 MG: 10 INJECTION, SOLUTION INTRAVENOUS at 10:04

## 2023-04-21 RX ADMIN — ROCURONIUM BROMIDE 50 MG: 10 INJECTION INTRAVENOUS at 09:04

## 2023-04-21 RX ADMIN — SODIUM CHLORIDE, SODIUM GLUCONATE, SODIUM ACETATE, POTASSIUM CHLORIDE, MAGNESIUM CHLORIDE, SODIUM PHOSPHATE, DIBASIC, AND POTASSIUM PHOSPHATE: .53; .5; .37; .037; .03; .012; .00082 INJECTION, SOLUTION INTRAVENOUS at 10:04

## 2023-04-21 NOTE — DISCHARGE SUMMARY
Carlos Ernandez - Surgery ([facility])  Neurosurgery  Discharge Summary      Patient Name: Melani Sloan  MRN: [id number]  Admission Date: 4/21/2023  Hospital Length of Stay: 0 days  Discharge Date and Time:  04/21/2023 2:02 PM  Attending Physician: Davey Montejo MD   Discharging Provider: Saad Quintana MD  Primary Care Provider: Portia Cohen MD      Procedure(s) (LRB):  INSERTION, NEUROSTIMULATOR, VAGAL (N/A)     Hospital Course:   She presented VNS revision. She underwent procedure without complication and was transferred to recovery where she recovered and deemed stable for discharge.  She will follow up in clinic in 2 weeks.             Pending Diagnostic Studies:       None          There are no hospital problems to display for this patient.     Discharged Condition: stable     Disposition: Home or Self Care    Follow Up:   Follow-up Information       Davey Montejo MD Follow up in 2 week(s).    Specialty: Neurosurgery  Contact information:  [address] [place] [address]  5th Floor  [facility] [address]  [phone]                           Patient Instructions:      Diet Adult Regular     Notify your health care provider if you experience any of the following:  temperature >100.4     Notify your health care provider if you experience any of the following:  redness, tenderness, or signs of infection (pain, swelling, redness, odor or green/yellow discharge around incision site)     Notify your health care provider if you experience any of the following:  severe persistent headache     Notify your health care provider if you experience any of the following:  severe uncontrolled pain     No dressing needed     Activity as tolerated     Medications:  Reconciled Home Medications:      Medication List        START taking these medications      cephALEXin 500 MG capsule  Commonly known as: KEFLEX  Take 1 capsule (500 mg total) by mouth every 6 (six) hours. for 5 days     oxyCODONE 5 MG immediate  release tablet  Commonly known as: ROXICODONE  Take 1 tablet (5 mg total) by mouth every 4 (four) hours as needed for Pain.            CONTINUE taking these medications      * brivaracetam 50 mg Tab  Commonly known as: BRIVIACT  Take 50 mg by mouth every morning.     * brivaracetam 50 mg Tab  Commonly known as: BRIVIACT  Take 50 mg by mouth nightly.     celecoxib 200 MG capsule  Commonly known as: CeleBREX  Take 200 mg by mouth once daily.     doxycycline 100 MG capsule  Commonly known as: MONODOX  Take 100 mg by mouth once daily.     HYDROcodone-acetaminophen  mg per tablet  Commonly known as: NORCO  Take 1 tablet by mouth every 6 (six) hours as needed for Pain (severe pain (7 or more out of 10).).     methocarbamoL 750 MG Tab  Commonly known as: ROBAXIN  Take 500 mg by mouth daily as needed.           * This list has 2 medication(s) that are the same as other medications prescribed for you. Read the directions carefully, and ask your doctor or other care provider to review them with you.                STOP taking these medications      naproxen 250 MG tablet  Commonly known as: STUART Quintana MD  Neurosurgery  [facility] - Surgery (2nd Fl)

## 2023-04-21 NOTE — ANESTHESIA PROCEDURE NOTES
Intubation    Date/Time: 4/21/2023 9:56 AM  Performed by: Keegan Curry CRNA  Authorized by: Chente Ford MD     Intubation:     Induction:  Intravenous    Intubated:  Postinduction    Mask Ventilation:  Easy mask    Attempts:  1    Attempted By:  CRNA    Method of Intubation:  Direct    Blade:  Healy 2    Laryngeal View Grade: Grade I - full view of cords      Difficult Airway Encountered?: No      Complications:  None    Airway Device:  Oral endotracheal tube    Airway Device Size:  7.0    Style/Cuff Inflation:  Cuffed (inflated to minimal occlusive pressure)    Inflation Amount (mL):  8    Tube secured:  21    Secured at:  The lips    Placement Verified By:  Capnometry    Complicating Factors:  None    Findings Post-Intubation:  BS equal bilateral and atraumatic/condition of teeth unchanged

## 2023-04-21 NOTE — PLAN OF CARE
Pt resting comfortably in bed, denies pain. Denies N/V. Tolerating a coca cola without difficulty. VSS. DC instructions provided- no questions. Pt is currently off the monitors waiting for bedside delivery.

## 2023-04-21 NOTE — OP NOTE
Date of procedure: 04/21/2023    Pre-operative diagnosis: VNS system malfunction    Post-Op diagnosis: Same    Procedure performed: Revision of VNS with removal and replacement of electrode attachment to generator    Surgeon: Davey Montejo    Assistant: Saad Quintana    Anesthesia: General    EBL: Minimal    Complications: none    Brief History:Patient is a 34 yo female s/p recent VNS revision. She had high lead impedance in the office. It was working fine in the OR and was tested twice during surgery. She presents for elective revision. Informed consent was obtained.    Procedure in detail: Patient was placed under general anesthesia. She was given ancef and had SCD stockings in place. She was prepped and draped in usual sterile fashion. Her axillary incision was opened. The generator was disconnected from the electrode. Both were cleaned with a jl ratec. The electrode was then reconnected to the generator. Diagnostics were performed x3. Everything was satisfactory. Wound was irrigated and closed in layers with dermabond applied.    I was present for this entire procedure.

## 2023-04-21 NOTE — ANESTHESIA PREPROCEDURE EVALUATION
04/21/2023  Melani Sloan is a 33 y.o., female.      Pre-operative evaluation for Procedure(s) (LRB):  REPLACEMENT, BATTERY, NEUROSTIMULATOR, VAGAL (N/A)    @[id number]@@    No diagnosis found.    Review of patient's allergies indicates:   Allergen Reactions    Benadryl [diphenhydramine hcl]      Drug interactions, patient states no longer allergic to benadryl off med that caused reaction w benadryl       (Not in a hospital admission)           Current Outpatient Medications on File Prior to Visit   Medication Sig Dispense Refill    brivaracetam (BRIVIACT) 50 mg Tab Take 50 mg by mouth every morning.      brivaracetam (BRIVIACT) 50 mg Tab Take 50 mg by mouth nightly.      celecoxib (CELEBREX) 200 MG capsule Take 200 mg by mouth once daily.      doxycycline (MONODOX) 100 MG capsule Take 100 mg by mouth once daily.      HYDROcodone-acetaminophen (NORCO)  mg per tablet Take 1 tablet by mouth every 6 (six) hours as needed for Pain (severe pain (7 or more out of 10).). 30 tablet 0    methocarbamoL (ROBAXIN) 750 MG Tab Take 500 mg by mouth daily as needed.      naproxen (NAPROSYN) 250 MG tablet Take 250 mg by mouth every 4 to 6 hours as needed.       No current facility-administered medications on file prior to visit.       Past Medical History:  No date: Seizures    Past Surgical History:   Procedure Laterality Date    REPLACEMENT OF BATTERY OF VAGUS NERVE STIMULATOR N/A 3/31/2023    Procedure: REPLACEMENT, BATTERY, NEUROSTIMULATOR, VAGAL;  Surgeon: Davey Montejo MD;  Location: [facility] OR [address] [place] [address];  Service: Neurosurgery;  Laterality: N/A;    VAGUS NERVE STIMULATOR INSERTION         Social History     Tobacco Use   Smoking Status Every Day    Types: Vaping w/o nicotine   Smokeless Tobacco Not on file       Social History     Substance and Sexual Activity   Alcohol Use No       Physical  Activity: Not on file         No results for input(s): HCT in the last 72 hours.  No results for input(s): PLT in the last 72 hours.  No results for input(s): K in the last 72 hours.  No results for input(s): CREATININE in the last 72 hours.  No results for input(s): GLU in the last 72 hours.  No results for input(s): PT in the last 72 hours.                      Pre-op Assessment          Review of Systems  Anesthesia Hx:  No problems with previous Anesthesia    Hematology/Oncology:  Hematology Normal   Oncology Normal     Cardiovascular:  Cardiovascular Normal     Pulmonary:   Denies COPD. Asthma mild  Denies Shortness of breath. No routine asthma meds, inhaler about 5 times a year.   Renal/:   Denies Chronic Renal Disease.     Hepatic/GI:   Denies Liver Disease.    Neurological:   Denies TIA. Denies CVA. Seizures Epilepsy with partial complex seizures, without status epilepticus  Muscle weakness,    VAGUS NERVE STIMULATOR    Endocrine:   Denies Diabetes.        Physical Exam  General: Well nourished, Cooperative, Alert and Oriented    Airway:  Mallampati: II   Mouth Opening: Normal  TM Distance: Normal  Tongue: Normal  Neck ROM: Normal ROM        Anesthesia Plan  Type of Anesthesia, risks & benefits discussed:    Anesthesia Type: Gen ETT, Gen Supraglottic Airway  Intra-op Monitoring Plan: Standard ASA Monitors  Post Op Pain Control Plan: multimodal analgesia and IV/PO Opioids PRN  Induction:  IV  Informed Consent: Informed consent signed with the Patient and all parties understand the risks and agree with anesthesia plan.  All questions answered.   ASA Score: 2  Day of Surgery Review of History & Physical: H&P Update referred to the surgeon/provider.    Ready For Surgery From Anesthesia Perspective.     .    Present Prior to Hospital Arrival?: No; Placement Date: 12/29/17; Placement Time: 0952; Method of Intubation: Direct laryngoscopy; Inserted by: Anesthesia Resident; Airway Device: Endotracheal Tube; Mask  Ventilation: Easy; Intubated: Postinduction; Blade: Healy #2; Airway Device Size: 7.0; Style: Cuffed; Cuff Inflation: Minimal occlusive pressure; Placement Verified By: Auscultation, Capnometry, ETT Condensation; Grade: Grade I; Complicating Factors: None; Intubation Findings: Positive EtCO2, Bilateral breath sounds, Atraumatic/Condition of teeth unchanged;  Depth of Insertion: 22; Securment: Lips; Complications: None; Breath Sounds: Equal Bilateral; Insertion Attempts: 1; Removal Date: 12/29/17;  Removal Time: 1127

## 2023-04-21 NOTE — TRANSFER OF CARE
Anesthesia Transfer of Care Note    Patient: Melani Sloan    Procedure(s) Performed: Procedure(s) (LRB):  INSERTION, NEUROSTIMULATOR, VAGAL (N/A)    Patient location: [facility]    Anesthesia Type: general    Transport from OR: Transported from OR on 6-10 L/min O2 by face mask with adequate spontaneous ventilation    Post pain: adequate analgesia    Post assessment: no apparent anesthetic complications and tolerated procedure well    Post vital signs: stable    Level of consciousness: awake and alert    Nausea/Vomiting: no nausea/vomiting    Complications: none    Transfer of care protocol was followed      Last vitals:   Visit Vitals  BP (!) 95/54 (BP Location: Right arm, Patient Position: Sitting)   Pulse 90   Temp 37 °C (98.6 °F) (Temporal)   Resp 12   Wt 73.9 kg (162 lb 14.7 oz)   LMP 03/27/2023 (Approximate)   SpO2 100%   Breastfeeding No   BMI 27.11 kg/m²

## 2023-04-21 NOTE — BRIEF OP NOTE
Carlos Ern[facility] - Surgery (2nd Fl)  Brief Operative Note    SUMMARY     Surgery Date: 4/21/2023     Surgeon(s) and Role:     * Davey Montejo MD - Primary     * Saad Quintana MD - Resident - Assisting        Pre-op Diagnosis:  Localization-related symptomatic epilepsy and epileptic syndromes with complex partial seizures, intractable, without status epilepticus [G40.219]    Post-op Diagnosis:  Post-Op Diagnosis Codes:     * Localization-related symptomatic epilepsy and epileptic syndromes with complex partial seizures, intractable, without status epilepticus [G40.219]    Procedure(s) (LRB):  INSERTION, NEUROSTIMULATOR, VAGAL (N/A)    Anesthesia: General/MAC    Operative Findings:   VNS interrogation and revision    Estimated Blood Loss:  Minimal    Specimens:   Specimen (24h ago, onward)      None            [id number]

## 2023-04-22 LAB — BACTERIA UR CULT: NORMAL

## 2023-04-24 NOTE — PROGRESS NOTES
[facility]  Occupational Therapy Treatment Note    Date: 4/25/2023  Name: Melani Sloan  Clinic Number: [id number]    Therapy Diagnosis:   Encounter Diagnoses   Name Primary?    Pain Yes    Decreased  strength              Physician: Satish Clark MD    Physician Orders: Eval and treat; ROM, ultrasound, massage, paraffin, slow progressive resistance exercises   Medical Diagnosis: M77.11 (ICD-10-CM) - Right lateral epicondylitis  Date of Injury/Onset: December 2022  Evaluation Date: 3/13/2023  Insurance Authorization Period Expiration: 03/09/2024  Plan of Care Expiration: 8 weeks; 5/12/2023  Date of Return to MD: 3/22/2023   Visit # / Visits authorized: 11 /12  FOTO: (date and score)       Precautions:  Standard hx of seizure          Time In:   9:55  AM   Time Out:  10:52  AM  Total Appointment Time (timed & untimed codes): 57 minutes    SUBJECTIVE     Pt reports: reported having sx on her L arm so wanting to focused more on R arm today  She was compliant with home exercise program given last session.   Response to previous treatment: less pain with R    Functional change: less pain     Pain:  4/10  R  elbow  5/10 L elbow   Location: right elbow    OBJECTIVE   Objective Measures updated at progress report unless specified.    Observation/Appearance: forward posture      Edema. Measured in centimeters.    3/15/2023 3/15/2023     Left Right   2in. Above elbow 24.5  24.5    2in. Below elbow 24.8  23.9   Wrist Crease       Figure of 8       MCPs          Bilateral shoulder flexion WNL   Bilateral shoulder abduction ~75% WNL      Elbow and Wrist ROM. Measured in degrees.    3/15/2023 3/15/2023     Left Right   Elbow Ext/Flex   WNL   Supination/Pronation   WNL   Wrist Ext/Flex 49/74 55/70   Wrist RD/UD 30/30 25/45      Hand ROM. Measured in degrees.    3/15/2023 3/15/2023     Left Right           Index: MP    Able to make full composite fist    Thumb: MP                    IP            Rad ADD/ABD           Pal ADD/ABD              Opposition   DPC          Strength (Dynamometer) and Pinch Strength (Pinch Gauge)  Measured in pounds. B    3/15/2023 3/15/2023 4/11/2023 4/11/2023     Left Right Left Right   Rung II; flexed     Extended 24        30 33         33 30      40 50      42.5    Scott Pinch 11 11.5     3pt Pinch 11 9 16 16   2pt Pinch            Sensation    3/13/2023 3/13/2023     Left Right   [place] Jose       Normal 1.65-2.83       Diminished Light Touch 3.22-3.61       Diminished Protective 3.84-4.31       Loss of Protective 4.56-6.65       Untestable >6.65       2 Point Discrimination       Static       Dynamic          Manual Muscle Test    3/15/2023 3/15/2023 4/11/2023 4/11/2023      Left Right Left Right   Wrist Extension  3+ 3 4 4   Wrist Flexion         Radial Deviation         Ulnar Deviation         Supination         Pronation         Elbow Extension         Elbow Flexion            Special Tests  Thumb CMC Grind Test     Finkelstein's Test     Phalen's Test     Scratch Collapse Test     Tennis Elbow Test pos   Resisted Middle Finger Extension Test pos   Mills Test           Limitation/Restriction for FOTO initial eval;  Survey     Therapist reviewed FOTO scores for Melani Sloan on 3/13/2023.   FOTO documents entered into [employer] - see Media section.     Limitation Score: 61%            Treatment     Melani received the treatments listed below:     Supervised modalities after being cleared for contradictions: Hot Pack - 8 minutes B elbows        Therapeutic activities: 49 min  - [facility] lala tool extensors   - astym stretch holding for 30s x 3 reps each (R only today)  - eccentric wrist extension and RD with 2# DB x 10 reps (2 sets) with R   - back against wall, B scaption x 10 reps (2 sets)   - B shoulder stability with ball on wall (1 min) each (R only today)  - shoulder flex, abd 2# x20   - shoulder stability circles in abd x10 c/cc  - Red theraband exercises (R only)  and HEP review: ER/IR/Rows/Pds x10 (2 sets ea)    Not performed today:   - scap AROM: elevation, retraction, rolls x 15 ea   - serratus anterior rolls on wall with foam roller x 10 reps (2 sets)   - in hand manipulation with coins (2 sets)   - prone rows, extension, horizontal abduction, scaption x 10 reps   - eccentric frowns/smiles with red flexbar x 10 reps (2 sets)   - B jessica twist red flexbar x 10 reps (2 sets)  Patient Education and Home Exercises      Education provided:   [facility]   - Progress towards goals     Written Home Exercises Provided: yes.  Exercises were reviewed and Melani was able to demonstrate them prior to the end of the session.  Melani demonstrated good  understanding of the HEP provided. See EMR under Patient Instructions for exercises provided during therapy sessions.      ASSESSMENT         Pt tolerated session well today. Session focused on R arm 2* sx on L arm last week, however able to participate in some light exercises. Will continue as tolerated. Therapy will be held while awaiting ins auth, pt notified.     Melani is progressing well towards her goals and there are no updates to goals at this time. Pt prognosis is Fair.     Pt will continue to benefit from skilled outpatient occupational therapy to address the deficits listed in the problem list on initial evaluation, provide pt/family education and to maximize pt's level of independence in the home and community environment.     Pt's spiritual, cultural and educational needs considered and pt agreeable to plan of care and goals.    Anticipated barriers to occupational therapy: n/a     Goals:  Long Term Goals (LTGs); to be met by discharge.  Pt will demo improved  strength by 8 pounds   Pt report FOTO score of 41% limitation or below  Pt will report pain level no greater than 1-2/10 during functional daily work and community activities   Pt will demo improved pinch strength by 3 pounds      Short Term  Goals (STGs); to be met within 4 weeks ().  Pt will demo improved  strength by 5 pounds   Pt will report improvement in FOTO score by decreasing limitation by 5-8% points.  Pt will report pain level no greater than 2-3/10 during light activity   Pt will demo improved pinch strength by 2 pounds     PLAN     Continue skilled occupational therapy with individualized plan of care focusing on improving functional independence with use of RUE    Updates/Grading for next session: cont as CHINMAY Lewis/KIRSTIN

## 2023-04-24 NOTE — ANESTHESIA POSTPROCEDURE EVALUATION
Anesthesia Post Evaluation    Patient: Melani Sloan    Procedure(s) Performed: Procedure(s) (LRB):  INSERTION, NEUROSTIMULATOR, VAGAL (N/A)    Final Anesthesia Type: general      Patient location during evaluation: PACU  Patient participation: Yes- Able to Participate  Level of consciousness: awake and alert  Post-procedure vital signs: reviewed and stable  Pain management: adequate  Airway patency: patent    PONV status at discharge: No PONV  Anesthetic complications: no      Cardiovascular status: blood pressure returned to baseline  Respiratory status: unassisted  Hydration status: euvolemic  Follow-up not needed.          Vitals Value Taken Time   /66 04/21/23 1215   Temp 36.9 °C (98.5 °F) 04/21/23 1215   Pulse 77 04/21/23 1215   Resp 16 04/21/23 1215   SpO2 95 % 04/21/23 1215         No case tracking events are documented in the log.      Pain/Dandre Score: No data recorded

## 2023-04-25 ENCOUNTER — CLINICAL SUPPORT (OUTPATIENT)
Dept: REHABILITATION | Facility: HOSPITAL | Age: 33
End: 2023-04-25
Payer: MEDICAID

## 2023-04-25 DIAGNOSIS — R52 PAIN: Primary | ICD-10-CM

## 2023-04-25 DIAGNOSIS — R29.898 DECREASED GRIP STRENGTH: ICD-10-CM

## 2023-04-25 PROCEDURE — 97530 THERAPEUTIC ACTIVITIES: CPT | Mod: CO

## 2024-03-12 ENCOUNTER — TELEPHONE (OUTPATIENT)
Dept: ORTHOPEDICS | Facility: CLINIC | Age: 34
End: 2024-03-12
Payer: MEDICAID

## 2024-03-12 NOTE — TELEPHONE ENCOUNTER
Spoke with pt. Informed pt that there are no new Medicaid slots open at them moment. Gave pt the number to [facility] & [facility].

## 2024-04-01 ENCOUNTER — CLINICAL SUPPORT (OUTPATIENT)
Dept: REHABILITATION | Facility: HOSPITAL | Age: 34
End: 2024-04-01
Payer: MEDICAID

## 2024-04-01 DIAGNOSIS — Z78.9 DIFFICULTY WITH ACTIVITIES OF DAILY LIVING: ICD-10-CM

## 2024-04-01 DIAGNOSIS — R29.898 RIGHT ARM WEAKNESS: ICD-10-CM

## 2024-04-01 DIAGNOSIS — M79.641 RIGHT HAND PAIN: Primary | ICD-10-CM

## 2024-04-01 PROCEDURE — 97165 OT EVAL LOW COMPLEX 30 MIN: CPT

## 2024-04-01 NOTE — PATIENT INSTRUCTIONS
"[facility] - OCCUPATIONAL THERAPY  HOME EXERCISE PROGRAM     Complete the following massages for 2-3 minutes each, 1-2x/day.                       Complete the following exercises with 10 repetitions each, 3-4x/day.     AROM: DIP Flexion / Extension  Pinch middle knuckle to prevent bending. Bend end knuckle until stretch is felt.   Hold 3 seconds. Relax. Straighten finger as far as possible.    AROM: PIP Flexion / Extension  Pinch bottom knuckle  to prevent bending. Actively bend middle knuckle until stretch is felt.   Hold 3 seconds. Relax. Straighten finger as far as possible.    AROM: Isolated PIP Flexion  Bend only middle joint of your finger, keeping other fingers straight with other hand.    AROM: Isolated MCP Flexion / Extension ("Wave")   Bend only your large, bottom knuckles. Hold 3 seconds. Keep the tips of your fingers straight. Straighten fingers.    AROM: Isolated IPJ Flexion / Extension ("Hook")  Bend only your middle and end knuckles. Hold 3 seconds.   Straighten your fingers.     AROM: MCP and PIP Flexion / Extension ("Straight Fist")  Bend your bottom and middle knuckles, keeping the tips of your fingers straight. Try to touch the pads of your fingers on your palm. Hold 3 seconds. Straighten your fingers.     AROM: Composite Flexion / Extension ("Full Fist")  Bend every joint in your hand into a fist. Hold 3 seconds. Straighten your fingers.           AROM: Composte Extension ("Finger Lifts")  Lift your finger off of the table one at a time. Hold 3 seconds. Relax your finger.    AROM: Abduction / Adduction  With hand flat on table, spread all fingers apart, then bring them together as close as possible.    Copyright © I. All rights reserved.     Therapist: NO Salamanca/KIRSTIN     "

## 2024-04-04 NOTE — PLAN OF CARE
"[facility]  Occupational Therapy Initial Evaluation     Name: Melani Sloan  Clinic Number: [id number]    Therapy Diagnosis:   Encounter Diagnoses   Name Primary?    Right hand pain Yes    Right arm weakness     Difficulty with activities of daily living      Physician: Satish Clark MD    Physician Orders: Eval and Treat  Medical Diagnosis: M65.841 (ICD-10-CM) - Stenosing tenosynovitis of finger of right hand   Surgical Procedure and Date:   Evaluation Date: 4/1/2024  Insurance Authorization Period Expiration: 3/25/2024  Plan of Care Certification Period: 8 weeks; 5/31/2024  Date of Return to MD:   Visit # / Visits authorized: 1 / 1  FOTO: 1/ 3    Precautions:  Standard    Time In: 01:07 PM  Time Out: 02:00 PM  Total Billable Time: 53 minutes    Subjective     Date of Onset: End of January 2024     History of Current Condition/Mechanism of Injury: Melani reports: She was having "stiffness" in her LF R hand since January.  Went see Dr. Satish Clark and received steroid injection end of February. She reports her pain is worse since injection. She has been unable to work for the past month due to her finger pain.     Falls: 0    Involved Side: Right  Dominant Side: Right      Imaging:  N/A    Prior Therapy: no       Pain:  Functional Pain Scale Rating 0-10:   8/10 at worst  Location: LF PIP jt   Description: Tingling, Numb, Sharp, and Shooting  Aggravating Factors: daily use   Easing Factors: ice    Occupation:     Working presently: employed  Duties:     Functional Limitations/Social History:    Previous functional status includes: Independent with all ADLs.     Current Functional Status   Home/Living environment: lives alone          Limitation of Functional Status as follows:   ADLs/IADLs:     - Feeding: difficulty opening jars, 2 weeks without cooking at start of pain     - Bathing: unable to use LF on R hand     - Dressing: I; shock in LF when donning jacket   - Grooming: "     - Home Management: mod I    - Driving: mod I; uses L hand      Leisure: going to the gym       Patient's Goals for Therapy: get her finger to feel better so she can use her R hand     Past Medical History/Physical Systems Review:   Melani Sloan  has a past medical history of Seizures.    Melani Sloan  has a past surgical history that includes Vagus nerve stimulator insertion; Replacement of battery of vagus nerve stimulator (N/A, 3/31/2023); and Vagus nerve stimulator insertion (N/A, 4/21/2023).    Melani has a current medication list which includes the following prescription(s): brivaracetam, brivaracetam, celecoxib, doxycycline, hydrocodone-acetaminophen, methocarbamol, and oxycodone.    Review of patient's allergies indicates:   Allergen Reactions    Benadryl [diphenhydramine hcl]      Drug interactions, patient states no longer allergic to benadryl off med that caused reaction w benadryl        Objective     Observation/Appearance:          Edema. Measured in centimeters.   4/1/2024 4/1/2024    Right  Left    Index:      Long:       P1 4.9 4.9    PIP 4.9 4.9   P2 4.3 4.3    DIP 4.1 3.9     Hand ROM. Measured in degrees.   4/1/2024 4/1/2024    Right  Left         Index: MP                 PIP                    DIP                CASTILLO          Long:  MP 90 100              PIP 92 100              DIP 76 93              CASTILLO 258 293        Ring:   MP                PIP         Elbow and Wrist ROM. Measured in degrees.   4/4/2024 4/4/2024    Left Right   Supination/Pronation     Wrist Ext/Flex WNL/WNL WNL/WNL   Wrist RD/UD            Strength (Dyanmometer) and Pinch Strength (Pinch Gauge)  Measured in pounds and psi. Average of three trials.   4/1/2024 4/1/2024    Right  Left    Rung II 40 38   Scott Pinch 16 13.5   3pt Pinch 14.5 12   2pt Pinch 10.5 11       Intake Outcome Measure for FOTO  initial eval;  Survey    Therapist reviewed FOTO scores for Melani Sloan on 4/1/2024.   FOTO report - see Media  section or FOTO account episode details.    Intake Score: 41%         Treatment     Total Treatment time separate from Evaluation time:15 minutes     Melani received the treatments listed below:          therapeutic exercises to develop ROM for 5 minutes including:  - FTG x 10 reps   - jt blocking x 5 reps       supervised modalities after being cleared for contradictions: Fluido Fluidotherapy: To R hand for 10 min, continuous air, 115 deg, air speed 50 to decrease pain, edema & scar tissue, sensory re- education, and increased tissue extensibility prior to therex          Patient Education and Home Exercises        Education provided:   -role of OT, goals for OT, scheduling/cancellations, insurance limitations with patient.  -Additional Education provided: paraffin bath daily, HEP, putty when pain decreases to improve pain    Written Home Exercises Provided: yes.  Exercises were reviewed and Melani was able to demonstrate them prior to the end of the session.    Melani demonstrated good  understanding of the education provided.     Pt was advised to perform these exercises free of pain, and to stop performing them if pain occurs.    See EMR under Patient Instructions for exercises provided 4/1/2024.    Assessment       Melani Sloan is a 34 y.o. female referred to outpatient occupational therapy and presents with a medical diagnosis of R hand LF  tenosynovitis.    Following medical record review it is determined that pt will benefit from occupational therapy services in order to maximize pain free and/or functional use of right finger. The following goals were discussed with the patient and patient is in agreement with them as to be addressed in the treatment plan. The patient's rehab potential is Good.     Anticipated barriers to occupational therapy:     Plan of care discussed with patient: Yes  Patient's spiritual, cultural and educational needs considered and patient is agreeable to the plan of care and  goals as stated below:     Medical Necessity is demonstrated by the following  Occupational Profile/History  Co-morbidities and personal factors that may impact the plan of care [x] LOW: Brief chart review  [] MODERATE: Expanded chart review   [] HIGH: Extensive chart review    Moderate / High Support Documentation:      Examination  Performance deficits relating to physical, cognitive or psychosocial skills that result in activity limitations and/or participation restrictions  [x] LOW: addressing 1-3 Performance deficits  [] MODERATE: 3-5 Performance deficits  [] HIGH: 5+ Performance deficits (please support below)    Moderate / High Support Documentation:    Physical:  Joint Mobility  Pain    Cognitive:  No Deficits    Psychosocial:    Habits  Routines  Rituals     Treatment Options [x] LOW: Limited options  [] MODERATE: Several options  [] HIGH: Multiple options      Decision Making/ Complexity Score: low       The following goals were discussed with the patient and patient is in agreement with them as to be addressed in the treatment plan.     Goals:   Pt will f/u with referring physician  Pt will be compliant with HEP and pain reduction techniques  Pt will demo improved LF digit flexion by increasing ROM by 10 degrees.       Plan   Certification Period/Plan of care expiration: 4/1/2024 to 5/25/2024.    Outpatient Occupational Therapy 1 times weekly for 1 weeks to include the following interventions: Paraffin, Fluidotherapy, Manual therapy/joint mobilizations, Modalities for pain management, Therapeutic exercises/activities., and Strengthening.    Denisha Humphrey OT        Physician's Signature: _________________________________________ Date: ________________

## 2024-04-23 ENCOUNTER — CLINICAL SUPPORT (OUTPATIENT)
Dept: REHABILITATION | Facility: HOSPITAL | Age: 34
End: 2024-04-23
Payer: MEDICAID

## 2024-04-23 DIAGNOSIS — M25.60 STIFFNESS IN JOINT: Primary | ICD-10-CM

## 2024-04-23 PROCEDURE — 97530 THERAPEUTIC ACTIVITIES: CPT

## 2024-04-23 NOTE — PATIENT INSTRUCTIONS
[facility]  OCCUPATIONAL THERAPY  HOME EXERCISE PROGRAM     Complete the following strengthening program 1x/day.                       _                        Denisha Humphrey OTR/L   Occupational Therapist

## 2024-04-23 NOTE — PROGRESS NOTES
"[facility]  Occupational Therapy Treatment Note     Date: 4/23/2024  Name: Melani Sloan  Clinic Number: [id number]    Therapy Diagnosis: No diagnosis found.  Physician: Satish Clark MD      Physician: Satish Clark MD     Physician Orders: Eval and Treat  Medical Diagnosis: M65.841 (ICD-10-CM) - Stenosing tenosynovitis of finger of right hand   Surgical Procedure and Date:   Evaluation Date: 4/1/2024  Insurance Authorization Period Expiration: 3/25/2024  Plan of Care Certification Period: 8 weeks; 5/31/2024  Date of Return to MD:   Visit # / Visits authorized: 1 / 1  FOTO: 1/ 3     Precautions:  Standard     Time In: 01:07 PM  Time Out: 02:00 PM  Total Billable Time: 53 minutes    Subjective     Patient reports: "still stiff. The putty is too hard."  She was compliant with home exercise program given last session.   Response to previous treatment: 1 tx   Functional change: slight decrease in discomfort     Pain: not formally rated today   Location: right fingers      Objective     Objective Measures updated at progress report unless specified.    Observation/Appearance:             Edema. Measured in centimeters.    4/1/2024 4/1/2024     Right  Left    Index:        Long:         P1 4.9 4.9    PIP 4.9 4.9   P2 4.3 4.3    DIP 4.1 3.9      Hand ROM. Measured in degrees.    4/1/2024 4/1/2024 4/23/2024     Right  Left  Right            Index: MP                    PIP                       DIP                   CASTILLO                 Long:  MP 90 100 95              PIP 92 100 92              DIP 76 93 75              CASTILLO [phone]             Ring:   MP                   PIP              Elbow and Wrist ROM. Measured in degrees.    4/4/2024 4/4/2024     Left Right   Supination/Pronation       Wrist Ext/Flex WNL/WNL WNL/WNL   Wrist RD/UD                 Strength (Dyanmometer) and Pinch Strength (Pinch Gauge)  Measured in pounds and psi. Average of three trials.    4/1/2024 4/1/2024 " 4/23/2024     Right  Left  Right   Rung II 40 38 55/50   Scott Pinch 16 13.5 15.5   3pt Pinch 14.5 12 16   2pt Pinch 10.5 11 12.5         Intake Outcome Measure for FOTO  initial eval;  Survey     Therapist reviewed FOTO scores for Melani Solan on 4/1/2024.   FOTO report - see Media section or FOTO account episode details.     Intake Score: 41%         Treatment     Melani received the treatments listed below:          therapeutic exercises to develop ROM for 15 minutes including:  Updated objective measurements, please see above   FTG  Pt given pink putty for increased toleration with gripping           supervised modalities after being cleared for contradictions: Fluido Fluidotherapy: To R hand for 10 min, continuous air, 115 deg, air speed 50 to decrease pain, edema & scar tissue, sensory re- education, and increased tissue extensibility prior to therex          Patient Education and Home Exercises     Education provided:   -   - Progress towards goals     Written Home Exercises Provided: yes.  Exercises were reviewed and Melani was able to demonstrate them prior to the end of the session.  Melani demonstrated good  understanding of the home exercise program provided. See electronic medical record under Patient Instructions for exercises provided during therapy sessions.       Assessment     Good understanding of HEP to continue to decrease pain and improved toleration with .      Melani is progressing well towards her goals and there are no updates to goals at this time. Pt prognosis is Good.     Patient will continue to benefit from skilled outpatient occupational therapy to address the deficits listed in the problem list on initial evaluation provide patient/family education and to maximize patient's level of independence in the home and community environment.     Patient's spiritual, cultural and educational needs considered and patient agreeable to plan of care and goals.    Anticipated barriers to  occupational therapy:     Goals:  Pt will report compliance with HEP     Plan     Updates/Grading for next session:     Denisha Humphrey OT   4/23/2024

## 2025-07-28 ENCOUNTER — CLINICAL SUPPORT (OUTPATIENT)
Dept: REHABILITATION | Facility: HOSPITAL | Age: 35
End: 2025-07-28

## 2025-07-28 DIAGNOSIS — M25.532 BILATERAL WRIST PAIN: ICD-10-CM

## 2025-07-28 DIAGNOSIS — M79.644 PAIN IN FINGER OF RIGHT HAND: Primary | ICD-10-CM

## 2025-07-28 DIAGNOSIS — M25.531 BILATERAL WRIST PAIN: ICD-10-CM

## 2025-07-28 PROCEDURE — 97165 OT EVAL LOW COMPLEX 30 MIN: CPT

## 2025-07-28 NOTE — PROGRESS NOTES
Outpatient Rehab    Occupational Therapy Evaluation (only)    Patient Name: Melani Sloan  MRN: [id number]  YOB: 1990  Encounter Date: 7/28/2025    Therapy Diagnosis:   Encounter Diagnoses   Name Primary?    Pain in finger of right hand Yes    Bilateral wrist pain      Physician: Enedelia Becerril MD    Physician Orders: Eval and Treat  Medical Diagnosis: Right wrist pain  Hand pain  Surgical Diagnosis: Hx of Bilateral Carpal Tunnel Release   Surgical Date: Not applicable for this Episode  Days Since Last Surgery: Not applicable for this Episode    Visit # / Visits Authorized: 1 / 1  Insurance Authorization Period: 7/28/2025 to 7/28/2026  Date of Evaluation: 7/28/2025  Plan of Care Certification: 7/28/2025 to 8/25/2025     Time In: 1405   Time Out: 1455  Total Time (in minutes): 50   Total Billable Time (in minutes): 50    Intake Outcome Measure for FOTO Survey    Therapist reviewed FOTO scores for Melani Sloan on 7/28/2025.   FOTO report - see Media section or FOTO account episode details.     Intake Score (%): Not applicable for this Episode    Precautions:  Additional Precaution and Protocol Details: No precautions.     Subjective   History of Present Illness  Melani is a 35 y.o. female who reports to occupational therapy with a chief concern of Pain right hand and wrist and left wrist.     The patient reports a medical diagnosis of Dx: Right wrist pain (M25.531 (ICD-10-CM)); Hand pain (M79.643 (ICD-10-CM), Left Wrist Pain).      Patient reports a surgery of Hx of Bilateral Carpal Tunnel Release.                History of Present Condition/Illness: Reports new onset of increase in right hand and left wrist for unknown reason. Denies trauma. 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 Sought attention from Dr. Becerril. Stated that she has been under the care of Dr. Satish Clark. Hx of carpal Bilateral Carpal Tunnel Releases 2018 and  "2019.  Reported that she achieved good outcome from these surgery. Reported that she was having pain and "shocks" going to the tip of the right long finger dorsal aspect. Received injection that she feels worsened her symptoms.     Activities of Daily Living  Social history was obtained from Patient.    General Prior Level of Function Comments: Regained full use.  General Current Level of Function Comments: limited  Patient Roles: Caregiver for pet, Caregiver for adult  Patient Responsibilities: Driving, Financial management, Health management, Home management, Laundry, Meal prep, Personal ADL, Shopping    Previously independent with activities of daily living? Yes     Currently independent with activities of daily living? No     Modified independence with compensatory techniques and requires increased time to complete.      Previously independent with instrumental activities of daily living? Yes     Currently independent with instrumental activities of daily living? No     Modified independence with compensatory techniques and requires increased time to complete with most. Unable to lift heavy objects, e.g., 80 lb dog. Unable to work-out as frequently as normally. Works out with RDL and machines.        Pain          Location: Right long and index finger pain dorsally. Rated pain 7/10. R elizabeth  hurts "stiffness." 5/10, Rated left wrist pain as an 8/10/  Pain Qualities: Tightness  Pain-Relieving Factors: Other (Comment), Rest  Other Pain-Relieving Factors: "Pain cream." Paraffin  Pain-Aggravating Factors: Movement, Lifting  Describes pain in the right fingers and right wrist as tightness. 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       Past Medical History/Physical Systems Review:   Melani Sloan  has a past medical history of Seizures.    Melani Sloan  has a past surgical history that includes Vagus nerve stimulator insertion; Replacement of battery of vagus nerve stimulator (N/A, 3/31/2023); and Vagus nerve stimulator insertion (N/A, 4/21/2023).    Melani has a current medication list which includes the following prescription(s): brivaracetam, brivaracetam, celecoxib, doxycycline, hydrocodone-acetaminophen, methocarbamol, and oxycodone.    Review of patient's allergies indicates:   Allergen Reactions    Benadryl [diphenhydramine hcl]      Drug interactions, patient states no longer allergic to benadryl off med that caused reaction w benadryl        Objective      Upper Extremity Sensation            Reports numbness and tingling in volar aspect of the right index and long fingers and left pinky at times. Indicated that she awakens with her left wrist curled into flexion and has numbness in the small finger. Reports that this occurs even when she wears her wrist splints.         Wrist/Hand Palpation     PIP of the right long finger tender to palpation. Non-tender to palpation volar wrist.        Mildly tender to palpation volar left wrist.         Wrist Range of Motion  Right Wrist   Active (deg) Passive (deg) Pain Comment   Flexion 92         Extension 62         Radial Deviation 32   Yes     Ulnar Deviation 42           Left Wrist   Active (deg) Passive (deg) Pain Comment   Flexion 82   Yes     Extension 63   Yes     Radial Deviation 20   Yes     Ulnar Deviation 40                     Bilateral hand AROM WNL                 Right  Strength  Right Hand Dynamometer Position: 2  Elbow Position Forearm Position Trial 1 (lbs) Trial 2  (lbs) Trial 3  (lbs) Average  (lbs) Pain   Flexed Neutral 51 49 45 48.33 Yes       Left  Strength  Left Hand Dynamometer Position: 2  Elbow Position Forearm Position Trial 1 (lbs) Trial  "2 (lbs) Trial 3 (lbs) Average (lbs) Pain   Flexed Neutral 44 43 46 44.33 Yes       Right Pinch Strength   Trial 1 (lbs) Trial 2 (lbs) Trial 3 (lbs) Average (lbs) Pain   Lateral (Key Pinch) 15           Three Point (Three Jaw Jonathan) 17           Two Point (Tip to Tip)                 Left Pinch Strength   Trial 1 (lbs) Trial 2 (lbs) Trial 3 (lbs) Average (lbs) Pain   Lateral (Key Pinch) 22           Three Point (Three Jaw Jonathan) 16           Two Point (Tip to Tip)                           Time Entry(in minutes):       Assessment & Plan   Assessment  Melani presents with a condition of Low complexity.   Presentation of Symptoms: Evolving  Will Comorbidities Impact Care: No       IADL Limitations: Grocery/shopping, Meal preparation and cleanup, Home establishment and management  Work Limitations: Job performance  Leisure Limitations: Leisure participation  Functional Limitations: Carrying objects, Manipulating objects, Pain with ADLs/IADLs, Range of motion, Proprioception, Getting off the floor                    Patient Goal for Therapy (OT): for her hands/wrists to "feel better."  Prognosis: Fair  Prognosis Details: Hx and duration of conditions.   Assessment Details: Patient has been referred to outpatient occupational/hand therapy and presents with decreased hand function due to pain  Following medical record review it is determined that patient will benefit from a brief course of occupational therapy services in order to maximize pain free and/or functional use of UE through education on an HEP of nerve glides to address nerve symptomology and isometrics for wrist stability. The patient's rehab potential is fair as she has had OT in the past for similar conditions, has had no new specific trauma, and continues to engage in repetitive strain activities. OT will focus on ergonomics and independence in self management of reoccurring, periodic flares of symptomology.       The patient's spiritual, cultural, and " educational needs were considered, and the patient is agreeable to the plan of care and goals.           Goals:   Active       OT Goals       Melani will display increase in left wrist arom = to that of the right.        Start:  07/28/25    Expected End:  08/25/25            Melani will be independent in HEP of wrist stability exercises.        Start:  07/28/25    Expected End:  08/25/25            Melani will be able to recite joint protection principles.        Start:  07/28/25    Expected End:  08/25/25            Alfonso with be independent in tendon gliding exercises for the digits.        Start:  07/28/25    Expected End:  08/25/25            Melani will report decrease in pain by 1 to 2 points out of 10;        Start:  07/28/25    Expected End:  08/25/25                Lubna Randall, OT

## 2025-07-28 NOTE — Clinical Note
July 28, 2025  Enedelia Becerril MD  [address]  [facility]  Elisa [id number]      To whom it may concern,     The attached plan of care is being sent to you for review and reference.    You may indicate your approval by signing the document electronically, or by faxing/mailing a signed copy of the final page of this document back to the attention of Lubna Randall OT:         Plan of Care 7/28/25   Effective from: 7/28/2025  Effective to: 8/25/2025    Plan ID: [id number]            Participants as of Finalize on 7/28/2025    Name Type Comments Contact Info    Enedelia Becerril MD Referring Provider  [phone]       Last Plan Note     Author: Lubna Randall OT Status: Addendum Last edited: 7/28/2025  2:00 PM         Outpatient Rehab    Occupational Therapy Evaluation (only)    Patient Name: Melani Sloan  MRN: [id number]  YOB: 1990  Encounter Date: 7/28/2025    Therapy Diagnosis:   Encounter Diagnoses   Name Primary?    Pain in finger of right hand Yes    Bilateral wrist pain      Physician: Enedelai Becerril MD    Physician Orders: Eval and Treat  Medical Diagnosis: Right wrist pain  Hand pain  Surgical Diagnosis: Hx of Bilateral Carpal Tunnel Release   Surgical Date: Not applicable for this Episode  Days Since Last Surgery: Not applicable for this Episode    Visit # / Visits Authorized: 1 / 1  Insurance Authorization Period: 7/28/2025 to 7/28/2026  Date of Evaluation: 7/28/2025  Plan of Care Certification: 7/28/2025 to 8/25/2025     Time In: 1405   Time Out: 1455  Total Time (in minutes): 50   Total Billable Time (in minutes): 50    Intake Outcome Measure for FOTO Survey    Therapist reviewed FOTO scores for Melani Sloan on 7/28/2025.   FOTO report - see Media section or FOTO account episode details.     Intake Score (%): Not applicable for this Episode    Precautions:  Additional Precaution and Protocol Details: No precautions.     Subjective   History of  "Present Illness  Melani is a 35 y.o. female who reports to occupational therapy with a chief concern of Pain right hand and wrist and left wrist.     The patient reports a medical diagnosis of Dx: Right wrist pain (M25.531 (ICD-10-CM)); Hand pain (M79.643 (ICD-10-CM), Left Wrist Pain).      Patient reports a surgery of Hx of Bilateral Carpal Tunnel Release.                History of Present Condition/Illness: Reports new onset of increase in right hand and left wrist for unknown reason. Denies trauma. 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 Sought attention from Dr. Becerril. Stated that she has been under the care of Dr. Satish Clark. Hx of carpal Bilateral Carpal Tunnel Releases 2018 and 2019.  Reported that she achieved good outcome from these surgery. Reported that she was having pain and "shocks" going to the tip of the right long finger dorsal aspect. Received injection that she feels worsened her symptoms.     Activities of Daily Living  Social history was obtained from Patient.    General Prior Level of Function Comments: Regained full use.  General Current Level of Function Comments: limited  Patient Roles: Caregiver for pet, Caregiver for adult  Patient Responsibilities: Driving, Financial management, Health management, Home management, Laundry, Meal prep, Personal ADL, Shopping    Previously independent with activities of daily living? Yes     Currently independent with activities of daily living? No     Modified independence with compensatory techniques and requires increased time to complete.      Previously independent with instrumental activities of daily living? Yes     Currently independent with instrumental activities of daily living? No     Modified independence with compensatory techniques and requires increased time to complete with most. Unable to lift heavy objects, e.g., 80 lb dog. Unable to work-out as frequently as normally. " "Works out with [employer] and machines.        Pain          Location: Right long and index finger pain dorsally. Rated pain 7/10. R elizabeth  hurts "stiffness." 5/10, Rated left wrist pain as an 8/10/  Pain Qualities: Tightness  Pain-Relieving Factors: Other (Comment), Rest  Other Pain-Relieving Factors: "Pain cream." Paraffin  Pain-Aggravating Factors: Movement, Lifting  Describes pain in the right fingers and right wrist as tightness. 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       Past Medical History/Physical Systems Review:   Melani Sloan  has a past medical history of Seizures.    Melani Sloan  has a past surgical history that includes Vagus nerve stimulator insertion; Replacement of battery of vagus nerve stimulator (N/A, 3/31/2023); and Vagus nerve stimulator insertion (N/A, 4/21/2023).    Melani has a current medication list which includes the following prescription(s): brivaracetam, brivaracetam, celecoxib, doxycycline, hydrocodone-acetaminophen, methocarbamol, and oxycodone.    Review of patient's allergies indicates:   Allergen Reactions    Benadryl [diphenhydramine hcl]      Drug interactions, patient states no longer allergic to benadryl off med that caused reaction w benadryl        Objective      Upper Extremity Sensation            Reports numbness and tingling in volar aspect of the right index and long fingers and left pinky at times. Indicated that she awakens with her left wrist curled into flexion and has numbness in the small finger. Reports that this occurs even when she wears her wrist splints.         Wrist/Hand Palpation     PIP of the right long finger tender to palpation. Non-tender to " "palpation volar wrist.        Mildly tender to palpation volar left wrist.         Wrist Range of Motion  Right Wrist   Active (deg) Passive (deg) Pain Comment   Flexion 92         Extension 62         Radial Deviation 32   Yes     Ulnar Deviation 42           Left Wrist   Active (deg) Passive (deg) Pain Comment   Flexion 82   Yes     Extension 63   Yes     Radial Deviation 20   Yes     Ulnar Deviation 40                     Bilateral hand AROM WNL                 Right  Strength  Right Hand Dynamometer Position: 2  Elbow Position Forearm Position Trial 1 (lbs) Trial 2  (lbs) Trial 3  (lbs) Average  (lbs) Pain   Flexed Neutral 51 49 45 48.33 Yes       Left  Strength  Left Hand Dynamometer Position: 2  Elbow Position Forearm Position Trial 1 (lbs) Trial 2 (lbs) Trial 3 (lbs) Average (lbs) Pain   Flexed Neutral 44 43 46 44.33 Yes       Right Pinch Strength   Trial 1 (lbs) Trial 2 (lbs) Trial 3 (lbs) Average (lbs) Pain   Lateral (Key Pinch) 15           Three Point (Three Jaw Jonathan) 17           Two Point (Tip to Tip)                 Left Pinch Strength   Trial 1 (lbs) Trial 2 (lbs) Trial 3 (lbs) Average (lbs) Pain   Lateral (Key Pinch) 22           Three Point (Three Jaw Jonathan) 16           Two Point (Tip to Tip)                           Time Entry(in minutes):       Assessment & Plan   Assessment  Melani presents with a condition of Low complexity.   Presentation of Symptoms: Evolving  Will Comorbidities Impact Care: No       IADL Limitations: Grocery/shopping, Meal preparation and cleanup, Home establishment and management  Work Limitations: Job performance  Leisure Limitations: Leisure participation  Functional Limitations: Carrying objects, Manipulating objects, Pain with ADLs/IADLs, Range of motion, Proprioception, Getting off the floor                    Patient Goal for Therapy (OT): for her hands/wrists to "feel better."  Prognosis: Fair  Prognosis Details: Hx and duration of conditions.   Assessment " Details: Patient has been referred to outpatient occupational/hand therapy and presents with decreased hand function due to pain  Following medical record review it is determined that patient will benefit from a brief course of occupational therapy services in order to maximize pain free and/or functional use of UE through education on an HEP of nerve glides to address nerve symptomology and isometrics for wrist stability. The patient's rehab potential is fair as she has had OT in the past for similar conditions, has had no new specific trauma, and continues to engage in repetitive strain activities. OT will focus on ergonomics and independence in self management of reoccurring, periodic flares of symptomology.       The patient's spiritual, cultural, and educational needs were considered, and the patient is agreeable to the plan of care and goals.           Goals:   Active       OT Goals       Melani will display increase in left wrist arom = to that of the right.        Start:  07/28/25    Expected End:  08/25/25            Melani will be independent in HEP of wrist stability exercises.        Start:  07/28/25    Expected End:  08/25/25            Melani will be able to recite joint protection principles.        Start:  07/28/25    Expected End:  08/25/25            Alfonso with be independent in tendon gliding exercises for the digits.        Start:  07/28/25    Expected End:  08/25/25            Melani will report decrease in pain by 1 to 2 points out of 10;        Start:  07/28/25    Expected End:  08/25/25                Lubna Randall OT           Current Participants as of 7/28/2025    Name Type Comments Contact Info    Enedelia Becerril MD Referring Provider  [phone]    Signature pending            Sincerely,      Lubna Randall OT  [facility]                                                            Dear Lubna Randall OT,    RE: Ms. Melani Sloan, MRN: [id number]    I certify that I have  reviewed the attached plan of care and agree to the details within.        ___________________________  ___________________________  Provider Printed Name   Provider Signed Name      ___________________________  Date and Time

## 2025-08-07 ENCOUNTER — CLINICAL SUPPORT (OUTPATIENT)
Dept: REHABILITATION | Facility: HOSPITAL | Age: 35
End: 2025-08-07

## 2025-08-07 DIAGNOSIS — M79.641 RIGHT HAND PAIN: Primary | ICD-10-CM

## 2025-08-07 DIAGNOSIS — M25.532 BILATERAL WRIST PAIN: ICD-10-CM

## 2025-08-07 DIAGNOSIS — M25.531 BILATERAL WRIST PAIN: ICD-10-CM

## 2025-08-07 PROCEDURE — 97110 THERAPEUTIC EXERCISES: CPT

## 2025-08-07 PROCEDURE — 97018 PARAFFIN BATH THERAPY: CPT

## 2025-08-07 NOTE — PROGRESS NOTES
Outpatient Rehab    Occupational Therapy Visit    Patient Name: Melani Sloan  MRN: [id number]  YOB: 1990  Encounter Date: 8/7/2025    Therapy Diagnosis:   Encounter Diagnoses   Name Primary?    Right hand pain Yes    Bilateral wrist pain      Physician: Enedelia Becerril MD    Physician Orders: Eval and Treat  Medical Diagnosis: Right wrist pain  Hand pain  Surgical Diagnosis: Hx of Bilateral Carpal Tunnel Release   Surgical Date: Not applicable for this Episode  Days Since Last Surgery: Not applicable for this Episode    Visit # / Visits Authorized: 1 / 20  Insurance Authorization Period: 7/28/2025 to 12/31/2025  Date of Evaluation: 7/28/2025  Plan of Care Certification: 7/28/2025 to 8/25/2025      Time In: 1400   Time Out: 1500  Total Time (in minutes): 60   Total Billable Time (in minutes): 45    FOTO:  Intake Score (%): 63  Survey Score 2 (%): Not applicable for this Episode  Survey Score 3 (%): Not applicable for this Episode    Precautions:  Additional Precaution and Protocol Details: No precautions.     Subjective   Melani was without new complaints..         Objective            Treatment:  Therapeutic Exercise  TE 1: AROM Wrist Flex/Ext, R/UD 10 reps ea  TE 2: AROM:  Hook, MP Flex, Straight Fist, Fist 10 reps each B Hands  TE 3: AROM Thumb: Pinky slide 10 reps each Emre  TE 4: AROM FA: sup/pro 10 reps emre  TE 5: Median/ ulnar nerve glide x 10 reps  TE 6: Educated in HEP of above exercises. Reviewed to insure ability to replicate correctly.  Modalities  Paraffin Bath: Patient received paraffin bath for 10 min to increase blood flow, circulation, pain management and for tissue elasticity prior to therex. bilaterally    Time Entry(in minutes):  Paraffin Bath Time Entry: 10  Therapeutic Exercise Time Entry: 30    Assessment & Plan   Assessment: Melani was able to replicate all exercises of hep by end of session. She tolerate the exercises with some report of pain at times.    Evaluation/Treatment Tolerance: Patient tolerated treatment well    The patient will continue to benefit from skilled outpatient occupational therapy in order to address the deficits listed in the problem list on the initial evaluation, provide patient and family education, and maximize the patients level of independence in the home and community environments.     The patient's spiritual, cultural, and educational needs were considered, and the patient is agreeable to the plan of care and goals.           Plan: As stated in OT Plan of Care following medical record review it is determined that patient will benefit from a brief course of occupational therapy services in order to maximize pain free and/or functional use of UE through education on an HEP of nerve glides to address nerve symptomology and isometrics for wrist stability. The patient's rehab potential is fair as she has had OT in the past for similar conditions, has had no new specific trauma, and continues to engage in repetitive strain activities. OT will focus on ergonomics and independence in self management of reoccurring, periodic flares of symptomology.    Goals:   Active       OT Goals       Melani will display increase in left wrist arom = to that of the right.        Start:  07/28/25    Expected End:  08/25/25            Melani will be independent in HEP of wrist stability exercises.        Start:  07/28/25    Expected End:  08/25/25            Melani will be able to recite joint protection principles.        Start:  07/28/25    Expected End:  08/25/25            Alfonso with be independent in tendon gliding exercises for the digits.        Start:  07/28/25    Expected End:  08/25/25            Melani will report decrease in pain by 1 to 2 points out of 10;        Start:  07/28/25    Expected End:  08/25/25                Lubna Randall, OT

## 2025-08-07 NOTE — PATIENT INSTRUCTIONS
[facility], OCCUPATIONAL THERAPY  HOME EXERCISE PROGRAM                 Complete 10 repetitions of each exercise 4 times a day:                        Median/Ulnar Nerve glides       Begin exercise with arms at sides and bend elbows to a 90* with palms facing up,   shoulders in a relaxed position       Straighten elbows, bend wrist so palms are facing the floor, then shrug shoulders (simultaneously).   Keep a fluid motion, alternating between the 2 positions.   Repeat 3-5 reps  Perform 5-8 times a day          Copyright © I. All rights reserved.     Therapist: NO Carmen/L, CHT

## 2025-08-12 ENCOUNTER — CLINICAL SUPPORT (OUTPATIENT)
Dept: REHABILITATION | Facility: HOSPITAL | Age: 35
End: 2025-08-12

## 2025-08-12 DIAGNOSIS — M25.531 BILATERAL WRIST PAIN: ICD-10-CM

## 2025-08-12 DIAGNOSIS — M25.532 BILATERAL WRIST PAIN: ICD-10-CM

## 2025-08-12 DIAGNOSIS — M79.641 RIGHT HAND PAIN: Primary | ICD-10-CM

## 2025-08-12 PROCEDURE — 97530 THERAPEUTIC ACTIVITIES: CPT

## 2025-08-12 PROCEDURE — 97112 NEUROMUSCULAR REEDUCATION: CPT

## 2025-08-12 PROCEDURE — 97110 THERAPEUTIC EXERCISES: CPT

## 2025-08-12 PROCEDURE — 97763 ORTHC/PROSTC MGMT SBSQ ENC: CPT

## 2025-08-14 ENCOUNTER — CLINICAL SUPPORT (OUTPATIENT)
Dept: REHABILITATION | Facility: HOSPITAL | Age: 35
End: 2025-08-14

## 2025-08-14 DIAGNOSIS — M79.641 RIGHT HAND PAIN: Primary | ICD-10-CM

## 2025-08-14 DIAGNOSIS — M25.532 BILATERAL WRIST PAIN: ICD-10-CM

## 2025-08-14 DIAGNOSIS — M25.531 BILATERAL WRIST PAIN: ICD-10-CM

## 2025-08-14 PROCEDURE — 97018 PARAFFIN BATH THERAPY: CPT

## 2025-08-14 PROCEDURE — 97530 THERAPEUTIC ACTIVITIES: CPT

## 2025-08-14 PROCEDURE — 97110 THERAPEUTIC EXERCISES: CPT

## 2025-08-19 ENCOUNTER — CLINICAL SUPPORT (OUTPATIENT)
Dept: REHABILITATION | Facility: HOSPITAL | Age: 35
End: 2025-08-19

## 2025-08-19 DIAGNOSIS — M25.532 BILATERAL WRIST PAIN: ICD-10-CM

## 2025-08-19 DIAGNOSIS — M79.641 RIGHT HAND PAIN: Primary | ICD-10-CM

## 2025-08-19 DIAGNOSIS — M25.531 BILATERAL WRIST PAIN: ICD-10-CM

## 2025-08-19 PROCEDURE — 97530 THERAPEUTIC ACTIVITIES: CPT | Mod: CO

## 2025-08-19 PROCEDURE — 97110 THERAPEUTIC EXERCISES: CPT | Mod: CO

## 2025-08-19 PROCEDURE — 97018 PARAFFIN BATH THERAPY: CPT | Mod: CO

## 2025-08-20 ENCOUNTER — CLINICAL SUPPORT (OUTPATIENT)
Dept: REHABILITATION | Facility: HOSPITAL | Age: 35
End: 2025-08-20

## 2025-08-20 DIAGNOSIS — M25.532 BILATERAL WRIST PAIN: ICD-10-CM

## 2025-08-20 DIAGNOSIS — M25.531 BILATERAL WRIST PAIN: ICD-10-CM

## 2025-08-20 DIAGNOSIS — M79.641 RIGHT HAND PAIN: Primary | ICD-10-CM

## 2025-08-20 PROCEDURE — 97110 THERAPEUTIC EXERCISES: CPT

## 2025-08-20 PROCEDURE — 97530 THERAPEUTIC ACTIVITIES: CPT

## 2025-08-20 PROCEDURE — 97112 NEUROMUSCULAR REEDUCATION: CPT

## (undated) DEVICE — DURAPREP SURG SCRUB 26ML

## (undated) DEVICE — TRAY FOLEY 16FR INFECTION CONT

## (undated) DEVICE — DRAPE STERI-DRAPE 1000 17X11IN

## (undated) DEVICE — KIT SURGIFLO HEMOSTATIC MATRIX

## (undated) DEVICE — DRESSING TEGADERM 2X2 3/4

## (undated) DEVICE — DRESSING SURGICAL 1/2X1/2

## (undated) DEVICE — ADHESIVE DERMABOND ADVANCED

## (undated) DEVICE — DRAPE THYROID WITH ARMBOARD

## (undated) DEVICE — GAUZE SPONGE 4X4 12PLY

## (undated) DEVICE — DRAPE INCISE IOBAN 2 23X17IN

## (undated) DEVICE — TUBE FRAZIER 5MM 2FT SOFT TIP

## (undated) DEVICE — SEE MEDLINE ITEM 156905

## (undated) DEVICE — COVER PROBE NL STRL 3.6X96IN

## (undated) DEVICE — MARKER SKIN STND TIP BLUE BARR

## (undated) DEVICE — SUT MCRYL PLUS 4-0 PS2 27IN

## (undated) DEVICE — Device

## (undated) DEVICE — ELECTRODE BLADE INSULATED 1 IN

## (undated) DEVICE — ADHESIVE MASTISOL VIAL 48/BX

## (undated) DEVICE — SUT VICRYL PLUS 3-0 SH 18IN

## (undated) DEVICE — CORD BIPOLAR 12 FOOT

## (undated) DEVICE — DRESSING TRANS 4X4 TEGADERM

## (undated) DEVICE — CARTRIDGE OIL

## (undated) DEVICE — REMOVER LOTION

## (undated) DEVICE — TUNNELER

## (undated) DEVICE — DRAPE TOP 53X102IN

## (undated) DEVICE — CLOSURE SKIN STERI STRIP 1/2X4

## (undated) DEVICE — SYS CLSR DERMABOND PRINEO 22CM

## (undated) DEVICE — DIFFUSER

## (undated) DEVICE — STRIP MEDI WND CLSR 1/2X4IN

## (undated) DEVICE — DRESSING TRANS 2X2 TEGADERM

## (undated) DEVICE — DRAPE OPMI STERILE

## (undated) DEVICE — DRAPE STERI INSTRUMENT 1018

## (undated) DEVICE — SEE MEDLINE ITEM 157150

## (undated) DEVICE — STAPLER SKIN PROXIMATE WIDE

## (undated) DEVICE — SEE MEDLINE ITEM 153688

## (undated) DEVICE — SUT 4/0 18IN NUROLON BLK B

## (undated) DEVICE — ELECTRODE REM PLYHSV RETURN 9

## (undated) DEVICE — DRESSING TELFA N ADH 3X8

## (undated) DEVICE — DRAPE T THYROID STERILE

## (undated) DEVICE — GAUZE SPONGE PEANUT STRL

## (undated) DEVICE — KIT SURGIFLO EVITHROM

## (undated) DEVICE — SEE MEDLINE ITEM 157131

## (undated) DEVICE — SUT SILK 2-0 PS 18IN BLACK

## (undated) DEVICE — CLOSURE SKIN STERI STRIP 1/4X3

## (undated) DEVICE — DRESSING TELFA STRL 4X3 LF